# Patient Record
Sex: MALE | Race: WHITE | Employment: OTHER | URBAN - METROPOLITAN AREA
[De-identification: names, ages, dates, MRNs, and addresses within clinical notes are randomized per-mention and may not be internally consistent; named-entity substitution may affect disease eponyms.]

---

## 2017-01-06 ENCOUNTER — TELEPHONE (OUTPATIENT)
Dept: FAMILY MEDICINE | Facility: CLINIC | Age: 72
End: 2017-01-06

## 2017-01-06 DIAGNOSIS — J40 BRONCHITIS: Primary | ICD-10-CM

## 2017-01-06 NOTE — TELEPHONE ENCOUNTER
Cefuroxime      Last Written Prescription Date: Histicorical  Last Fill Quantity: ?,  # refills: ?   Last Office Visit with [facility] [place] or [facility] prescribing provider: 12/19/2016                                         Next 5 appointments (look out 90 days)     Jan 19, 2017  9:00 AM   Return Visit with Lillian Machado MD   [facility] ([facility])    [address] [address]   [phone]            Feb 02, 2017 10:40 AM   Office Visit with Marcelino Foster MD   [facility]    [address] [address]   [phone]

## 2017-01-12 ENCOUNTER — ANTICOAGULATION THERAPY VISIT (OUTPATIENT)
Dept: ANTICOAGULATION | Facility: CLINIC | Age: 72
End: 2017-01-12
Payer: MEDICARE

## 2017-01-12 ENCOUNTER — HOSPITAL ENCOUNTER (OUTPATIENT)
Dept: CARDIOLOGY | Facility: CLINIC | Age: 72
Discharge: HOME | End: 2017-01-12
Attending: INTERNAL MEDICINE | Admitting: INTERNAL MEDICINE
Payer: MEDICARE

## 2017-01-12 DIAGNOSIS — I25.10 CORONARY ATHEROSCLEROSIS: ICD-10-CM

## 2017-01-12 DIAGNOSIS — I48.91 ATRIAL FIBRILLATION (H): ICD-10-CM

## 2017-01-12 DIAGNOSIS — Z79.01 LONG TERM CURRENT USE OF ANTICOAGULANT THERAPY: Primary | ICD-10-CM

## 2017-01-12 DIAGNOSIS — I10 ESSENTIAL HYPERTENSION: ICD-10-CM

## 2017-01-12 LAB — INR POINT OF CARE: 3 (ref 0.86–1.14)

## 2017-01-12 PROCEDURE — 85610 PROTHROMBIN TIME: CPT | Mod: QW

## 2017-01-12 PROCEDURE — 40000264 ECHO COMPLETE WITH OPTISON

## 2017-01-12 PROCEDURE — 36416 COLLJ CAPILLARY BLOOD SPEC: CPT

## 2017-01-12 PROCEDURE — 99207 ZZC NO CHARGE NURSE ONLY: CPT

## 2017-01-12 PROCEDURE — 25500064 ZZH RX 255 OP 636: Performed by: INTERNAL MEDICINE

## 2017-01-12 PROCEDURE — 93306 TTE W/DOPPLER COMPLETE: CPT | Mod: 26 | Performed by: INTERNAL MEDICINE

## 2017-01-12 RX ADMIN — HUMAN ALBUMIN MICROSPHERES AND PERFLUTREN 2 ML: 10; .22 INJECTION, SOLUTION INTRAVENOUS at 10:40

## 2017-01-12 NOTE — PROGRESS NOTES
ANTICOAGULATION FOLLOW-UP CLINIC VISIT    Patient Name:  Stef Bhatt  Date:  1/12/2017  Contact Type:  Face to Face    SUBJECTIVE:     Patient Findings     Positives Diet Changes (had one extra drink this week than he usually does), Activity level change (has been down, except the past three days has been shoveling), No Problem Findings    Comments ECHO today and will see Dr. Machado in one week. Has appt with PCP 2/2.           OBJECTIVE    INR PROTIME   Date Value Ref Range Status   01/12/2017 3.0* 0.86 - 1.14 Final       ASSESSMENT / PLAN  INR assessment THER    Recheck INR In: 8 WEEKS 9 weeks   INR Location Clinic      Anticoagulation Summary as of 1/12/2017     INR goal 2.0-3.0   Selected INR 3.0 (1/12/2017)   Maintenance plan 2 mg (2 mg x 1) on Mon, Fri; 4 mg (2 mg x 2) all other days   Full instructions 2 mg on Mon, Fri; 4 mg all other days   Weekly total 24 mg   No change documented Blanca Nixon RN   Plan last modified Noelle Harrington RN (4/6/2015)   Next INR check 3/16/2017   Priority INR   Target end date Indefinite    Indications   Atrial fibrillation (H) [I48.91]  Long term current use of anticoagulant therapy [Z79.01]         Anticoagulation Episode Summary     INR check location     Preferred lab     Send INR reminders to [facility]    Comments * warfarin 2 mg tabs. Dr Shelley Lennox is pulmonologist.       Anticoagulation Care Providers     Provider Role Specialty Phone number    Marcelino Foster MD [facility] [phone]            See the Encounter Report to view Anticoagulation Flowsheet and Dosing Calendar (Go to Encounters tab in chart review, and find the Anticoagulation Therapy Visit)    Blanca Nixon, RN

## 2017-01-12 NOTE — TELEPHONE ENCOUNTER
Dr. Foster,    Patient taking differently.  Patient wants 3 refills  Please advise. Sandhya MATHEW RN    cefUROXime (CEFTIN) 500 MG tablet 20 tablet 3 12/2/2016 12/12/2016 No      Sig: Take 1 tablet (500 mg) by mouth 2 times daily for 10 days     Class: E-Prescribe     Notes to Pharmacy: Pt will call to order     Route: Oral     Order: [id number]

## 2017-01-12 NOTE — TELEPHONE ENCOUNTER
Routing refill request to provider for review/approval because:  Medication is reported/historical    Flower Arteaga RN

## 2017-01-12 NOTE — MR AVS SNAPSHOT
Stef Bhatt   1/12/2017 8:45 AM   Anticoagulation Therapy Visit    Description:  71 year old male   Provider:  [facility]   Department:  [facility]           INR as of 1/12/2017     Selected INR 3.0 (1/12/2017)      Anticoagulation Summary as of 1/12/2017     INR goal 2.0-3.0   Selected INR 3.0 (1/12/2017)   Full instructions 2 mg on Mon, Fri; 4 mg all other days   Next INR check 3/16/2017    Indications   Atrial fibrillation (H) [I48.91]  Long term current use of anticoagulant therapy [Z79.01]         Description     No change, recheck INR in 9 weeks.      Your next Anticoagulation Clinic appointment(s)     Jan 12, 2017  8:45 AM   Anticoagulation Visit with [facility]   [facility]    [address] [address] [phone]              Contact Numbers     Please call [phone] to cancel and/or reschedule your appointment.  Please call [phone] with any problems or questions regarding your therapy          January 2017 Details    Sun Mon Tue Wed Thu Fri Sat     1               2               3               4               5               6               7                 8               9               10               11               12      4 mg   See details      13      2 mg         14      4 mg           15      4 mg         16      2 mg         17      4 mg         18      4 mg         19      4 mg         20      2 mg         21      4 mg           22      4 mg         23      2 mg         24      4 mg         25      4 mg         26      4 mg         27      2 mg         28      4 mg           29      4 mg         30      2 mg         31      4 mg              Date Details   01/12 This INR check               How to take your warfarin dose     To take:  2 mg Take 1 of the 2 mg tablets.    To take:  4 mg Take 2 of the 2 mg tablets.           February 2017 Details    Sun Mon Tue Wed Thu Fri Sat        1      4 mg         2       4 mg         3      2 mg         4      4 mg           5      4 mg         6      2 mg         7      4 mg         8      4 mg         9      4 mg         10      2 mg         11      4 mg           12      4 mg         13      2 mg         14      4 mg         15      4 mg         16      4 mg         17      2 mg         18      4 mg           19      4 mg         20      2 mg         21      4 mg         22      4 mg         23      4 mg         24      2 mg         25      4 mg           26      4 mg         27      2 mg         28      4 mg              Date Details   No additional details            How to take your warfarin dose     To take:  2 mg Take 1 of the 2 mg tablets.    To take:  4 mg Take 2 of the 2 mg tablets.           March 2017 Details    Sun Mon Tue Wed Thu Fri Sat        1      4 mg         2      4 mg         3      2 mg         4      4 mg           5      4 mg         6      2 mg         7      4 mg         8      4 mg         9      4 mg         10      2 mg         11      4 mg           12      4 mg         13      2 mg         14      4 mg         15      4 mg         16            17               18                 19               20               21               22               23               24               25                 26               27               28               29               30               31                 Date Details   No additional details    Date of next INR:  3/16/2017         How to take your warfarin dose     To take:  2 mg Take 1 of the 2 mg tablets.    To take:  4 mg Take 2 of the 2 mg tablets.

## 2017-01-12 NOTE — TELEPHONE ENCOUNTER
Dr. Foster,    The  Patient calls back and he says he got the antibiotic with refills.  He so sorry about the confusion. Sandhya MATHEW RN

## 2017-01-19 ENCOUNTER — OFFICE VISIT (OUTPATIENT)
Dept: CARDIOLOGY | Facility: CLINIC | Age: 72
End: 2017-01-19
Attending: INTERNAL MEDICINE
Payer: MEDICARE

## 2017-01-19 VITALS
SYSTOLIC BLOOD PRESSURE: 103 MMHG | DIASTOLIC BLOOD PRESSURE: 69 MMHG | HEART RATE: 63 BPM | OXYGEN SATURATION: 93 % | WEIGHT: 255.6 LBS | BODY MASS INDEX: 36.67 KG/M2

## 2017-01-19 DIAGNOSIS — Z95.1 POSTSURGICAL AORTOCORONARY BYPASS STATUS: ICD-10-CM

## 2017-01-19 PROCEDURE — 99213 OFFICE O/P EST LOW 20 MIN: CPT | Performed by: INTERNAL MEDICINE

## 2017-01-19 RX ORDER — CEFUROXIME AXETIL 500 MG/1
TABLET ORAL
COMMUNITY
Start: 2017-01-06 | End: 2017-03-30 | Stop reason: DRUGHIGH

## 2017-01-19 NOTE — PROGRESS NOTES
CURRENT MEDICATIONS:  Current Outpatient Prescriptions   Medication Sig Dispense Refill     cefUROXime (CEFTIN) 500 MG tablet        pregabalin (LYRICA) 50 MG capsule Take 2 in the am, one at noon and one in the pm. 120 capsule 5     nitroglycerin (NITROSTAT) 0.4 MG SL tablet Place 1 tablet (0.4 mg) under the tongue every 5 minutes as needed for chest pain 25 tablet 11     ketoconazole (NIZORAL) 2 % shampoo Apply daily then rinse. Pt will call to order 120 mL 11     ipratropium - albuterol 0.5 mg/2.5 mg/3 mL (DUONEB) 0.5-2.5 (3) MG/3ML nebulization Take 1 vial by nebulization every 6 hours as needed for shortness of breath / dyspnea or wheezing       predniSONE (DELTASONE) 10 MG tablet Take 10 mg by mouth daily       umeclidinium bromide (INCRUSE ELLIPTA) 62.5 MCG/INH inhalation capsule Inhale 1 capsule (62.5 mcg) into the lungs daily       hydrocortisone (ANUSOL-HC) 2.5 % rectal cream Place rectally 2 times daily 30 g 3     diphenhydrAMINE-acetaminophen (TYLENOL PM EXTRA STRENGTH)  MG tablet Take 1 tablet by mouth nightly as needed for sleep       acetaminophen (TYLENOL) 500 MG tablet Take 500 mg by mouth every 6 hours as needed for mild pain       albuterol (VENTOLIN HFA) 108 (90 BASE) MCG/ACT inhaler Inhale 2 puffs into the lungs every 4 hours as needed Pt will call to order 1 Inhaler 11     PSYLLIUM HUSK        guaiFENesin (MUCINEX) 600 MG 12 hr tablet Take 400-600 mg by mouth 2 times daily        fexofenadine (ALLEGRA) 180 MG tablet Take 180 mg by mouth every evening  30 tablet 1     aspirin 81 MG tablet Take 81 mg by mouth every evening. 1 tablet 3     losartan (COZAAR) 50 MG tablet Take 1 tablet (50 mg) by mouth daily 90 tablet 3     metoprolol (TOPROL-XL) 50 MG 24 hr tablet 100 mg in am, 50 mg in pm 270 tablet 3     simvastatin (ZOCOR) 20 MG tablet Take 1 tablet (20 mg) by mouth daily 90 tablet 3     glipiZIDE (GLIPIZIDE XL) 10 MG 24 hr tablet Take 2 in the am 180 tablet 3     metFORMIN (GLUCOPHAGE)  500 MG tablet Take 1 tablet (500 mg) by mouth daily (with dinner) 90 tablet 3     warfarin (COUMADIN) 2 MG tablet As directed by Anticoagulation Clinic (current dose 2mg MF, 4mg rest of the week) 150 tablet 3     blood glucose (ACCU-CHEK COMPACT DRUM) test strip Use to test blood sugar three times daily or as directed. 270 strip 1     ascorbic acid (VITAMIN C) 1000 MG TABS Take 1,000 mg by mouth daily In winter only       glucose blood VI test strips strip Use as directed to check sugars twice a day 3 Month 3     ACCU-CHEK SOFTCLIX LANCETS MISC use as directed to test blood sugars up to once daily; pt will call to order 100 Each 11       ALLERGIES     Allergies   Allergen Reactions     Sulfa Drugs Rash     Rash on chest     Versed Other (See Comments)     agitation     Captopril Fatigue     dizziness     Cimetidine Other (See Comments)     Tagamet - reaction unknown     Lisinopril Other (See Comments) and Fatigue     dizziness       PAST MEDICAL HISTORY:  Past Medical History   Diagnosis Date     Type II or unspecified type diabetes mellitus without mention of complication, not stated as uncontrolled      Tobacco use disorder      quit in January 2005     Pure hypercholesterolemia      Obesity, unspecified      Carpal tunnel syndrome      Trigger finger (acquired)      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     COPD (chronic obstructive pulmonary disease) (H)        PAST SURGICAL HISTORY:  Past Surgical History   Procedure Laterality Date     Surgical history of -        chay     Surgical history of -        choledochocystotomy     Surgical history of -        lumbar     Surgical history of -   8/94     colonoscopy     Coronary artery bypass  dec 2006     Flexible sigmoidoscopy  7/15/05     Colonoscopy  1/27/2011     COMBINED COLONOSCOPY, REMOVE TUMOR/POLYP/LESION BY SNARE performed by MARIUSZ ELIZALDE at [facility]     Phacoemulsification with standard intraocular lens implant  1/19/2012      Procedure:PHACOEMULSIFICATION WITH STANDARD INTRAOCULAR LENS IMPLANT; Right Kelman Phacoemulsification with introcular lens implant ; Surgeon:ROBY DORADO; Location:[facility]     Phacoemulsification with standard intraocular lens implant  2/2/2012     Procedure:PHACOEMULSIFICATION WITH STANDARD INTRAOCULAR LENS IMPLANT; Left Kelman Phacoemulsification with introcular lens implant ; Surgeon:ROBY DORADO; Location:[facility]       Social History:  Social History   Substance Use Topics     Smoking status: Former Smoker -- 1.00 packs/day     Types: Cigarettes, Cigars     Quit date: 01/01/2006     Smokeless tobacco: Never Used      Comment: cigar once in a blue moon-quit again about 6 weeks ago     Alcohol Use: Yes      Comment: occasional       FAMILY HISTORY:  Family History   Problem Relation Age of Onset     DIABETES Brother      Breast Cancer Sister      CANCER Sister      lymph nodes     HEART DISEASE Father        Review of Systems:  Skin:  Positive for bruising     Eyes:  Negative      ENT:  Positive for hoarseness    Respiratory:  Positive for dyspnea on exertion;shortness of breath     Cardiovascular:    Positive for;chest pain;lightheadedness    Gastroenterology: Negative      Genitourinary:  Negative      Musculoskeletal:  Positive for back pain;joint pain;arthritis;joint swelling;joint stiffness    Neurologic:  Positive for headaches;numbness or tingling of feet neuropathy  Psychiatric:  Negative      Heme/Lymph/Imm:  Negative      Endocrine:  Positive for diabetes

## 2017-01-19 NOTE — PROGRESS NOTES
"HISTORY OF PRESENT ILLNESS:  Mr. Stef Bhatt is 71 years old and is followed for history of coronary artery disease and for atrial fibrillation.      Ten years ago, Mr. Bhatt was experiencing symptoms of coronary artery disease.  He had multivessel coronary disease which resulted in a recommendation for bypass graft surgery.  Dr. Alex Joyner placed a LIMA graft to the LAD, a vein graft to the diagonal branch and a vein graft to the PDA, but the circumflex coronary artery had only mild disease and did not require grafting.  Mr. Bhatt denies any chest, neck, arm or back discomfort  other than the chest discomfort which he has always had which is a several second \"shooting\" discomfort across his lower chest.  It occurs about twice a year.      His last stress nuclear study was in 2013 and there was evidence of only a small to moderate and predominantly fixed inferior/inferolateral defect with a \"very small\" nontransmural infarct and minimal keny-infarction ischemia.  His blood pressure remains as well controlled as usual at 103/69 mmHg.  His lipids are under excellent control with his last LDL being 54 mg/dL.  He continues on low dose aspirin, statin therapy and an ARB agent and metoprolol.      With regard to his atrial fibrillation, it is chronic.  It is well rate controlled and he is on AV gisell blockade with metoprolol.  He is also on warfarin anticoagulation given his elevated CHADS2-VASc score.  He remains on aspirin and warfarin given the need for both given the coronary disease.  He has not had any bleeding complications.      He also has chronic obstructive pulmonary disease.  He notes that when the weather is more humid, he has greater difficulty breathing.  He has previously followed with Dr. Shelley Lennox and will be switching to Dr. Quintana now that Dr. Lennox has left the [facility].  He has a prescription waiting for him when he needs it, consisting of prednisone and cefuroxime, which " he uses about once or twice yearly.      PHYSICAL EXAMINATION:   GENERAL:  This is a man in no apparent distress.   VITAL SIGNS:  The blood pressure is 103/69 mmHg, heart rate 63 beats per minute and irregular, and respiratory rate 16-20 per minute.   CHEST:  Actually free of crackles or wheezes and lung sounds were hyperechoic.  They were unlabored.   CARDIAC:  On cardiac auscultation, there is a soft and irregular S1 and S2 without extra sounds or murmur.  The rhythm was, as noted, irregularly irregular.      ASSESSMENT:  Mr. Aldrich has a history of coronary artery disease and prior revascularization.  He underwent echocardiography this time which demonstrated a small inferobasal wall motion abnormality with completely normal left ventricular systolic performance and an absence of clinically significant valvular disease.  He is on appropriate medical therapy for his coronary disease as described above.  I have recommended a return at 1 year unless he has earlier symptoms.      With regard to the atrial fibrillation, his rate is controlled.  He is on appropriate warfarin anticoagulation.  He has no complications.  He will return as noted.      cc:   [facility]   [address] [place] [address]         SARWAT SANTIAGO MD, [facility]             D: 2017 09:33   T: 2017 12:52   MT: ANTONIO      Name:     JAMARCUS ALDRICH   MRN:      [id number]        Account:      [id number]   :      1945           Service Date: 2017      Document: [id number]

## 2017-01-19 NOTE — Clinical Note
"1/19/2017    Marcelino Foster MD  [facility]   [address] [facility] [address]    RE: Stef Bhatt       Dear Colleague,    I had the pleasure of seeing Stef Bhatt in the [facility].    Mr. Stef Bhatt is 71 years old and is followed for history of coronary artery disease and for atrial fibrillation.      Ten years ago, Mr. Bhatt was experiencing symptoms of coronary artery disease.  He had multivessel coronary disease which resulted in a recommendation for bypass graft surgery.  Dr. Alex Joyner placed a LIMA graft to the LAD, a vein graft to the diagonal branch and a vein graft to the PDA, but the circumflex coronary artery had only mild disease and did not require grafting.  Mr. Bhatt denies any chest, neck, arm or back discomfort  other than the chest discomfort which he has always had which is a several second \"shooting\" discomfort across his lower chest.  It occurs about twice a year.      His last stress nuclear study was in 2013 and there was evidence of only a small to moderate and predominantly fixed inferior/inferolateral defect with a \"very small\" nontransmural infarct and minimal keny-infarction ischemia.  His blood pressure remains as well controlled as usual at 103/69 mmHg.  His lipids are under excellent control with his last LDL being 54 mg/dL.  He continues on low dose aspirin, statin therapy and an ARB agent and metoprolol.      With regard to his atrial fibrillation, it is chronic.  It is well rate controlled and he is on AV gisell blockade with metoprolol.  He is also on warfarin anticoagulation given his elevated CHADS2-VASc score.  He remains on aspirin and warfarin given the need for both given the coronary disease.  He has not had any bleeding complications.      He also has chronic obstructive pulmonary disease.  He notes that when the weather is more humid, he has greater difficulty breathing.  He has previously followed " with Dr. Shelley Lennox and will be switching to Dr. Quintana now that Dr. Lennox has left the [facility].  He has a prescription waiting for him when he needs it, consisting of prednisone and cefuroxime, which he uses about once or twice yearly.      PHYSICAL EXAMINATION:   GENERAL:  This is a man in no apparent distress.   VITAL SIGNS:  The blood pressure is 103/69 mmHg, heart rate 63 beats per minute and irregular, and respiratory rate 16-20 per minute.   CHEST:  Actually free of crackles or wheezes and lung sounds were hyperechoic.  They were unlabored.   CARDIAC:  On cardiac auscultation, there is a soft and irregular S1 and S2 without extra sounds or murmur.  The rhythm was, as noted, irregularly irregular.      Outpatient Encounter Prescriptions as of 1/19/2017   Medication Sig Dispense Refill     cefUROXime (CEFTIN) 500 MG tablet        pregabalin (LYRICA) 50 MG capsule Take 2 in the am, one at noon and one in the pm. 120 capsule 5     nitroglycerin (NITROSTAT) 0.4 MG SL tablet Place 1 tablet (0.4 mg) under the tongue every 5 minutes as needed for chest pain 25 tablet 11     ketoconazole (NIZORAL) 2 % shampoo Apply daily then rinse. Pt will call to order 120 mL 11     ipratropium - albuterol 0.5 mg/2.5 mg/3 mL (DUONEB) 0.5-2.5 (3) MG/3ML nebulization Take 1 vial by nebulization every 6 hours as needed for shortness of breath / dyspnea or wheezing       predniSONE (DELTASONE) 10 MG tablet Take 10 mg by mouth daily       umeclidinium bromide (INCRUSE ELLIPTA) 62.5 MCG/INH inhalation capsule Inhale 1 capsule (62.5 mcg) into the lungs daily       hydrocortisone (ANUSOL-HC) 2.5 % rectal cream Place rectally 2 times daily 30 g 3     diphenhydrAMINE-acetaminophen (TYLENOL PM EXTRA STRENGTH)  MG tablet Take 1 tablet by mouth nightly as needed for sleep       acetaminophen (TYLENOL) 500 MG tablet Take 500 mg by mouth every 6 hours as needed for mild pain       albuterol (VENTOLIN HFA) 108 (90 BASE)  MCG/ACT inhaler Inhale 2 puffs into the lungs every 4 hours as needed Pt will call to order 1 Inhaler 11     PSYLLIUM HUSK        guaiFENesin (MUCINEX) 600 MG 12 hr tablet Take 400-600 mg by mouth 2 times daily        fexofenadine (ALLEGRA) 180 MG tablet Take 180 mg by mouth every evening  30 tablet 1     aspirin 81 MG tablet Take 81 mg by mouth every evening. 1 tablet 3     [DISCONTINUED] Cefuroxime Axetil 125 MG/5ML SUSR Take 10 ml twice daily for 10 days. 200 mL 0     losartan (COZAAR) 50 MG tablet Take 1 tablet (50 mg) by mouth daily 90 tablet 3     metoprolol (TOPROL-XL) 50 MG 24 hr tablet 100 mg in am, 50 mg in pm 270 tablet 3     simvastatin (ZOCOR) 20 MG tablet Take 1 tablet (20 mg) by mouth daily 90 tablet 3     glipiZIDE (GLIPIZIDE XL) 10 MG 24 hr tablet Take 2 in the am 180 tablet 3     metFORMIN (GLUCOPHAGE) 500 MG tablet Take 1 tablet (500 mg) by mouth daily (with dinner) 90 tablet 3     warfarin (COUMADIN) 2 MG tablet As directed by Anticoagulation Clinic (current dose 2mg MF, 4mg rest of the week) 150 tablet 3     blood glucose (ACCU-CHEK COMPACT DRUM) test strip Use to test blood sugar three times daily or as directed. 270 strip 1     ascorbic acid (VITAMIN C) 1000 MG TABS Take 1,000 mg by mouth daily In winter only       glucose blood VI test strips strip Use as directed to check sugars twice a day 3 Month 3     ACCU-CHEK SOFTCLIX LANCETS MISC use as directed to test blood sugars up to once daily; pt will call to order 100 Each 11     No facility-administered encounter medications on file as of 1/19/2017.     ASSESSMENT:  Mr. Bhatt has a history of coronary artery disease and prior revascularization.  He underwent echocardiography this time which demonstrated a small inferobasal wall motion abnormality with completely normal left ventricular systolic performance and an absence of clinically significant valvular disease.  He is on appropriate medical therapy for his coronary disease as described  above.  I have recommended a return at 1 year unless he has earlier symptoms.      With regard to the atrial fibrillation, his rate is controlled.  He is on appropriate warfarin anticoagulation.  He has no complications.  He will return as noted.     Again, thank you for allowing me to participate in the care of your patient.      Sincerely,    Lillian Machado MD     [facility]    cc:   [facility]   [address] [place], [place] [id number]

## 2017-01-19 NOTE — PROGRESS NOTES
HPI and Plan:   See dictation    No orders of the defined types were placed in this encounter.       Orders Placed This Encounter   Medications     cefUROXime (CEFTIN) 500 MG tablet     Sig:        Medications Discontinued During This Encounter   Medication Reason     Cefuroxime Axetil 125 MG/5ML SUSR          Encounter Diagnosis   Name Primary?     Postsurgical aortocoronary bypass status        CURRENT MEDICATIONS:  Current Outpatient Prescriptions   Medication Sig Dispense Refill     cefUROXime (CEFTIN) 500 MG tablet        pregabalin (LYRICA) 50 MG capsule Take 2 in the am, one at noon and one in the pm. 120 capsule 5     nitroglycerin (NITROSTAT) 0.4 MG SL tablet Place 1 tablet (0.4 mg) under the tongue every 5 minutes as needed for chest pain 25 tablet 11     ketoconazole (NIZORAL) 2 % shampoo Apply daily then rinse. Pt will call to order 120 mL 11     ipratropium - albuterol 0.5 mg/2.5 mg/3 mL (DUONEB) 0.5-2.5 (3) MG/3ML nebulization Take 1 vial by nebulization every 6 hours as needed for shortness of breath / dyspnea or wheezing       predniSONE (DELTASONE) 10 MG tablet Take 10 mg by mouth daily       umeclidinium bromide (INCRUSE ELLIPTA) 62.5 MCG/INH inhalation capsule Inhale 1 capsule (62.5 mcg) into the lungs daily       hydrocortisone (ANUSOL-HC) 2.5 % rectal cream Place rectally 2 times daily 30 g 3     diphenhydrAMINE-acetaminophen (TYLENOL PM EXTRA STRENGTH)  MG tablet Take 1 tablet by mouth nightly as needed for sleep       acetaminophen (TYLENOL) 500 MG tablet Take 500 mg by mouth every 6 hours as needed for mild pain       albuterol (VENTOLIN HFA) 108 (90 BASE) MCG/ACT inhaler Inhale 2 puffs into the lungs every 4 hours as needed Pt will call to order 1 Inhaler 11     PSYLLIUM HUSK        guaiFENesin (MUCINEX) 600 MG 12 hr tablet Take 400-600 mg by mouth 2 times daily        fexofenadine (ALLEGRA) 180 MG tablet Take 180 mg by mouth every evening  30 tablet 1     aspirin 81 MG tablet Take 81  mg by mouth every evening. 1 tablet 3     losartan (COZAAR) 50 MG tablet Take 1 tablet (50 mg) by mouth daily 90 tablet 3     metoprolol (TOPROL-XL) 50 MG 24 hr tablet 100 mg in am, 50 mg in pm 270 tablet 3     simvastatin (ZOCOR) 20 MG tablet Take 1 tablet (20 mg) by mouth daily 90 tablet 3     glipiZIDE (GLIPIZIDE XL) 10 MG 24 hr tablet Take 2 in the am 180 tablet 3     metFORMIN (GLUCOPHAGE) 500 MG tablet Take 1 tablet (500 mg) by mouth daily (with dinner) 90 tablet 3     warfarin (COUMADIN) 2 MG tablet As directed by Anticoagulation Clinic (current dose 2mg MF, 4mg rest of the week) 150 tablet 3     blood glucose (ACCU-CHEK COMPACT DRUM) test strip Use to test blood sugar three times daily or as directed. 270 strip 1     ascorbic acid (VITAMIN C) 1000 MG TABS Take 1,000 mg by mouth daily In winter only       glucose blood VI test strips strip Use as directed to check sugars twice a day 3 Month 3     ACCU-CHEK SOFTCLIX LANCETS MISC use as directed to test blood sugars up to once daily; pt will call to order 100 Each 11       ALLERGIES     Allergies   Allergen Reactions     Sulfa Drugs Rash     Rash on chest     Versed Other (See Comments)     agitation     Captopril Fatigue     dizziness     Cimetidine Other (See Comments)     Tagamet - reaction unknown     Lisinopril Other (See Comments) and Fatigue     dizziness       PAST MEDICAL HISTORY:  Past Medical History   Diagnosis Date     Type II or unspecified type diabetes mellitus without mention of complication, not stated as uncontrolled      Tobacco use disorder      quit in January 2005     Pure hypercholesterolemia      Obesity, unspecified      Carpal tunnel syndrome      Trigger finger (acquired)      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     COPD (chronic obstructive pulmonary disease) (H)        PAST SURGICAL HISTORY:  Past Surgical History   Procedure Laterality Date     Surgical history of -        chay     Surgical history of  -        choledochocystotomy     Surgical history of -        lumbar     Surgical history of -   8/94     colonoscopy     Coronary artery bypass  dec 2006     Flexible sigmoidoscopy  7/15/05     Colonoscopy  1/27/2011     COMBINED COLONOSCOPY, REMOVE TUMOR/POLYP/LESION BY SNARE performed by MARIUSZ ELIZALDE at [facility]     Phacoemulsification with standard intraocular lens implant  1/19/2012     Procedure:PHACOEMULSIFICATION WITH STANDARD INTRAOCULAR LENS IMPLANT; Right Kelman Phacoemulsification with introcular lens implant ; Surgeon:ROBY DORADO; Location:[facility]     Phacoemulsification with standard intraocular lens implant  2/2/2012     Procedure:PHACOEMULSIFICATION WITH STANDARD INTRAOCULAR LENS IMPLANT; Left Kelman Phacoemulsification with introcular lens implant ; Surgeon:ROBY DORADO; Location:[facility]       FAMILY HISTORY:  Family History   Problem Relation Age of Onset     DIABETES Brother      Breast Cancer Sister      CANCER Sister      lymph nodes     HEART DISEASE Father        SOCIAL HISTORY:  Social History     Social History     Marital Status:      Spouse Name: N/A     Number of Children: N/A     Years of Education: N/A     Social History Main Topics     Smoking status: Former Smoker -- 1.00 packs/day     Types: Cigarettes, Cigars     Quit date: 01/01/2006     Smokeless tobacco: Never Used      Comment: cigar once in a blue moon-quit again about 6 weeks ago     Alcohol Use: Yes      Comment: occasional     Drug Use: No     Sexual Activity: Not Asked     Other Topics Concern     Parent/Sibling W/ Cabg, Mi Or Angioplasty Before 65f 55m? No     Social History Narrative       Review of Systems:  Skin:  Positive for bruising     Eyes:  Negative      ENT:  Positive for hoarseness    Respiratory:  Positive for dyspnea on exertion;shortness of breath     Cardiovascular:    Positive for;chest pain;lightheadedness    Gastroenterology: Negative      Genitourinary:  Negative       Musculoskeletal:  Positive for back pain;joint pain;arthritis;joint swelling;joint stiffness    Neurologic:  Positive for headaches;numbness or tingling of feet neuropathy  Psychiatric:  Negative      Heme/Lymph/Imm:  Negative      Endocrine:  Positive for diabetes      Physical Exam:  Vitals: /69 mmHg  Pulse 63  Wt 115.939 kg (255 lb 9.6 oz)  SpO2 93%    Constitutional:  cooperative, alert and oriented, well developed, well nourished, in no acute distress        Skin:  warm and dry to the touch        Head:  normocephalic        Eyes:  sclera white        ENT:           Neck:           Chest:  normal symmetry     no wheezes, breathsounds clear throughout; hyperechoic    Cardiac: regular rhythm;normal S1 and S2;no S3 or S4     no presence of murmur            Abdomen:           Vascular:                                          Extremities and Back:  no deformities, clubbing, cyanosis, erythema observed;no edema              Neurological:  affect appropriate, oriented to time, person and place   uses a cane/ mildly dependent gait with left foot drop          CC  Lillian Machado MD   [facility]  [address]  [place] [id number]

## 2017-01-26 ENCOUNTER — OFFICE VISIT (OUTPATIENT)
Dept: PODIATRY | Facility: CLINIC | Age: 72
End: 2017-01-26
Payer: MEDICARE

## 2017-01-26 ENCOUNTER — HOSPITAL ENCOUNTER (OUTPATIENT)
Dept: ULTRASOUND IMAGING | Facility: CLINIC | Age: 72
Discharge: HOME | End: 2017-01-26
Attending: PODIATRIST | Admitting: PODIATRIST
Payer: MEDICARE

## 2017-01-26 VITALS — HEIGHT: 70 IN | WEIGHT: 255 LBS | BODY MASS INDEX: 36.51 KG/M2

## 2017-01-26 DIAGNOSIS — E11.43 TYPE II DIABETES MELLITUS WITH PERIPHERAL AUTONOMIC NEUROPATHY (H): ICD-10-CM

## 2017-01-26 DIAGNOSIS — E11.40 TYPE 2 DIABETES MELLITUS WITH DIABETIC NEUROPATHY (H): ICD-10-CM

## 2017-01-26 DIAGNOSIS — L84 CALLUS OF FOOT: ICD-10-CM

## 2017-01-26 DIAGNOSIS — M79.672 LEFT FOOT PAIN: Primary | ICD-10-CM

## 2017-01-26 DIAGNOSIS — R23.0 TOE CYANOSIS: ICD-10-CM

## 2017-01-26 DIAGNOSIS — R09.89 DIMINISHED PULSES IN LOWER EXTREMITY: ICD-10-CM

## 2017-01-26 LAB — HBA1C MFR BLD: 8.2 % (ref 4.3–6)

## 2017-01-26 PROCEDURE — 11055 PARING/CUTG B9 HYPRKER LES 1: CPT | Performed by: PODIATRIST

## 2017-01-26 PROCEDURE — 83036 HEMOGLOBIN GLYCOSYLATED A1C: CPT | Performed by: FAMILY MEDICINE

## 2017-01-26 PROCEDURE — 36415 COLL VENOUS BLD VENIPUNCTURE: CPT | Performed by: FAMILY MEDICINE

## 2017-01-26 PROCEDURE — 99203 OFFICE O/P NEW LOW 30 MIN: CPT | Mod: 25 | Performed by: PODIATRIST

## 2017-01-26 PROCEDURE — 93922 UPR/L XTREMITY ART 2 LEVELS: CPT

## 2017-01-26 NOTE — PROGRESS NOTES
PATIENT HISTORY:  Stef Bhatt is a 71 year old male who presents to clinic for a diabetic foot evaluation.  The patient relates pain with ambulation in shoe gear.  The patient relates that the nails are difficult to trim at home.  The patient relates blood sugars are within normal limits.  The patient denies any redness, swelling or open sores on both feet.   The patient does relate having a wart on the bottom of the left foot.  The patient used over the counter wart removal with a resulting blister noted.      REVIEW OF SYSTEMS:  Constitutional, HEENT, cardiovascular, pulmonary, GI, , musculoskeletal, neuro, skin, endocrine and psych systems are negative, except as otherwise noted.     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     COPD (chronic obstructive pulmonary disease) (H)         PAST SURGICAL HISTORY:   Past Surgical History   Procedure Laterality Date     Surgical history of -        chay     Surgical history of -        choledochocystotomy     Surgical history of -        lumbar     Surgical history of -   8/94     colonoscopy     Coronary artery bypass  dec 2006     Flexible sigmoidoscopy  7/15/05     Colonoscopy  1/27/2011     COMBINED COLONOSCOPY, REMOVE TUMOR/POLYP/LESION BY SNARE performed by MARIUSZ ELZIALDE at [facility]     Phacoemulsification with standard intraocular lens implant  1/19/2012     Procedure:PHACOEMULSIFICATION WITH STANDARD INTRAOCULAR LENS IMPLANT; Right Kelman Phacoemulsification with introcular lens implant ; Surgeon:ROBY DORADO; Location:[facility]     Phacoemulsification with standard intraocular lens implant   2/2/2012     Procedure:PHACOEMULSIFICATION WITH STANDARD INTRAOCULAR LENS IMPLANT; Left Kelman Phacoemulsification with introcular lens implant ; Surgeon:ROBY DORADO; Location:[facility]        MEDICATIONS:   Current outpatient prescriptions:      cefUROXime (CEFTIN) 500 MG tablet, , Disp: , Rfl:      pregabalin (LYRICA) 50 MG capsule, Take 2 in the am, one at noon and one in the pm., Disp: 120 capsule, Rfl: 5     losartan (COZAAR) 50 MG tablet, Take 1 tablet (50 mg) by mouth daily, Disp: 90 tablet, Rfl: 3     metoprolol (TOPROL-XL) 50 MG 24 hr tablet, 100 mg in am, 50 mg in pm, Disp: 270 tablet, Rfl: 3     simvastatin (ZOCOR) 20 MG tablet, Take 1 tablet (20 mg) by mouth daily, Disp: 90 tablet, Rfl: 3     glipiZIDE (GLIPIZIDE XL) 10 MG 24 hr tablet, Take 2 in the am, Disp: 180 tablet, Rfl: 3     metFORMIN (GLUCOPHAGE) 500 MG tablet, Take 1 tablet (500 mg) by mouth daily (with dinner), Disp: 90 tablet, Rfl: 3     nitroglycerin (NITROSTAT) 0.4 MG SL tablet, Place 1 tablet (0.4 mg) under the tongue every 5 minutes as needed for chest pain, Disp: 25 tablet, Rfl: 11     ketoconazole (NIZORAL) 2 % shampoo, Apply daily then rinse. Pt will call to order, Disp: 120 mL, Rfl: 11     warfarin (COUMADIN) 2 MG tablet, As directed by Anticoagulation Clinic (current dose 2mg MF, 4mg rest of the week), Disp: 150 tablet, Rfl: 3     ipratropium - albuterol 0.5 mg/2.5 mg/3 mL (DUONEB) 0.5-2.5 (3) MG/3ML nebulization, Take 1 vial by nebulization every 6 hours as needed for shortness of breath / dyspnea or wheezing, Disp: , Rfl:      predniSONE (DELTASONE) 10 MG tablet, Take 10 mg by mouth daily, Disp: , Rfl:      umeclidinium bromide (INCRUSE ELLIPTA) 62.5 MCG/INH inhalation capsule, Inhale 1 capsule (62.5 mcg) into the lungs daily, Disp: , Rfl:      hydrocortisone (ANUSOL-HC) 2.5 % rectal cream, Place rectally 2 times daily, Disp: 30 g, Rfl: 3     diphenhydrAMINE-acetaminophen (TYLENOL PM EXTRA STRENGTH)  MG tablet, Take 1  tablet by mouth nightly as needed for sleep, Disp: , Rfl:      acetaminophen (TYLENOL) 500 MG tablet, Take 500 mg by mouth every 6 hours as needed for mild pain, Disp: , Rfl:      albuterol (VENTOLIN HFA) 108 (90 BASE) MCG/ACT inhaler, Inhale 2 puffs into the lungs every 4 hours as needed Pt will call to order, Disp: 1 Inhaler, Rfl: 11     blood glucose (ACCU-CHEK COMPACT DRUM) test strip, Use to test blood sugar three times daily or as directed., Disp: 270 strip, Rfl: 1     ascorbic acid (VITAMIN C) 1000 MG TABS, Take 1,000 mg by mouth daily In winter only, Disp: , Rfl:      PSYLLIUM HUSK, , Disp: , Rfl:      guaiFENesin (MUCINEX) 600 MG 12 hr tablet, Take 400-600 mg by mouth 2 times daily , Disp: , Rfl:      glucose blood VI test strips strip, Use as directed to check sugars twice a day, Disp: 3 Month, Rfl: 3     fexofenadine (ALLEGRA) 180 MG tablet, Take 180 mg by mouth every evening , Disp: 30 tablet, Rfl: 1     aspirin 81 MG tablet, Take 81 mg by mouth every evening., Disp: 1 tablet, Rfl: 3     ACCU-CHEK SOFTCLIX LANCETS MISC, use as directed to test blood sugars up to once daily; pt will call to order, Disp: 100 Each, Rfl: 11     ALLERGIES:    Allergies   Allergen Reactions     Sulfa Drugs Rash     Rash on chest     Versed Other (See Comments)     agitation     Captopril Fatigue     dizziness     Cimetidine Other (See Comments)     Tagamet - reaction unknown     Lisinopril Other (See Comments) and Fatigue     dizziness        SOCIAL HISTORY:   Social History     Social History     Marital Status:      Spouse Name: N/A     Number of Children: N/A     Years of Education: N/A     Occupational History     Not on file.     Social History Main Topics     Smoking status: Former Smoker -- 1.00 packs/day     Types: Cigarettes, Cigars     Quit date: 01/01/2006     Smokeless tobacco: Never Used      Comment: cigar once in a blue moon-quit again about 6 weeks ago     Alcohol Use: Yes      Comment: occasional      "Drug Use: No     Sexual Activity: Not on file     Other Topics Concern     Parent/Sibling W/ Cabg, Mi Or Angioplasty Before 65f 55m? No     Social History Narrative        FAMILY HISTORY:   Family History   Problem Relation Age of Onset     DIABETES Brother      Breast Cancer Sister      CANCER Sister      lymph nodes     HEART DISEASE Father         EXAM:Vitals: Ht 1.778 m (5' 10\")  Wt 115.667 kg (255 lb)  BMI 36.59 kg/m2  BMI= Body mass index is 36.59 kg/(m^2).    Weight management plan: Patient was referred to their PCP to discuss a diet and exercise plan.    General appearance: Patient is alert and fully cooperative with history & exam.  No sign of distress is noted during the visit.     Psychiatric: Affect is pleasant & appropriate.  Patient appears motivated to improve health.     Respiratory: Breathing is regular & unlabored while sitting.     HEENT: Hearing is intact to spoken word.  Speech is clear.  No gross evidence of visual impairment that would impact ambulation.     Dermatologic: Skin is intact to both lower extremities without significant lesions, rash or abrasion.  No paronychia or evidence of soft tissue infection is noted.  The nails are hypertrophic and discolored.  The digits appear cyanotic left greater than right     Vascular: DP & PT pulses non palpable bilaterally.  No significant edema or varicosities noted.  CFT and skin temperature is normal to both lower extremities.  There are no varicosities, no edema and noted trophic changes noted.      Neurologic: Lower extremity sensation is intact to light touch.  No evidence of weakness or contracture in the lower extremities.  No evidence of neuropathy.  Sensorium appears diminished to Semms David Monofilament test bilaterally.       Musculoskeletal: Patient is ambulatory without assistive device or brace.  No gross ankle deformity noted.  No foot or ankle joint effusion is noted.  One notes a hammertoe contracture of the second digit " bilaterally.       Assessment:  1.  Diabetes Mellitus and Peripheral Neuropathy.    2.  Callus left foot    3.  Diminished pulses bilaterally.       Plan:  I have explained to the patient the condition.  I have discussed with the patient the importance of diabetic foot care.  The callus was sharply debrided.  The patient was ordered ultrasound BILL exam today for further evaluation of the lower extremity vascular status.       SERGEY Irvin D.P.M., LYNDA.EDER.

## 2017-01-27 ENCOUNTER — TELEPHONE (OUTPATIENT)
Dept: OTHER | Facility: CLINIC | Age: 72
End: 2017-01-27

## 2017-01-27 ENCOUNTER — TELEPHONE (OUTPATIENT)
Dept: PODIATRY | Facility: CLINIC | Age: 72
End: 2017-01-27

## 2017-01-27 NOTE — TELEPHONE ENCOUNTER
I placed a vascular  order for him to be seen.  There appears to be possible microcirculation problem in the left foot based off the BILL exam.

## 2017-01-27 NOTE — TELEPHONE ENCOUNTER
"Pt referred to [facility] by Roque Irvin DPM for diminished pulses in lower extremity and toe cyanosis.     1-26-17 BILL, Dr. Rodriguez notes   \"IMPRESSION: Spurious resting ankle-brachial indices bilaterally due to  calcified noncompressible vessels. Probable small vessel digital  disease in the left foot with abnormal digital waveform.\"     Pt needs to be scheduled for consult with vascular surgery.  Will route to scheduling to coordinate an appointment as soon as possible.    Marla Jean RN BSN    "

## 2017-01-27 NOTE — TELEPHONE ENCOUNTER
Pt called noting referral, pt notes since 2003 has decreased feeling in left leg, no feeling from knee down, intermittent swelling of of legs, with purple toes and toes turning deep purple over for awhile now. Pt notes he elevates legs when sitting.   Pt notes Hx of spinal sx 3 years ago and left foot drop.     Pt requests to be scheduled in [place], appt schedule with Dr. Triana on 2-7-17.       Marla Jean, RN, BSN.

## 2017-01-27 NOTE — TELEPHONE ENCOUNTER
Reason for Call:  Request for results:    Name of test or procedure: US    Date of test of procedure: 1/27    Location of the test or procedure: [place]    OK to leave the result message on voice mail or with a family member? YES    Phone number Patient can be reached at:  Home number on file [phone] (home)    Additional comments: pt calling stating he is looking for results     Call taken on 1/27/2017 at 11:09 AM by Odette Leal

## 2017-01-27 NOTE — TELEPHONE ENCOUNTER
Results for orders placed or performed during the hospital encounter of 01/26/17   US BILL Doppler No Exercise    Narrative    ULTRASOUND BILL DOPPLER NO EXERCISE  1/26/2017 2:49 PM     HISTORY: Type 2 diabetes mellitus with polyneuropathy and diminished  pedal pulses.    FINDINGS: A limited resting study was performed. The right common  femoral, popliteal, posterior tibial, and dorsalis pedis waveforms are  biphasic. The left common femoral waveform is triphasic. Left  popliteal and posterior tibial waveforms are biphasic and the left  dorsalis pedis waveform is monophasic to weakly biphasic.    The resting ankle-brachial indices are spurious bilaterally due to  calcified noncompressible vessels with elevated pressures degraded to  254 mmHg bilaterally.    The right digital waveform is normal. The left digital waveform is  abnormal with flat line waveform.      Impression    IMPRESSION: Spurious resting ankle-brachial indices bilaterally due to  calcified noncompressible vessels. Probable small vessel digital  disease in the left foot with abnormal digital waveform.    MARTELL JONES MD     Results as noted.    Dr. Irvin please advise.    JESSICA Pinto,  ATC

## 2017-02-02 ENCOUNTER — OFFICE VISIT (OUTPATIENT)
Dept: FAMILY MEDICINE | Facility: CLINIC | Age: 72
End: 2017-02-02
Payer: MEDICARE

## 2017-02-02 VITALS
TEMPERATURE: 97.7 F | OXYGEN SATURATION: 93 % | SYSTOLIC BLOOD PRESSURE: 122 MMHG | BODY MASS INDEX: 35.93 KG/M2 | WEIGHT: 251 LBS | DIASTOLIC BLOOD PRESSURE: 68 MMHG | HEART RATE: 71 BPM | HEIGHT: 70 IN

## 2017-02-02 DIAGNOSIS — J43.1 PANLOBULAR EMPHYSEMA (H): ICD-10-CM

## 2017-02-02 DIAGNOSIS — J42 CHRONIC BRONCHITIS, UNSPECIFIED CHRONIC BRONCHITIS TYPE (H): Primary | ICD-10-CM

## 2017-02-02 DIAGNOSIS — E11.40 TYPE 2 DIABETES MELLITUS WITH DIABETIC NEUROPATHY, WITHOUT LONG-TERM CURRENT USE OF INSULIN (H): ICD-10-CM

## 2017-02-02 PROCEDURE — 84439 ASSAY OF FREE THYROXINE: CPT | Performed by: FAMILY MEDICINE

## 2017-02-02 PROCEDURE — 82565 ASSAY OF CREATININE: CPT | Performed by: FAMILY MEDICINE

## 2017-02-02 PROCEDURE — 99214 OFFICE O/P EST MOD 30 MIN: CPT | Performed by: FAMILY MEDICINE

## 2017-02-02 PROCEDURE — 84443 ASSAY THYROID STIM HORMONE: CPT | Performed by: FAMILY MEDICINE

## 2017-02-02 PROCEDURE — 84132 ASSAY OF SERUM POTASSIUM: CPT | Performed by: FAMILY MEDICINE

## 2017-02-02 RX ORDER — CEFUROXIME AXETIL 500 MG/1
500 TABLET ORAL 2 TIMES DAILY
Qty: 20 TABLET | Refills: 3 | COMMUNITY
Start: 2016-12-12 | End: 2017-09-14

## 2017-02-02 NOTE — NURSING NOTE
"Chief Complaint   Patient presents with     Diabetes     Recheck on diabetes.       Initial /68 mmHg  Pulse 71  Temp(Src) 97.7  F (36.5  C) (Tympanic)  Ht 5' 10\" (1.778 m)  Wt 251 lb (113.853 kg)  BMI 36.01 kg/m2  SpO2 93% Estimated body mass index is 36.01 kg/(m^2) as calculated from the following:    Height as of this encounter: 5' 10\" (1.778 m).    Weight as of this encounter: 251 lb (113.853 kg).  BP completed using cuff size: large  "

## 2017-02-02 NOTE — PATIENT INSTRUCTIONS
Thank you for choosing [facility] [place].  You may be receiving a survey in the mail from Ariana Mendiola regarding your visit today.  Please take a few minutes to complete and return the survey to let us know how we are doing.      If you have questions or concerns, please contact us via [employer] or you can contact your care team at [phone].    Our Clinic hours are:  Monday 6:40 am  to 7:00 pm  Tuesday -Friday 6:40 am to 5:00 pm    The [place] outpatient lab hours are:  Monday - Friday 6:10 am to 4:45 pm  Saturdays 7:00 am to 11:00 am  Appointments are required, call [phone]    If you have clinical questions after hours or would like to schedule an appointment,  call the clinic at [phone].    (E11.40) Type 2 diabetes mellitus with diabetic neuropathy, without long-term current use of insulin (H)  Comment:   Plan: cefUROXime (CEFTIN) 500 MG tablet, Hemoglobin         A1c, TSH, T4 FREE, Creatinine, CBC with         platelets differential, Potassium,   The Hgb A1c was high at 8.2%, and the goal is under 8.0% and best under 7.0%. We will reorder the A1c test for 3-6 months. Do this 2 months after any dose of Prednisone.   We should always notify you of the results. [employer] results are available as soon as the doctor sees them. Use the dietary and exercise and weight instructions.   Do the daily foot care. Also see the Vascular clinic at [facility]. See the eye doctor annually.

## 2017-02-02 NOTE — MR AVS SNAPSHOT
After Visit Summary   2/2/2017    Stef Bhatt    MRN: [id number]           Patient Information     Date Of Birth          1945        Visit Information        Provider Department      2/2/2017 10:40 AM Marcelino Foster MD [facility]        Today's Diagnoses     Chronic bronchitis, unspecified chronic bronchitis type (H)    -  1     Panlobular emphysema (H)         Type 2 diabetes mellitus with diabetic neuropathy, without long-term current use of insulin (H)           Care Instructions          Thank you for choosing [facility]  You may be receiving a [facility] in the [facility] from [facility] your [facility] today.  Please take a few minutes to complete and return the survey to let us know how we are doing.      If you have questions or concerns, please contact us via EarlyTracks or you can contact your care team at [phone].    Our Clinic hours are:  Monday 6:40 am  to 7:00 pm  Tuesday -Friday 6:40 am to 5:00 pm    The [place] outpatient lab hours are:  Monday - Friday 6:10 am to 4:45 pm  Saturdays 7:00 am to 11:00 am  Appointments are required, call [phone]    If you have clinical questions after hours or would like to schedule an appointment,  call the clinic at [phone].    (E11.40) Type 2 diabetes mellitus with diabetic neuropathy, without long-term current use of insulin (H)  Comment:   Plan: cefUROXime (CEFTIN) 500 MG tablet, Hemoglobin         A1c, TSH, T4 FREE, Creatinine, CBC with         platelets differential, Potassium,   The Hgb A1c was high at 8.2%, and the goal is under 8.0% and best under 7.0%. We will reorder the A1c test for 3-6 months. Do this 2 months after any dose of Prednisone.   We should always notify you of the results. EarlyTracks results are available as soon as the doctor sees them. Use the dietary and exercise and weight instructions.   Do the daily foot care. Also see the Vascular clinic at [facility]. See the eye doctor annually.  "        Follow-ups after your visit        Your next 10 appointments already scheduled     Feb 07, 2017  9:30 AM   [facility]    [address] [address]   [phone]            Mar 16, 2017 11:00 AM   Anticoagulation Visit with [place]   [facility]    [address] [address]   [phone]              Future tests that were ordered for you today     Open Future Orders        Priority Expected Expires Ordered    Hemoglobin A1c Routine 5/4/2017 8/3/2017 2/2/2017            Who to contact     If you have questions or need follow up information about today's clinic visit or your schedule please contact [facility] directly at [phone].  Normal or non-critical lab and imaging results will be communicated to you by MyChart, letter or phone within 4 business days after the clinic has received the results. If you do not hear from us within 7 days, please contact the clinic through Greenexthart or phone. If you have a critical or abnormal lab result, we will notify you by phone as soon as possible.  Submit refill requests through Taulia or call your pharmacy and they will forward the refill request to us. Please allow 3 business days for your refill to be completed.          Additional Information About Your Visit        MyChart Information     Taulia lets you send messages to your doctor, view your test results, renew your prescriptions, schedule appointments and more. To sign up, go to [email or web address] . Click on \"Log in\" on the left side of the screen, which will take you to the Welcome page. Then click on \"Sign up Now\" on the right side of the page.     You will be asked to enter the access code listed below, as well as some personal information. Please follow the directions to create your username and password.     Your " "access code is: [email or web address]  Expires: 3/2/2017 10:56 AM     Your access code will  in 90 days. If you need help or a new code, please call your [place] clinic or [phone].        Care EveryWhere ID     This is your Care EveryWhere ID. This could be used by other organizations to access your [place] medical records  [id number]        Your Vitals Were     Pulse Temperature Height BMI (Body Mass Index) Pulse Oximetry       71 97.7  F (36.5  C) (Tympanic) 5' 10\" (1.778 m) 36.01 kg/m2 93%        Blood Pressure from Last 3 Encounters:   17 122/68   17 103/69   16 129/69    Weight from Last 3 Encounters:   17 251 lb (113.853 kg)   17 255 lb (115.667 kg)   17 255 lb 9.6 oz (115.939 kg)              We Performed the Following     CBC with platelets differential     Creatinine     Potassium     T4 FREE     TSH          Today's Medication Changes          These changes are accurate as of: 17 11:30 AM.  If you have any questions, ask your nurse or doctor.               Stop taking these medicines if you haven't already. Please contact your care team if you have questions.     hydrocortisone 2.5 % cream   Commonly known as:  ANUSOL-HC   Stopped by:  Marcelino Foster MD                    Primary Care Provider Office Phone # Fax #    Marcelino Foster [phone]       [facility] [address] [address]        Thank you!     Thank you for choosing [facility]  for your care. Our goal is always to provide you with excellent care. Hearing back from our patients is one way we can continue to improve our services. Please take a few minutes to complete the written survey that you may receive in the mail after your visit with us. Thank you!             Your Updated Medication List - Protect others around you: Learn how to safely use, store and throw away your medicines at www.disposemymeds.org.          This list is " accurate as of: 2/2/17 11:30 AM.  Always use your most recent med list.                   Brand Name Dispense Instructions for use    albuterol 108 (90 BASE) MCG/ACT Inhaler    VENTOLIN HFA    1 Inhaler    Inhale 2 puffs into the lungs every 4 hours as needed Pt will call to order       ALLEGRA 180 MG tablet   Generic drug:  fexofenadine     30 tablet    Take 180 mg by mouth every evening       ascorbic acid 1000 MG Tabs    vitamin C     Take 1,000 mg by mouth daily In winter only       aspirin 81 MG tablet     1 tablet    Take 81 mg by mouth every evening.       blood glucose monitoring lancets     100 Each    use as directed to test blood sugars up to once daily; pt will call to order       * blood glucose monitoring test strip    no brand specified    3 Month    Use as directed to check sugars twice a day       * blood glucose monitoring test strip    ACCU-CHEK COMPACT DRUM    270 strip    Use to test blood sugar three times daily or as directed.       * cefUROXime 500 MG tablet    CEFTIN    20 tablet    Take 1 tablet (500 mg) by mouth 2 times daily       * cefUROXime 500 MG tablet    CEFTIN         glipiZIDE 10 MG 24 hr tablet    glipiZIDE XL    180 tablet    Take 2 in the am       INCRUSE ELLIPTA 62.5 MCG/INH oral inhaler   Generic drug:  umeclidinium      Inhale 1 capsule (62.5 mcg) into the lungs daily       ipratropium - albuterol 0.5 mg/2.5 mg/3 mL 0.5-2.5 (3) MG/3ML neb solution    DUONEB     Take 1 vial by nebulization every 6 hours as needed for shortness of breath / dyspnea or wheezing       ketoconazole 2 % shampoo    NIZORAL    120 mL    Apply daily then rinse. Pt will call to order       losartan 50 MG tablet    COZAAR    90 tablet    Take 1 tablet (50 mg) by mouth daily       metFORMIN 500 MG tablet    GLUCOPHAGE    90 tablet    Take 1 tablet (500 mg) by mouth daily (with dinner)       metoprolol 50 MG 24 hr tablet    TOPROL-XL    270 tablet    100 mg in am, 50 mg in pm       MUCINEX 600 MG 12 hr  tablet   Generic drug:  guaiFENesin      Take 400-600 mg by mouth 2 times daily       nitroglycerin 0.4 MG sublingual tablet    NITROSTAT    25 tablet    Place 1 tablet (0.4 mg) under the tongue every 5 minutes as needed for chest pain       predniSONE 10 MG tablet    DELTASONE     Take 10 mg by mouth daily       pregabalin 50 MG capsule    LYRICA    120 capsule    Take 2 in the am, one at noon and one in the pm.       PSYLLIUM HUSK          simvastatin 20 MG tablet    ZOCOR    90 tablet    Take 1 tablet (20 mg) by mouth daily       TYLENOL 500 MG tablet   Generic drug:  acetaminophen      Take 500 mg by mouth every 6 hours as needed for mild pain       TYLENOL PM EXTRA STRENGTH  MG tablet   Generic drug:  diphenhydrAMINE-acetaminophen      Take 1 tablet by mouth nightly as needed for sleep       warfarin 2 MG tablet    COUMADIN    150 tablet    As directed by Anticoagulation Clinic (current dose 2mg MF, 4mg rest of the week)       * Notice:  This list has 4 medication(s) that are the same as other medications prescribed for you. Read the directions carefully, and ask your doctor or other care provider to review them with you.

## 2017-02-02 NOTE — PROGRESS NOTES
SUBJECTIVE:                                                    Stef Bhatt is a 71 year old male who presents to clinic today for the following health issues:      Diabetes Follow-up      Patient is checking blood sugars: Yes, he states his numbers have been pretty well.  98 for a 4 hour fast.  Has had to take Prednisone at times and this can affect his sugars.    Diabetic concerns: other - Concerned about his A1C levels due to the Prednisone medication.     Symptoms of hypoglycemia (low blood sugar): Will have a strange feeling come over, can be dizzy or weak, sweating.  Varies with what symptoms he will have.  Can happen off and on.     Paresthesias (numbness or burning in feet) or sores: Yes See below for his feet and upcoming appointments.     Date of last diabetic eye exam: 2-, He will get an appointment for April this year.    The Hgb A1c was 8.2% recently, but he was on Prednisone in December.        FOOT:  He was seen by Dr. Irvin in January for left foot pain.  He has had testing done, doppler ultrasound 1-26-17.  He does have an upcoming appointment with vascular surgery on 2-7-17 at [facility].     HEMANGIOMA:  Recheck of area on the left upper arm.  He has had this area treated twice with cauterization.  The area seems to be back again.      Amount of exercise or physical activity: With his COPD he is limited to what he can do.    Problems taking medications regularly: No    Medication side effects: none    Diet: carbohydrate counting      Current outpatient prescriptions:      cefUROXime (CEFTIN) 500 MG tablet, Take 1 tablet (500 mg) by mouth 2 times daily, Disp: 20 tablet, Rfl: 3     cefUROXime (CEFTIN) 500 MG tablet, , Disp: , Rfl:      pregabalin (LYRICA) 50 MG capsule, Take 2 in the am, one at noon and one in the pm., Disp: 120 capsule, Rfl: 5     losartan (COZAAR) 50 MG tablet, Take 1 tablet (50 mg) by mouth daily, Disp: 90 tablet, Rfl: 3     metoprolol (TOPROL-XL) 50 MG 24 hr  tablet, 100 mg in am, 50 mg in pm, Disp: 270 tablet, Rfl: 3     simvastatin (ZOCOR) 20 MG tablet, Take 1 tablet (20 mg) by mouth daily, Disp: 90 tablet, Rfl: 3     glipiZIDE (GLIPIZIDE XL) 10 MG 24 hr tablet, Take 2 in the am, Disp: 180 tablet, Rfl: 3     metFORMIN (GLUCOPHAGE) 500 MG tablet, Take 1 tablet (500 mg) by mouth daily (with dinner), Disp: 90 tablet, Rfl: 3     nitroglycerin (NITROSTAT) 0.4 MG SL tablet, Place 1 tablet (0.4 mg) under the tongue every 5 minutes as needed for chest pain, Disp: 25 tablet, Rfl: 11     ketoconazole (NIZORAL) 2 % shampoo, Apply daily then rinse. Pt will call to order, Disp: 120 mL, Rfl: 11     warfarin (COUMADIN) 2 MG tablet, As directed by Anticoagulation Clinic (current dose 2mg MF, 4mg rest of the week), Disp: 150 tablet, Rfl: 3     predniSONE (DELTASONE) 10 MG tablet, Take 10 mg by mouth daily, Disp: , Rfl:      umeclidinium bromide (INCRUSE ELLIPTA) 62.5 MCG/INH inhalation capsule, Inhale 1 capsule (62.5 mcg) into the lungs daily, Disp: , Rfl:      hydrocortisone (ANUSOL-HC) 2.5 % rectal cream, Place rectally 2 times daily, Disp: 30 g, Rfl: 3     diphenhydrAMINE-acetaminophen (TYLENOL PM EXTRA STRENGTH)  MG tablet, Take 1 tablet by mouth nightly as needed for sleep, Disp: , Rfl:      acetaminophen (TYLENOL) 500 MG tablet, Take 500 mg by mouth every 6 hours as needed for mild pain, Disp: , Rfl:      albuterol (VENTOLIN HFA) 108 (90 BASE) MCG/ACT inhaler, Inhale 2 puffs into the lungs every 4 hours as needed Pt will call to order, Disp: 1 Inhaler, Rfl: 11     blood glucose (ACCU-CHEK COMPACT DRUM) test strip, Use to test blood sugar three times daily or as directed., Disp: 270 strip, Rfl: 1     PSYLLIUM HUSK, , Disp: , Rfl:      guaiFENesin (MUCINEX) 600 MG 12 hr tablet, Take 400-600 mg by mouth 2 times daily , Disp: , Rfl:      glucose blood VI test strips strip, Use as directed to check sugars twice a day, Disp: 3 Month, Rfl: 3     fexofenadine (ALLEGRA) 180 MG  "tablet, Take 180 mg by mouth every evening , Disp: 30 tablet, Rfl: 1     aspirin 81 MG tablet, Take 81 mg by mouth every evening., Disp: 1 tablet, Rfl: 3     ACCU-CHEK SOFTCLIX LANCETS [facility], use as directed to test blood sugars up to once daily; pt will call to order, Disp: 100 Each, Rfl: 11     ipratropium - albuterol 0.5 mg/2.5 mg/3 mL (DUONEB) 0.5-2.5 (3) MG/3ML nebulization, Take 1 vial by nebulization every 6 hours as needed for shortness of breath / dyspnea or wheezing, Disp: , Rfl:      ascorbic acid (VITAMIN C) 1000 MG TABS, Take 1,000 mg by mouth daily In winter only, Disp: , Rfl:     Patient Active Problem List   Diagnosis     Coronary atherosclerosis     Essential hypertension     Hemorrhage of rectum and anus     Allergic Rhinitis     AK (actinic keratosis)     HYPERLIPIDEMIA LDL GOAL <100     Ventral hernia     Colon polyp     Dysphonia     Senile nuclear sclerosis     Acute myocardial infarction of other specified sites, episode of care unspecified     Health Care Home     Atrial fibrillation (H)     Advance Care Planning     Edge's neuroma     COPD (chronic obstructive pulmonary disease) (H)     Long term current use of anticoagulant therapy     Hyperlipidemia with target LDL less than 100     Morbid obesity (H)     Type 2 diabetes mellitus with diabetic neuropathy (H)     Peripheral polyneuropathy (H)     Chronic bronchitis, unspecified chronic bronchitis type (H)     DJD (degenerative joint disease), cervical     Benign neoplasm of skin of trunk, except scrotum       Blood pressure 122/68, pulse 71, temperature 97.7  F (36.5  C), temperature source Tympanic, height 5' 10\" (1.778 m), weight 251 lb (113.853 kg), SpO2 93 %.  Exam:  GENERAL APPEARANCE: healthy, alert and no distress  EYES: EOMI,  PERRL  NECK: no adenopathy, no asymmetry, masses, or scars and thyroid normal to palpation  RESP: lungs clear to auscultation - no rales, rhonchi or wheezes  CV: regular rates and rhythm, normal S1 S2, no S3 " or S4 and no murmur, click or rub -  SKIN: no suspicious lesions or rashes  PSYCH: mentation appears normal and affect normal/bright     The feet have poor or no pulses. There is erythema of the feet that do not shaneka. He has one wart or callus on the left foot plantar.         (E11.40) Type 2 diabetes mellitus with diabetic neuropathy, without long-term current use of insulin (H)  Comment:   Plan: cefUROXime (CEFTIN) 500 MG tablet, Hemoglobin         A1c, TSH, T4 FREE, Creatinine, CBC with         platelets differential, Potassium,   The Hgb A1c was high at 8.2%, and the goal is under 8.0% and best under 7.0%. We will reorder the A1c test for 3-6 months. Do this 2 months after any dose of Prednisone.   We should always notify you of the results. MyChart results are available as soon as the doctor sees them. Use the dietary and exercise and weight instructions.   Do the daily foot care. Also see the Vascular clinic at [facility]. See the eye doctor annually.     (J42) Chronic bronchitis, unspecified chronic bronchitis type (H)    Comment:   Plan: cefUROXime (CEFTIN) 500 MG tablet, Hemoglobin         A1c, TSH, T4 FREE        Use the albuterol as needed. He had the flu vaccine. Use the antibiotic as discussed. Avoid contagious exposures. Get the flu shot annually. Use good hygiene.       Marcelino Foster

## 2017-02-03 DIAGNOSIS — G62.9 PERIPHERAL POLYNEUROPATHY: Primary | ICD-10-CM

## 2017-02-03 DIAGNOSIS — Z79.01 LONG TERM (CURRENT) USE OF ANTICOAGULANTS: ICD-10-CM

## 2017-02-03 LAB
BASOPHILS # BLD AUTO: NORMAL 10E9/L (ref 0–0.2)
BASOPHILS NFR BLD AUTO: NORMAL %
CREAT SERPL-MCNC: NORMAL MG/DL (ref 0.66–1.25)
DIFFERENTIAL METHOD BLD: NORMAL
EOSINOPHIL # BLD AUTO: NORMAL 10E9/L (ref 0–0.7)
EOSINOPHIL NFR BLD AUTO: NORMAL %
ERYTHROCYTE [DISTWIDTH] IN BLOOD BY AUTOMATED COUNT: NORMAL % (ref 10–15)
GFR SERPL CREATININE-BSD FRML MDRD: NORMAL ML/MIN/1.7M2
HCT VFR BLD AUTO: NORMAL % (ref 40–53)
HGB BLD-MCNC: NORMAL G/DL (ref 13.3–17.7)
LYMPHOCYTES # BLD AUTO: NORMAL 10E9/L (ref 0.8–5.3)
LYMPHOCYTES NFR BLD AUTO: NORMAL %
MCH RBC QN AUTO: NORMAL PG (ref 26.5–33)
MCHC RBC AUTO-ENTMCNC: NORMAL G/DL (ref 31.5–36.5)
MCV RBC AUTO: NORMAL FL (ref 78–100)
MONOCYTES # BLD AUTO: NORMAL 10E9/L (ref 0–1.3)
MONOCYTES NFR BLD AUTO: NORMAL %
NEUTROPHILS # BLD AUTO: NORMAL 10E9/L (ref 1.6–8.3)
NEUTROPHILS NFR BLD AUTO: NORMAL %
PLATELET # BLD AUTO: NORMAL 10E9/L (ref 150–450)
POTASSIUM SERPL-SCNC: NORMAL MMOL/L (ref 3.4–5.3)
RBC # BLD AUTO: NORMAL 10E12/L (ref 4.4–5.9)
T4 FREE SERPL-MCNC: NORMAL NG/DL (ref 0.76–1.46)
TSH SERPL DL<=0.05 MIU/L-ACNC: NORMAL MU/L (ref 0.4–4)
WBC # BLD AUTO: NORMAL 10E9/L (ref 4–11)

## 2017-02-03 NOTE — TELEPHONE ENCOUNTER
Warfarin   Last Written Prescription Date: 03/11/16  Last Fill Qty: 150, # refills: 3  Last Office Visit with [facility], [facility] or [facility] prescribing provider: 02/02/17       Date and Result of Last PT/INR:   INR      3.0   1/12/2017  INR      2.8   11/10/2016  INR     1.97   6/16/2014  INR     1.08   2/2/2012     Lyrica    Last Written Prescription Date: 10/19/16  Last Fill Quantity: 120,  # refills: 5   Last Office Visit with [facility], [facility] or [facility] prescribing provider: 02/2/17

## 2017-02-07 ENCOUNTER — OFFICE VISIT (OUTPATIENT)
Dept: SURGERY | Facility: CLINIC | Age: 72
End: 2017-02-07
Payer: MEDICARE

## 2017-02-07 VITALS
BODY MASS INDEX: 35.93 KG/M2 | TEMPERATURE: 97.8 F | DIASTOLIC BLOOD PRESSURE: 66 MMHG | WEIGHT: 251 LBS | SYSTOLIC BLOOD PRESSURE: 122 MMHG | HEART RATE: 69 BPM | HEIGHT: 70 IN

## 2017-02-07 DIAGNOSIS — I73.9 PAD (PERIPHERAL ARTERY DISEASE) (H): Primary | ICD-10-CM

## 2017-02-07 DIAGNOSIS — Z72.0 TOBACCO ABUSE: ICD-10-CM

## 2017-02-07 PROCEDURE — 99214 OFFICE O/P EST MOD 30 MIN: CPT | Performed by: SURGERY

## 2017-02-07 NOTE — MR AVS SNAPSHOT
"              After Visit Summary   2/7/2017    Stef Bhatt    MRN: [id number]           Patient Information     Date Of Birth          1945        Visit Information        Provider Department      2/7/2017 9:30 AM Ollie Triana MD [facility]        Today's Diagnoses     Tobacco abuse    -  1       Care Instructions    Per Physician's instructions        Follow-ups after your visit        Your next 10 appointments already scheduled     Mar 16, 2017 11:00 AM   Anticoagulation Visit with [place] AN[place]   [facility] ([facility]    [address] [address]   [phone]              Future tests that were ordered for you today     Open Future Orders        Priority Expected Expires Ordered    US Aorta Medicare AAA Screening Routine  2/7/2018 2/7/2017            Who to contact     If you have questions or need follow up information about today's clinic visit or your schedule please contact [facility] directly at [phone].  Normal or non-critical lab and imaging results will be communicated to you by MyChart, letter or phone within 4 business days after the clinic has received the results. If you do not hear from us within 7 days, please contact the clinic through [email or web address] or phone. If you have a critical or abnormal lab result, we will notify you by phone as soon as possible.  Submit refill requests through Reelation or call your pharmacy and they will forward the refill request to us. Please allow 3 business days for your refill to be completed.          Additional Information About Your Visit        MyChart Information     Reelation lets you send messages to your doctor, view your test results, renew your prescriptions, schedule appointments and more. To sign up, go to [email or web address] . Click on \"Log in\" on the left side of the screen, which will take you to the Welcome page. Then click on \"Sign up Now\" on the right " "side of the page.     You will be asked to enter the access code listed below, as well as some personal information. Please follow the directions to create your username and password.     Your access code is: [email or web address]  Expires: 3/2/2017 10:56 AM     Your access code will  in 90 days. If you need help or a new code, please call your [place] clinic or [phone].        Care EveryWhere ID     This is your Care EveryWhere ID. This could be used by other organizations to access your [place] medical records  [id number]        Your Vitals Were     Pulse Temperature Height BMI (Body Mass Index)          67 97.8  F (36.6  C) (Oral) 1.778 m (5' 10\") 36.01 kg/m2         Blood Pressure from Last 3 Encounters:   17 137/80   17 122/68   17 103/69    Weight from Last 3 Encounters:   17 113.853 kg (251 lb)   17 113.853 kg (251 lb)   17 115.667 kg (255 lb)               Primary Care Provider Office Phone # Fax #    Marcelino Foster [phone]       [facility] [address] [address]        Thank you!     Thank you for choosing [facility]  for your care. Our goal is always to provide you with excellent care. Hearing back from our patients is one way we can continue to improve our services. Please take a few minutes to complete the written survey that you may receive in the mail after your visit with us. Thank you!             Your Updated Medication List - Protect others around you: Learn how to safely use, store and throw away your medicines at www.disposemymeds.org.          This list is accurate as of: 17  9:58 AM.  Always use your most recent med list.                   Brand Name Dispense Instructions for use    albuterol 108 (90 BASE) MCG/ACT Inhaler    VENTOLIN HFA    1 Inhaler    Inhale 2 puffs into the lungs every 4 hours as needed Pt will call to order       ALLEGRA 180 MG tablet   Generic drug:  " fexofenadine     30 tablet    Take 180 mg by mouth every evening       ascorbic acid 1000 MG Tabs    vitamin C     Take 1,000 mg by mouth daily In winter only       aspirin 81 MG tablet     1 tablet    Take 81 mg by mouth every evening.       blood glucose monitoring lancets     100 Each    use as directed to test blood sugars up to once daily; pt will call to order       * blood glucose monitoring test strip    no brand specified    3 Month    Use as directed to check sugars twice a day       * blood glucose monitoring test strip    ACCU-CHEK COMPACT DRUM    270 strip    Use to test blood sugar three times daily or as directed.       * cefUROXime 500 MG tablet    CEFTIN    20 tablet    Take 1 tablet (500 mg) by mouth 2 times daily       * cefUROXime 500 MG tablet    CEFTIN         glipiZIDE 10 MG 24 hr tablet    glipiZIDE XL    180 tablet    Take 2 in the am       INCRUSE ELLIPTA 62.5 MCG/INH oral inhaler   Generic drug:  umeclidinium      Inhale 1 capsule (62.5 mcg) into the lungs daily       ipratropium - albuterol 0.5 mg/2.5 mg/3 mL 0.5-2.5 (3) MG/3ML neb solution    DUONEB     Take 1 vial by nebulization every 6 hours as needed for shortness of breath / dyspnea or wheezing       ketoconazole 2 % shampoo    NIZORAL    120 mL    Apply daily then rinse. Pt will call to order       losartan 50 MG tablet    COZAAR    90 tablet    Take 1 tablet (50 mg) by mouth daily       metFORMIN 500 MG tablet    GLUCOPHAGE    90 tablet    Take 1 tablet (500 mg) by mouth daily (with dinner)       metoprolol 50 MG 24 hr tablet    TOPROL-XL    270 tablet    100 mg in am, 50 mg in pm       MUCINEX 600 MG 12 hr tablet   Generic drug:  guaiFENesin      Take 400-600 mg by mouth 2 times daily       nitroglycerin 0.4 MG sublingual tablet    NITROSTAT    25 tablet    Place 1 tablet (0.4 mg) under the tongue every 5 minutes as needed for chest pain       predniSONE 10 MG tablet    DELTASONE     Take 10 mg by mouth daily       pregabalin 50  MG capsule    LYRICA    120 capsule    Take 2 in the am, one at noon and one in the pm.       PSYLLIUM HUSK          simvastatin 20 MG tablet    ZOCOR    90 tablet    Take 1 tablet (20 mg) by mouth daily       TYLENOL 500 MG tablet   Generic drug:  acetaminophen      Take 500 mg by mouth every 6 hours as needed for mild pain       TYLENOL PM EXTRA STRENGTH  MG tablet   Generic drug:  diphenhydrAMINE-acetaminophen      Take 1 tablet by mouth nightly as needed for sleep       warfarin 2 MG tablet    COUMADIN    150 tablet    As directed by Anticoagulation Clinic (current dose 2mg MF, 4mg rest of the week)       * Notice:  This list has 4 medication(s) that are the same as other medications prescribed for you. Read the directions carefully, and ask your doctor or other care provider to review them with you.

## 2017-02-07 NOTE — NURSING NOTE
"Initial /80 mmHg  Pulse 67  Temp(Src) 97.8  F (36.6  C) (Oral)  Ht 1.778 m (5' 10\")  Wt 113.853 kg (251 lb)  BMI 36.01 kg/m2 Estimated body mass index is 36.01 kg/(m^2) as calculated from the following:    Height as of this encounter: 1.778 m (5' 10\").    Weight as of this encounter: 113.853 kg (251 lb). .    Dayana Langley MA    "

## 2017-02-08 NOTE — PROGRESS NOTES
2017      CLINIC NOTE      Re:  Stef Bhatt,  1945      PRESENTING COMPLAINT:  Peripheral arterial disease.      HISTORY OF PRESENTING ILLNESS:  Mr. Bhatt is a 71-year-old gentleman whom we have been asked to see by Dr. Irvin for evaluation of peripheral arterial disease.  The patient tells me that he does not really have any symptoms of claudication or any nonhealing wounds; however, it is discoloration of his toes that has brought this to attention.  He tells me many years ago when he was undergoing coronary artery bypass graft he was told that he has peripheral arterial disease, and it was also asymptomatic at that time.  He also has a chronic left drop foot.      PAST MEDICAL HISTORY:   1.  Morbid obesity.   2.  Coronary artery disease.   3.  Hypertension.   4.  Hyperlipidemia.   5.  History of myocardial infarction.   6.  Atrial fibrillation.   7.  Chronic obstructive pulmonary disease.   8.  Diabetes.   9.  Diabetic polyneuropathy.   10.  Degenerative joint disease.      PAST SURGICAL HISTORY:   1.  Back surgery.   2.  Coronary artery bypass grafting, done in .      SOCIAL HISTORY:  He used to work for the [employer].  He quit smoking 11 years ago.      FAMILY HISTORY:  His brother has coronary artery disease.  Mother had vein stripping.      REVIEW OF SYSTEMS:  Positive for weakness on the left side of his body with gait disturbance.  He also reports recurrent chronic obstructive pulmonary disease exacerbations for which he has taken prednisone and antibiotics.  He tells me that his blood sugar control is reasonable.      PHYSICAL EXAMINATION:   GENERAL:  He appears comfortable, is in no acute distress.   VITAL SIGNS:  Reviewed.   Re:  Stef Bhatt, page 2      HEENT:  Head is atraumatic, normocephalic, mucosa are pink.   EYES:  Extraocular motions are intact.  Sclerae are anicteric.   MENTAL:  Alert and oriented.  Judgment and insight are good.    LYMPHATIC:  No supraclavicular or cervical adenopathy noted.   CARDIOVASCULAR:  Regular rate and rhythm.  S1 plus S2 plus 0.   RESPIRATORY:  Good air entry bilaterally.  Good respiratory effort, no accessory muscles are being used.   ABDOMEN:  Soft, obese, nontender.  No hepatosplenomegaly is appreciated.   EXTREMITIES:  No clubbing or cyanosis.  There is dependent rubor in his left toes.   VASCULAR:  No carotid bruits.  3+ bilateral radial pulses.  Left dorsalis pedis is monophasic.  Left posterior tibial is biphasic.  Right posterior tibial and dorsalis pedis are both monophasic.      IMAGING DATA:  He underwent noninvasive imaging.  He has noncompressible vessels with monophasic waveforms on the Doppler and a flat line on the left big toe.      DIAGNOSIS:  Peripheral artery disease, symptomatic.      PLAN:  Mr. Bhatt does have peripheral arterial disease as can be expected in somebody with longstanding diabetes, history of tobacco abuse, and coronary artery disease.  However, it is still asymptomatic.  He does not have any nonhealing wounds or ulcers.  I would not recommend any further intervention at this time.  I have, however, warned him if he develops nonhealing wounds or pain in his feet and legs, then he should come back for evaluation.  With his history of coronary artery disease and tobacco abuse, I would recommend doing a screening ultrasound for abdominal aortic aneurysm.  All questions were answered, and all concerns were addressed.            MD BENITA Guillory/maira      Dictation #[id number]  D: 2017  T: 2017         SHANIKA PATEL MD             D: 2017 10:19   T: 2017 07:42   MT: maira      Name:     JAMARCUS BHATT   MRN:      [id number]        Account:      [id number]   :      1945           Service Date: 2017      Document: [id number]

## 2017-02-09 RX ORDER — WARFARIN SODIUM 2 MG/1
TABLET ORAL
Qty: 150 TABLET | Refills: 3 | Status: SHIPPED | OUTPATIENT
Start: 2017-02-09 | End: 2018-04-26

## 2017-02-09 RX ORDER — PREGABALIN 50 MG/1
CAPSULE ORAL
Qty: 120 CAPSULE | Refills: 5 | Status: SHIPPED | OUTPATIENT
Start: 2017-02-09 | End: 2017-04-12

## 2017-02-09 NOTE — TELEPHONE ENCOUNTER
Routing refill request to provider for review/approval because:  Drug not on the [facility] refill protocol     Flower Arteaga RN

## 2017-02-15 ENCOUNTER — HOSPITAL ENCOUNTER (OUTPATIENT)
Dept: ULTRASOUND IMAGING | Facility: CLINIC | Age: 72
Discharge: HOME | End: 2017-02-15
Attending: SURGERY | Admitting: SURGERY
Payer: MEDICARE

## 2017-02-15 DIAGNOSIS — Z72.0 TOBACCO ABUSE: ICD-10-CM

## 2017-02-15 PROCEDURE — 76706 US ABDL AORTA SCREEN AAA: CPT

## 2017-02-24 ENCOUNTER — TELEPHONE (OUTPATIENT)
Dept: OTHER | Facility: CLINIC | Age: 72
End: 2017-02-24

## 2017-02-24 NOTE — TELEPHONE ENCOUNTER
Pt had ULTRASOUND AORTA MEDICARE ABDOMINAL AORTIC ANEURYSM SCREENING on 2/15/2017.  Pt called requesting results and notes okay to leave voice message at [phone].     I spoke to Dr. Triana who notes imaging reviewed, pt does not have an aneurysm, no further follow up needed, okay to call pt and explain this to pt.     I called and left VM for pt explaining this and to call me if any further questions.     Marla Jean, RN, BSN.

## 2017-03-30 ENCOUNTER — ANTICOAGULATION THERAPY VISIT (OUTPATIENT)
Dept: ANTICOAGULATION | Facility: CLINIC | Age: 72
End: 2017-03-30
Payer: MEDICARE

## 2017-03-30 DIAGNOSIS — Z79.01 LONG TERM CURRENT USE OF ANTICOAGULANT THERAPY: ICD-10-CM

## 2017-03-30 LAB — INR POINT OF CARE: 2.3 (ref 0.86–1.14)

## 2017-03-30 PROCEDURE — 85610 PROTHROMBIN TIME: CPT | Mod: QW

## 2017-03-30 PROCEDURE — 36416 COLLJ CAPILLARY BLOOD SPEC: CPT

## 2017-03-30 PROCEDURE — 99207 ZZC NO CHARGE NURSE ONLY: CPT

## 2017-03-30 NOTE — PROGRESS NOTES
ANTICOAGULATION FOLLOW-UP CLINIC VISIT    Patient Name:  Stef Bhatt  Date:  3/30/2017  Contact Type:  Face to Face    SUBJECTIVE:     Patient Findings     Positives Change in diet/appetite (been eating fast food. Less Vit K and no wine.), Antibiotic use or infection (finished Ceftin yesterday)    Comments Last INR 1/12/17.             OBJECTIVE    INR Protime   Date Value Ref Range Status   03/30/2017 2.3 (A) 0.86 - 1.14 Final       ASSESSMENT / PLAN  INR assessment THER    Recheck INR In: 8 WEEKS 9 weeks   INR Location Clinic      Anticoagulation Summary as of 3/30/2017     INR goal 2.0-3.0   Today's INR 2.3   Maintenance plan 2 mg (2 mg x 1) on Mon, Fri; 4 mg (2 mg x 2) all other days   Full instructions 2 mg on Mon, Fri; 4 mg all other days   Weekly total 24 mg   No change documented Blanca Nixon RN   Plan last modified Noelle Harrington RN (4/6/2015)   Next INR check 6/1/2017   Priority INR   Target end date Indefinite    Indications   Atrial fibrillation (H) [I48.91]  Long term current use of anticoagulant therapy [Z79.01]         Anticoagulation Episode Summary     INR check location     Preferred lab     Send INR reminders to [facility]    Comments * warfarin 2 mg tabs. Dr Shelley Lennox is pulmonologist.       Anticoagulation Care Providers     Provider Role Specialty Phone number    Marcelino Foster MD [facility] [phone]            See the Encounter Report to view Anticoagulation Flowsheet and Dosing Calendar (Go to Encounters tab in chart review, and find the Anticoagulation Therapy Visit)    Blanca Nixon, RN

## 2017-03-30 NOTE — MR AVS SNAPSHOT
Stef Bhatt   3/30/2017 11:00 AM   Anticoagulation Therapy Visit    Description:  71 year old male   Provider:  [facility]   Department:  [facility]           INR as of 3/30/2017     Today's INR 2.3      Anticoagulation Summary as of 3/30/2017     INR goal 2.0-3.0   Today's INR 2.3   Full instructions 2 mg on Mon, Fri; 4 mg all other days   Next INR check 6/1/2017    Indications   Atrial fibrillation (H) [I48.91]  Long term current use of anticoagulant therapy [Z79.01]         Description     No change, recheck INR in 9 weeks.      Your next Anticoagulation Clinic appointment(s)     Mar 30, 2017 11:00 AM CDT   Anticoagulation Visit with [facility]   [facility]    [address] [address] [phone]              Contact Numbers     Please call [phone] to cancel and/or reschedule your appointment.  Please call [phone] with any problems or questions regarding your therapy          March 2017 Details    Sun Mon Tue Wed Thu Fri Sat        1               2               3               4                 5               6               7               8               9               10               11                 12               13               14               15               16               17               18                 19               20               21               22               23               24               25                 26               27               28               29               30      4 mg   See details      31      2 mg           Date Details   03/30 This INR check               How to take your warfarin dose     To take:  2 mg Take 1 of the 2 mg tablets.    To take:  4 mg Take 2 of the 2 mg tablets.           April 2017 Details    Sun Mon Tue Wed Thu Fri Sat           1      4 mg           2      4 mg         3      2 mg         4      4 mg         5      4 mg         6      4 mg          7      2 mg         8      4 mg           9      4 mg         10      2 mg         11      4 mg         12      4 mg         13      4 mg         14      2 mg         15      4 mg           16      4 mg         17      2 mg         18      4 mg         19      4 mg         20      4 mg         21      2 mg         22      4 mg           23      4 mg         24      2 mg         25      4 mg         26      4 mg         27      4 mg         28      2 mg         29      4 mg           30      4 mg                Date Details   No additional details            How to take your warfarin dose     To take:  2 mg Take 1 of the 2 mg tablets.    To take:  4 mg Take 2 of the 2 mg tablets.           May 2017 Details    Sun Mon Tue Wed Thu Fri Sat      1      2 mg         2      4 mg         3      4 mg         4      4 mg         5      2 mg         6      4 mg           7      4 mg         8      2 mg         9      4 mg         10      4 mg         11      4 mg         12      2 mg         13      4 mg           14      4 mg         15      2 mg         16      4 mg         17      4 mg         18      4 mg         19      2 mg         20      4 mg           21      4 mg         22      2 mg         23      4 mg         24      4 mg         25      4 mg         26      2 mg         27      4 mg           28      4 mg         29      2 mg         30      4 mg         31      4 mg             Date Details   No additional details            How to take your warfarin dose     To take:  2 mg Take 1 of the 2 mg tablets.    To take:  4 mg Take 2 of the 2 mg tablets.           June 2017 Details    Sun Mon Tue Wed Thu Fri Sat         1            2               3                 4               5               6               7               8               9               10                 11               12               13               14               15               16               17                 18               19                20               21               22               23               24                 25               26               27               28               29               30                 Date Details   No additional details    Date of next INR:  6/1/2017         How to take your warfarin dose     To take:  4 mg Take 2 of the 2 mg tablets.

## 2017-04-12 ENCOUNTER — OFFICE VISIT (OUTPATIENT)
Dept: FAMILY MEDICINE | Facility: CLINIC | Age: 72
End: 2017-04-12
Payer: MEDICARE

## 2017-04-12 VITALS
OXYGEN SATURATION: 95 % | SYSTOLIC BLOOD PRESSURE: 128 MMHG | WEIGHT: 249 LBS | HEIGHT: 70 IN | BODY MASS INDEX: 35.65 KG/M2 | HEART RATE: 73 BPM | TEMPERATURE: 96.9 F | DIASTOLIC BLOOD PRESSURE: 81 MMHG

## 2017-04-12 DIAGNOSIS — B07.0 PLANTAR WARTS: Primary | ICD-10-CM

## 2017-04-12 DIAGNOSIS — G62.9 PERIPHERAL POLYNEUROPATHY: ICD-10-CM

## 2017-04-12 DIAGNOSIS — D23.5 BENIGN NEOPLASM OF SKIN OF TRUNK, EXCEPT SCROTUM: ICD-10-CM

## 2017-04-12 PROCEDURE — 17110 DESTRUCTION B9 LES UP TO 14: CPT | Performed by: FAMILY MEDICINE

## 2017-04-12 PROCEDURE — 99213 OFFICE O/P EST LOW 20 MIN: CPT | Mod: 25 | Performed by: FAMILY MEDICINE

## 2017-04-12 RX ORDER — PREGABALIN 50 MG/1
CAPSULE ORAL
Qty: 120 CAPSULE | Refills: 5 | COMMUNITY
Start: 2017-02-09 | End: 2017-05-22

## 2017-04-12 NOTE — PATIENT INSTRUCTIONS
Thank you for choosing [facility].  You may be receiving a survey in the mail from Ariana Mendiola regarding your visit today.  Please take a few minutes to complete and return the survey to let us know how we are doing.      If you have questions or concerns, please contact us via [employer] or you can contact your care team at [phone].    Our Clinic hours are:  Monday 6:40 am  to 7:00 pm  Tuesday -Friday 6:40 am to 5:00 pm    The [place] outpatient lab hours are:  Monday - Friday 6:10 am to 4:45 pm  Saturdays 7:00 am to 11:00 am  Appointments are required, call [phone]    If you have clinical questions after hours or would like to schedule an appointment,  call the clinic at [phone].    (B07.0) Plantar warts  (primary encounter diagnosis)  Comment:   Plan: DESTRUCT BENIGN LESION, UP TO 14        For the left foot, the lesion on the bottom is a viral wart. We will use the liquid nitrogen for cryotherapy. Starting tomorrow night use the Over the counter liquid wart medication daily for about three weeks.   Look at the bottom of the foot daily and monitor for resolution. Avoid contagious exposures. If not better we can treat this again.     (D23.5) Benign neoplasm of skin of trunk, except scrotum  Comment:   Plan: DERMATOLOGY REFERRAL        We electrocauterized this lesion twice and it has recurred. We discussed the options and will refer to Dermatology. Call [phone] for that appt.

## 2017-04-12 NOTE — NURSING NOTE
"Chief Complaint   Patient presents with     Derm Problem     Here to have a wart/corn on the bottom of the left foot checked.     Medication Reconciliation     He is taking the Lyrica two in the am and two in the pm.  This is due to having to take the Metformin in the midday.       Initial /81  Pulse 73  Temp 96.9  F (36.1  C) (Tympanic)  Ht 5' 10\" (1.778 m)  Wt 249 lb (112.9 kg)  SpO2 95%  BMI 35.73 kg/m2 Estimated body mass index is 35.73 kg/(m^2) as calculated from the following:    Height as of this encounter: 5' 10\" (1.778 m).    Weight as of this encounter: 249 lb (112.9 kg).  Medication Reconciliation: complete  "

## 2017-04-12 NOTE — PROGRESS NOTES
SUBJECTIVE:                                                    Stef Bhatt is a 71 year old male who presents to clinic today for the following health issues:      WART(S)  Possible corn      Onset: Number of months.    Description (location/number): One area on the bottom of the left foot.    Accompanying signs and symptoms: Painful: YES- very painful unless he has a shoe on.    History: prior warts: Previous history of plantars warts.    Therapies tried and outcome: Tried an OTC wart treatment.  Dr. Irvin visit and was told it was a callous.  Patient feels there is something under the area.  He has been wearing a corn pad around the area.     MEDICATION:  He is taking the Lyrica two in the am and two in the pm.  This is due to having to take the Metformin in the midday.      Current Outpatient Prescriptions:      pregabalin (LYRICA) 50 MG capsule, Take 2 in the am, 2 in the pm., Disp: 120 capsule, Rfl: 5     warfarin (COUMADIN) 2 MG tablet, As directed by Anticoagulation Clinic (current dose 2mg MF, 4mg rest of the week), Disp: 150 tablet, Rfl: 3     cefUROXime (CEFTIN) 500 MG tablet, Take 1 tablet (500 mg) by mouth 2 times daily, Disp: 20 tablet, Rfl: 3     losartan (COZAAR) 50 MG tablet, Take 1 tablet (50 mg) by mouth daily, Disp: 90 tablet, Rfl: 3     metoprolol (TOPROL-XL) 50 MG 24 hr tablet, 100 mg in am, 50 mg in pm, Disp: 270 tablet, Rfl: 3     simvastatin (ZOCOR) 20 MG tablet, Take 1 tablet (20 mg) by mouth daily, Disp: 90 tablet, Rfl: 3     glipiZIDE (GLIPIZIDE XL) 10 MG 24 hr tablet, Take 2 in the am, Disp: 180 tablet, Rfl: 3     metFORMIN (GLUCOPHAGE) 500 MG tablet, Take 1 tablet (500 mg) by mouth daily (with dinner), Disp: 90 tablet, Rfl: 3     nitroglycerin (NITROSTAT) 0.4 MG SL tablet, Place 1 tablet (0.4 mg) under the tongue every 5 minutes as needed for chest pain, Disp: 25 tablet, Rfl: 11     ketoconazole (NIZORAL) 2 % shampoo, Apply daily then rinse. Pt will call to order, Disp: 120 mL,  Rfl: 11     ipratropium - albuterol 0.5 mg/2.5 mg/3 mL (DUONEB) 0.5-2.5 (3) MG/3ML nebulization, Take 1 vial by nebulization every 6 hours as needed for shortness of breath / dyspnea or wheezing, Disp: , Rfl:      predniSONE (DELTASONE) 10 MG tablet, Take 10 mg by mouth daily, Disp: , Rfl:      umeclidinium bromide (INCRUSE ELLIPTA) 62.5 MCG/INH inhalation capsule, Inhale 1 capsule (62.5 mcg) into the lungs daily, Disp: , Rfl:      diphenhydrAMINE-acetaminophen (TYLENOL PM EXTRA STRENGTH)  MG tablet, Take 1 tablet by mouth nightly as needed for sleep, Disp: , Rfl:      acetaminophen (TYLENOL) 500 MG tablet, Take 500 mg by mouth every 6 hours as needed for mild pain, Disp: , Rfl:      albuterol (VENTOLIN HFA) 108 (90 BASE) MCG/ACT inhaler, Inhale 2 puffs into the lungs every 4 hours as needed Pt will call to order, Disp: 1 Inhaler, Rfl: 11     blood glucose (ACCU-CHEK COMPACT DRUM) test strip, Use to test blood sugar three times daily or as directed., Disp: 270 strip, Rfl: 1     PSYLLIUM HUSK, , Disp: , Rfl:      guaiFENesin (MUCINEX) 600 MG 12 hr tablet, Take 400-600 mg by mouth 2 times daily , Disp: , Rfl:      glucose blood VI test strips strip, Use as directed to check sugars twice a day, Disp: 3 Month, Rfl: 3     fexofenadine (ALLEGRA) 180 MG tablet, Take 180 mg by mouth every evening , Disp: 30 tablet, Rfl: 1     aspirin 81 MG tablet, Take 81 mg by mouth every evening., Disp: 1 tablet, Rfl: 3     ACCU-CHEK SOFTCLIX LANCETS MISC, use as directed to test blood sugars up to once daily; pt will call to order, Disp: 100 Each, Rfl: 11     [DISCONTINUED] pregabalin (LYRICA) 50 MG capsule, Take 2 in the am, one at noon and one in the pm., Disp: 120 capsule, Rfl: 5     ascorbic acid (VITAMIN C) 1000 MG TABS, Take 1,000 mg by mouth daily In winter only, Disp: , Rfl:     Patient Active Problem List   Diagnosis     Coronary atherosclerosis     Essential hypertension     Hemorrhage of rectum and anus     Allergic  "Rhinitis     AK (actinic keratosis)     HYPERLIPIDEMIA LDL GOAL <100     Ventral hernia     Colon polyp     Dysphonia     Senile nuclear sclerosis     Acute myocardial infarction of other specified sites, episode of care unspecified     Health Care Home     Atrial fibrillation (H)     Advance Care Planning     Edge's neuroma     COPD (chronic obstructive pulmonary disease) (H)     Long term current use of anticoagulant therapy     Hyperlipidemia with target LDL less than 100     Morbid obesity (H)     Type 2 diabetes mellitus with diabetic neuropathy (H)     Peripheral polyneuropathy (H)     Chronic bronchitis, unspecified chronic bronchitis type (H)     DJD (degenerative joint disease), cervical     Benign neoplasm of skin of trunk, except scrotum       Blood pressure 128/81, pulse 73, temperature 96.9  F (36.1  C), temperature source Tympanic, height 5' 10\" (1.778 m), weight 249 lb (112.9 kg), SpO2 95 %.    Exam:  GENERAL APPEARANCE: healthy, alert and no distress  SKIN: on the left foot, plantar area, there is a 22 by 15 mm lesion that appears as a wart.   He has sensation on the bottom of the left foot.   PSYCH: mentation appears normal and affect normal/bright    (B07.0) Plantar warts  (primary encounter diagnosis)  Comment:   Plan: DESTRUCT BENIGN LESION, UP TO 14        For the left foot, the lesion on the bottom is a viral wart. We will use the liquid nitrogen for cryotherapy. Starting tomorrow night use the Over the counter liquid wart medication daily for about three weeks.   Look at the bottom of the foot daily and monitor for resolution. Avoid contagious exposures. If not better we can treat this again.     (D23.5) Benign neoplasm of skin of trunk, except scrotum  Comment:   Plan: DERMATOLOGY REFERRAL        We electrocauterized this lesion twice and it has recurred. We discussed the options and will refer to Dermatology. Call [phone] for that appt.     Marcelino Foster"

## 2017-04-20 ENCOUNTER — TELEPHONE (OUTPATIENT)
Dept: FAMILY MEDICINE | Facility: CLINIC | Age: 72
End: 2017-04-20

## 2017-04-20 NOTE — TELEPHONE ENCOUNTER
Patient is due for A1C.  Last office visit for diabetes was 2/2/17, last a1c was 1/26/17.  Per office visit instructions:    (E11.40) Type 2 diabetes mellitus with diabetic neuropathy, without long-term current use of insulin (H)  Comment:   Plan: cefUROXime (CEFTIN) 500 MG tablet, Hemoglobin   A1c, TSH, T4 FREE, Creatinine, CBC with   platelets differential, Potassium,   The Hgb A1c was high at 8.2%, and the goal is under 8.0% and best under 7.0%. We will reorder the A1c test for 3-6 months. Do this 2 months after any dose of Prednisone.   We should always notify you of the results. MyChart results are available as soon as the doctor sees them. Use the dietary and exercise and weight instructions.   Do the daily foot care. Also see the Vascular clinic at [facility]. See the eye doctor annually.     Left message for patient to return call.

## 2017-04-21 ENCOUNTER — TRANSFERRED RECORDS (OUTPATIENT)
Dept: HEALTH INFORMATION MANAGEMENT | Facility: CLINIC | Age: 72
End: 2017-04-21

## 2017-04-27 NOTE — TELEPHONE ENCOUNTER
Patient has apt 5/16 for  Labs and 5/20 to see Dr. Foster. Will postpone this message til then to see if patient was seen. Jean-Paul NINO CMA

## 2017-05-02 ENCOUNTER — TELEPHONE (OUTPATIENT)
Dept: FAMILY MEDICINE | Facility: CLINIC | Age: 72
End: 2017-05-02

## 2017-05-02 NOTE — LETTER
[facility]  [address] [address]  [phone]      May 2, 2017      Stef Bhatt  [address] [place] [address]      To Whom It May Concern:      Stef is a patient of mine. He is unable to sit for long periods of time and therefore will not be able to serve on jury duty due to a medical condition, peripheral artery disease.         Sincerely,     Marcelino Foster MD

## 2017-05-02 NOTE — TELEPHONE ENCOUNTER
Pt calling again for a letter. I told him that we are waiting for Dr Foster to sign.     He would like to pick that up in the clinic tomorrow late morning, early afternoon.    Rehana Reed  [facility] [place]

## 2017-05-02 NOTE — TELEPHONE ENCOUNTER
Patient is calling, as he just received a notice of Jury Duty, and states in 2009 he was diagnosed with PAD, now has COPD, and A -Fib, he states Dr. Foster has written a letter for him to be excused before. Please call and advise patient when this is ready for .  He would like this as soon as possible.    Zari Salazar  [facility]  [place]

## 2017-05-03 NOTE — TELEPHONE ENCOUNTER
Letter is signed. Please help Stef get this, or fax it to the required place.   Thanks.   Marcelino Foster

## 2017-05-16 DIAGNOSIS — J43.1 PANLOBULAR EMPHYSEMA (H): ICD-10-CM

## 2017-05-16 DIAGNOSIS — E11.40 TYPE 2 DIABETES MELLITUS WITH DIABETIC NEUROPATHY, WITHOUT LONG-TERM CURRENT USE OF INSULIN (H): ICD-10-CM

## 2017-05-16 DIAGNOSIS — J42 CHRONIC BRONCHITIS, UNSPECIFIED CHRONIC BRONCHITIS TYPE (H): ICD-10-CM

## 2017-05-16 LAB
BASOPHILS # BLD AUTO: 0 10E9/L (ref 0–0.2)
BASOPHILS NFR BLD AUTO: 0.6 %
CREAT SERPL-MCNC: 1.1 MG/DL (ref 0.66–1.25)
DIFFERENTIAL METHOD BLD: NORMAL
EOSINOPHIL # BLD AUTO: 0.1 10E9/L (ref 0–0.7)
EOSINOPHIL NFR BLD AUTO: 2 %
ERYTHROCYTE [DISTWIDTH] IN BLOOD BY AUTOMATED COUNT: 14.5 % (ref 10–15)
GFR SERPL CREATININE-BSD FRML MDRD: 66 ML/MIN/1.7M2
HBA1C MFR BLD: 7.2 % (ref 4.3–6)
HCT VFR BLD AUTO: 47.5 % (ref 40–53)
HGB BLD-MCNC: 15.7 G/DL (ref 13.3–17.7)
LYMPHOCYTES # BLD AUTO: 1.4 10E9/L (ref 0.8–5.3)
LYMPHOCYTES NFR BLD AUTO: 19.1 %
MCH RBC QN AUTO: 32.1 PG (ref 26.5–33)
MCHC RBC AUTO-ENTMCNC: 33.1 G/DL (ref 31.5–36.5)
MCV RBC AUTO: 97 FL (ref 78–100)
MONOCYTES # BLD AUTO: 0.9 10E9/L (ref 0–1.3)
MONOCYTES NFR BLD AUTO: 13 %
NEUTROPHILS # BLD AUTO: 4.6 10E9/L (ref 1.6–8.3)
NEUTROPHILS NFR BLD AUTO: 65.3 %
PLATELET # BLD AUTO: 161 10E9/L (ref 150–450)
POTASSIUM SERPL-SCNC: 4.2 MMOL/L (ref 3.4–5.3)
RBC # BLD AUTO: 4.89 10E12/L (ref 4.4–5.9)
T4 FREE SERPL-MCNC: 1.28 NG/DL (ref 0.76–1.46)
TSH SERPL DL<=0.05 MIU/L-ACNC: 3.02 MU/L (ref 0.4–4)
WBC # BLD AUTO: 7.1 10E9/L (ref 4–11)

## 2017-05-16 PROCEDURE — 84443 ASSAY THYROID STIM HORMONE: CPT | Performed by: FAMILY MEDICINE

## 2017-05-16 PROCEDURE — 84132 ASSAY OF SERUM POTASSIUM: CPT | Performed by: FAMILY MEDICINE

## 2017-05-16 PROCEDURE — 82565 ASSAY OF CREATININE: CPT | Performed by: FAMILY MEDICINE

## 2017-05-16 PROCEDURE — 83036 HEMOGLOBIN GLYCOSYLATED A1C: CPT | Performed by: FAMILY MEDICINE

## 2017-05-16 PROCEDURE — 84439 ASSAY OF FREE THYROXINE: CPT | Performed by: FAMILY MEDICINE

## 2017-05-16 PROCEDURE — 85025 COMPLETE CBC W/AUTO DIFF WBC: CPT | Performed by: FAMILY MEDICINE

## 2017-05-16 PROCEDURE — 36415 COLL VENOUS BLD VENIPUNCTURE: CPT | Performed by: FAMILY MEDICINE

## 2017-05-18 ENCOUNTER — TRANSFERRED RECORDS (OUTPATIENT)
Dept: HEALTH INFORMATION MANAGEMENT | Facility: CLINIC | Age: 72
End: 2017-05-18

## 2017-05-22 ENCOUNTER — OFFICE VISIT (OUTPATIENT)
Dept: FAMILY MEDICINE | Facility: CLINIC | Age: 72
End: 2017-05-22
Payer: MEDICARE

## 2017-05-22 VITALS
OXYGEN SATURATION: 96 % | TEMPERATURE: 98 F | BODY MASS INDEX: 36.22 KG/M2 | WEIGHT: 253 LBS | DIASTOLIC BLOOD PRESSURE: 80 MMHG | HEIGHT: 70 IN | HEART RATE: 74 BPM | SYSTOLIC BLOOD PRESSURE: 125 MMHG | RESPIRATION RATE: 20 BRPM

## 2017-05-22 DIAGNOSIS — Z12.11 SPECIAL SCREENING FOR MALIGNANT NEOPLASMS, COLON: Primary | ICD-10-CM

## 2017-05-22 DIAGNOSIS — E11.9 TYPE 2 DIABETES MELLITUS WITHOUT COMPLICATION, WITHOUT LONG-TERM CURRENT USE OF INSULIN (H): ICD-10-CM

## 2017-05-22 DIAGNOSIS — E78.5 HYPERLIPIDEMIA WITH TARGET LDL LESS THAN 100: ICD-10-CM

## 2017-05-22 DIAGNOSIS — I25.701: ICD-10-CM

## 2017-05-22 DIAGNOSIS — G62.9 PERIPHERAL POLYNEUROPATHY: ICD-10-CM

## 2017-05-22 DIAGNOSIS — I10 ESSENTIAL HYPERTENSION WITH GOAL BLOOD PRESSURE LESS THAN 130/80: ICD-10-CM

## 2017-05-22 PROCEDURE — 99214 OFFICE O/P EST MOD 30 MIN: CPT | Performed by: FAMILY MEDICINE

## 2017-05-22 RX ORDER — NITROGLYCERIN 0.4 MG/1
0.4 TABLET SUBLINGUAL EVERY 5 MIN PRN
Qty: 25 TABLET | Refills: 11 | Status: SHIPPED | OUTPATIENT
Start: 2017-05-22 | End: 2017-09-14

## 2017-05-22 RX ORDER — GLIPIZIDE 10 MG/1
TABLET, FILM COATED, EXTENDED RELEASE ORAL
Qty: 180 TABLET | Refills: 3 | Status: SHIPPED | OUTPATIENT
Start: 2017-05-22 | End: 2018-05-03

## 2017-05-22 RX ORDER — SIMVASTATIN 20 MG
20 TABLET ORAL DAILY
Qty: 90 TABLET | Refills: 3 | Status: SHIPPED | OUTPATIENT
Start: 2017-05-22 | End: 2018-05-03

## 2017-05-22 RX ORDER — LOSARTAN POTASSIUM 50 MG/1
50 TABLET ORAL DAILY
Qty: 90 TABLET | Refills: 3 | Status: SHIPPED | OUTPATIENT
Start: 2017-05-22 | End: 2018-05-03

## 2017-05-22 RX ORDER — METOPROLOL SUCCINATE 50 MG/1
TABLET, EXTENDED RELEASE ORAL
Qty: 270 TABLET | Refills: 3 | Status: SHIPPED | OUTPATIENT
Start: 2017-05-22 | End: 2018-05-03

## 2017-05-22 RX ORDER — PREGABALIN 50 MG/1
CAPSULE ORAL
Qty: 120 CAPSULE | Refills: 5 | Status: SHIPPED | OUTPATIENT
Start: 2017-05-22 | End: 2017-09-14

## 2017-05-22 NOTE — MR AVS SNAPSHOT
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 Refill and recheck in six months if doing well.     (E78.5) Hyperlipidemia with target LDL less than 100  Comment:   Plan: simvastatin (ZOCOR) 20 MG tablet        Use the lower chol diet and daily exercise and refill and recheck in six months. Do the fasting labs before the appt.     (E11.9) Type 2 diabetes mellitus without complication, without long-term current use of insulin (H)  Comment:   Plan: glipiZIDE (GLIPIZIDE XL) 10 MG 24 hr tablet,         metFORMIN (GLUCOPHAGE) 500 MG tablet, **A1C         FUTURE anytime, **Lipid panel reflex to direct         LDL FUTURE anytime, **Albumin Random Urine         Quant FUTURE anytime        Use the lower carb diet and daily exercise. Do the daily foot care and see the eye doctor annually.     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 Use the risk factor reduction as discussed.     (G62.9) Peripheral polyneuropathy (H)  Comment:   Plan: pregabalin (LYRICA) 50 MG capsule        The med is refilled for six months. If the symptoms are stable then follow up a that time.         Follow-ups after your visit        Your next 10 appointments already scheduled     Jun 01, 2017 11:00 AM CDT   [facility] [facility]    [address] [address]   [phone]              Future tests that were ordered for you today     Open Future Orders        Priority Expected Expires Ordered    **A1C FUTURE anytime Routine 5/22/2017 5/22/2018 5/22/2017    **Lipid panel reflex to direct LDL FUTURE anytime Routine 5/22/2017 5/22/2018 5/22/2017    **Albumin Random Urine Quant FUTURE anytime Routine 5/22/2017 5/22/2018 5/22/2017            Who to contact     If you have questions or need follow up information about today's clinic visit or your schedule please contact [facility] directly at [phone].  Normal or non-critical lab and imaging results will be communicated to you by FanSn[employer]art, letter or phone within 4 business days after the clinic has received the results. If you do not hear from us within 7 days, please contact the clinic through FanSnaphart or phone. If you have a critical or abnormal lab result, we will notify you by phone as soon as possible.  Submit refill requests through [employer] or call your pharmacy and they will forward the refill request to us. Please allow 3 business days for your refill to be completed.          Additional Information About Your Visit        [employer] Information     [employer] lets you send messages to your doctor, view your test results, renew your prescriptions, schedule appointments and more.  "To sign up, go to [email or web address] . Click on \"Log in\" on the left side of the screen, which will take you to the Welcome page. Then click on \"Sign up Now\" on the right side of the page.     You will be asked to enter the access code listed below, as well as some personal information. Please follow the directions to create your username and password.     Your access code is: [email or web address]  Expires: 2017 11:48 AM     Your access code will  in 90 days. If you need help or a new code, please call your [place] clinic or [phone].        Care EveryWhere ID     This is your Care EveryWhere ID. This could be used by other organizations to access your [place] medical records  [id number]        Your Vitals Were     Pulse Temperature Respirations Height Pulse Oximetry BMI (Body Mass Index)    74 98  F (36.7  C) (Tympanic) 20 5' 10\" (1.778 m) 96% 36.3 kg/m2       Blood Pressure from Last 3 Encounters:   17 125/80   17 128/81   17 137/80    Weight from Last 3 Encounters:   17 253 lb (114.8 kg)   17 249 lb (112.9 kg)   17 251 lb (113.9 kg)                 Where to get your medicines      These medications were sent to [address] [address]     Phone:  [phone]     glipiZIDE 10 MG 24 hr tablet    losartan 50 MG tablet    metFORMIN 500 MG tablet    metoprolol 50 MG 24 hr tablet    nitroglycerin 0.4 MG sublingual tablet    simvastatin 20 MG tablet         Some of these will need a paper prescription and others can be bought over the counter.  Ask your nurse if you have questions.     Bring a paper prescription for each of these medications     pregabalin 50 MG capsule          Primary Care Provider Office Phone # Fax #    Marcelino Foster [phone]       [facility] [address] [address]        Thank you!     Thank you for choosing [facility]  for your care. Our goal is always to provide you with excellent care. Hearing back from our patients is one way we can continue to improve our services. Please take a few minutes to complete the written survey that you may receive in the mail after your visit with us. Thank you!             Your Updated Medication List - Protect others around you: Learn how to safely use, store and throw away your medicines at www.disposemymeds.org.          This list is accurate as of: 5/22/17 12:13 PM.  Always use your most recent med list.                   Brand Name Dispense Instructions for use    albuterol 108 (90 BASE) MCG/ACT Inhaler    VENTOLIN HFA    1 Inhaler    Inhale 2 puffs into the lungs every 4 hours as needed Pt will call to order       ALLEGRA 180 MG tablet   Generic drug:  fexofenadine     30 tablet    Take 180 mg by mouth every evening       ascorbic acid 1000 MG Tabs    vitamin C     Take 1,000 mg by mouth daily In winter only       aspirin 81 MG tablet     1 tablet    Take 81 mg by mouth every evening.       blood glucose monitoring lancets     100 Each    use as directed to test blood sugars up to once daily; pt will call to order       * blood glucose monitoring test strip    no brand specified    3 Month    Use as directed to check sugars twice a day       * blood glucose monitoring test strip    ACCU-CHEK COMPACT DRUM    270 strip    Use to test blood sugar three times daily or as directed.       cefuroxime 500 MG tablet    CEFTIN    20 tablet    Take 1 tablet (500 mg) by mouth 2 times daily       glipiZIDE 10 MG 24 hr tablet    glipiZIDE XL    180 tablet    Take 2 in the am       ipratropium - albuterol 0.5 mg/2.5 mg/3 mL 0.5-2.5 (3) MG/3ML neb solution    DUONEB     Take 1 vial by nebulization every 6 hours as needed for shortness of breath / dyspnea or wheezing       ketoconazole 2 % shampoo    NIZORAL    120 mL    Apply daily then rinse. Pt will call to order       losartan 50 MG  tablet    COZAAR    90 tablet    Take 1 tablet (50 mg) by mouth daily       metFORMIN 500 MG tablet    GLUCOPHAGE    90 tablet    Take 1 tablet (500 mg) by mouth daily (with dinner)       metoprolol 50 MG 24 hr tablet    TOPROL-XL    270 tablet    100 mg in am, 50 mg in pm       MUCINEX 600 MG 12 hr tablet   Generic drug:  guaiFENesin      Take 400-600 mg by mouth 2 times daily       nitroglycerin 0.4 MG sublingual tablet    NITROSTAT    25 tablet    Place 1 tablet (0.4 mg) under the tongue every 5 minutes as needed for chest pain       predniSONE 10 MG tablet    DELTASONE     Take 10 mg by mouth daily       pregabalin 50 MG capsule    LYRICA    120 capsule    Take 2 in the am, 2 in the pm.       PSYLLIUM HUSK          simvastatin 20 MG tablet    ZOCOR    90 tablet    Take 1 tablet (20 mg) by mouth daily       TYLENOL 500 MG tablet   Generic drug:  acetaminophen      Take 500 mg by mouth every 6 hours as needed for mild pain       TYLENOL PM EXTRA STRENGTH  MG tablet   Generic drug:  diphenhydrAMINE-acetaminophen      Take 1 tablet by mouth nightly as needed for sleep       umeclidinium-vilanterol 62.5-25 MCG/INH oral inhaler    ANORO ELLIPTA     Inhale 1 puff into the lungs daily       warfarin 2 MG tablet    COUMADIN    150 tablet    As directed by Anticoagulation Clinic (current dose 2mg MF, 4mg rest of the week)       * Notice:  This list has 2 medication(s) that are the same as other medications prescribed for you. Read the directions carefully, and ask your doctor or other care provider to review them with you.

## 2017-05-22 NOTE — PATIENT INSTRUCTIONS
(I10) Essential hypertension with goal blood pressure less than 130/80  Comment:   Plan: losartan (COZAAR) 50 MG tablet, metoprolol         (TOPROL-XL) 50 MG 24 hr tablet        Monitor and record the BP and the goal for the average is under 130/80. Refill and recheck in six months if doing well.     (E78.5) Hyperlipidemia with target LDL less than 100  Comment:   Plan: simvastatin (ZOCOR) 20 MG tablet        Use the lower chol diet and daily exercise and refill and recheck in six months. Do the fasting labs before the appt.     (E11.9) Type 2 diabetes mellitus without complication, without long-term current use of insulin (H)  Comment:   Plan: glipiZIDE (GLIPIZIDE XL) 10 MG 24 hr tablet,         metFORMIN (GLUCOPHAGE) 500 MG tablet, **A1C         FUTURE anytime, **Lipid panel reflex to direct         LDL FUTURE anytime, **Albumin Random Urine         Quant FUTURE anytime        Use the lower carb diet and daily exercise. Do the daily foot care and see the eye doctor annually.     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 Use the risk factor reduction as discussed.     (G62.9) Peripheral polyneuropathy (H)  Comment:   Plan: pregabalin (LYRICA) 50 MG capsule        The med is refilled for six months. If the symptoms are stable then follow up a that time.

## 2017-05-22 NOTE — PROGRESS NOTES
SUBJECTIVE:                                                    Stef Bhatt is a 71 year old male who presents to clinic today for the following health issues:    Pt had labs drawn 5/16/2017    Diabetes Follow-up    Patient is checking blood sugars: once daily.  Results are as follows:         am - 130-140    After supper- 160-170    Diabetic concerns: None and frequent infections     Symptoms of hypoglycemia (low blood sugar): none     Paresthesias (numbness or burning in feet) or sores: Yes neuropathy      Date of last diabetic eye exam: 04/2017     Hyperlipidemia Follow-Up      Rate your low fat/cholesterol diet?: fair    Taking statin?  Yes, possible muscle aches from statin    Other lipid medications/supplements?:  none     Hypertension Follow-up      Outpatient blood pressures are being checked at home.  Results are 110-130/70-86.    Low Salt Diet: no added salt         Amount of exercise or physical activity: None    Problems taking medications regularly: No    Medication side effects: none    Diet: regular (no restrictions)        Current Outpatient Prescriptions:      umeclidinium-vilanterol (ANORO ELLIPTA) 62.5-25 MCG/INH oral inhaler, Inhale 1 puff into the lungs daily, Disp: , Rfl:      pregabalin (LYRICA) 50 MG capsule, Take 2 in the am, 2 in the pm., Disp: 120 capsule, Rfl: 5     warfarin (COUMADIN) 2 MG tablet, As directed by Anticoagulation Clinic (current dose 2mg MF, 4mg rest of the week), Disp: 150 tablet, Rfl: 3     cefUROXime (CEFTIN) 500 MG tablet, Take 1 tablet (500 mg) by mouth 2 times daily, Disp: 20 tablet, Rfl: 3     losartan (COZAAR) 50 MG tablet, Take 1 tablet (50 mg) by mouth daily, Disp: 90 tablet, Rfl: 3     metoprolol (TOPROL-XL) 50 MG 24 hr tablet, 100 mg in am, 50 mg in pm, Disp: 270 tablet, Rfl: 3     simvastatin (ZOCOR) 20 MG tablet, Take 1 tablet (20 mg) by mouth daily, Disp: 90 tablet, Rfl: 3     glipiZIDE (GLIPIZIDE XL) 10 MG 24 hr tablet, Take 2 in the am, Disp: 180  tablet, Rfl: 3     metFORMIN (GLUCOPHAGE) 500 MG tablet, Take 1 tablet (500 mg) by mouth daily (with dinner), Disp: 90 tablet, Rfl: 3     nitroglycerin (NITROSTAT) 0.4 MG SL tablet, Place 1 tablet (0.4 mg) under the tongue every 5 minutes as needed for chest pain, Disp: 25 tablet, Rfl: 11     ketoconazole (NIZORAL) 2 % shampoo, Apply daily then rinse. Pt will call to order, Disp: 120 mL, Rfl: 11     ipratropium - albuterol 0.5 mg/2.5 mg/3 mL (DUONEB) 0.5-2.5 (3) MG/3ML nebulization, Take 1 vial by nebulization every 6 hours as needed for shortness of breath / dyspnea or wheezing, Disp: , Rfl:      predniSONE (DELTASONE) 10 MG tablet, Take 10 mg by mouth daily, Disp: , Rfl:      diphenhydrAMINE-acetaminophen (TYLENOL PM EXTRA STRENGTH)  MG tablet, Take 1 tablet by mouth nightly as needed for sleep, Disp: , Rfl:      acetaminophen (TYLENOL) 500 MG tablet, Take 500 mg by mouth every 6 hours as needed for mild pain, Disp: , Rfl:      albuterol (VENTOLIN HFA) 108 (90 BASE) MCG/ACT inhaler, Inhale 2 puffs into the lungs every 4 hours as needed Pt will call to order, Disp: 1 Inhaler, Rfl: 11     blood glucose (ACCU-CHEK COMPACT DRUM) test strip, Use to test blood sugar three times daily or as directed., Disp: 270 strip, Rfl: 1     ascorbic acid (VITAMIN C) 1000 MG TABS, Take 1,000 mg by mouth daily In winter only, Disp: , Rfl:      PSYLLIUM HUSK, , Disp: , Rfl:      guaiFENesin (MUCINEX) 600 MG 12 hr tablet, Take 400-600 mg by mouth 2 times daily , Disp: , Rfl:      glucose blood VI test strips strip, Use as directed to check sugars twice a day, Disp: 3 Month, Rfl: 3     fexofenadine (ALLEGRA) 180 MG tablet, Take 180 mg by mouth every evening , Disp: 30 tablet, Rfl: 1     aspirin 81 MG tablet, Take 81 mg by mouth every evening., Disp: 1 tablet, Rfl: 3     ACCU-CHEK SOFTCLIX LANCETS MISC, use as directed to test blood sugars up to once daily; pt will call to order, Disp: 100 Each, Rfl: 11    Patient Active Problem  "List   Diagnosis     Coronary atherosclerosis     Essential hypertension     Hemorrhage of rectum and anus     Allergic Rhinitis     AK (actinic keratosis)     HYPERLIPIDEMIA LDL GOAL <100     Ventral hernia     Colon polyp     Dysphonia     Senile nuclear sclerosis     Acute myocardial infarction of other specified sites, episode of care unspecified     Health Care Home     Atrial fibrillation (H)     Advance Care Planning     Edge's neuroma     COPD (chronic obstructive pulmonary disease) (H)     Long term current use of anticoagulant therapy     Hyperlipidemia with target LDL less than 100     Morbid obesity (H)     Type 2 diabetes mellitus with diabetic neuropathy (H)     Peripheral polyneuropathy (H)     Chronic bronchitis, unspecified chronic bronchitis type (H)     DJD (degenerative joint disease), cervical     Benign neoplasm of skin of trunk, except scrotum       Blood pressure 125/80, pulse 74, temperature 98  F (36.7  C), temperature source Tympanic, resp. rate 20, height 5' 10\" (1.778 m), weight 253 lb (114.8 kg), SpO2 96 %.    Exam:  GENERAL APPEARANCE: healthy, alert and no distress  EYES: EOMI,  PERRL  RESP: lungs clear to auscultation - no rales, rhonchi or wheezes  CV: regular rates and rhythm, normal S1 S2, no S3 or S4 and no murmur, click or rub -  SKIN: no suspicious lesions or rashes  NEURO: sensory deficit with numbness of the plantar aspect of the left foot.   PSYCH: mentation appears normal and affect normal/bright      (I10) Essential hypertension with goal blood pressure less than 130/80  Comment:   Plan: losartan (COZAAR) 50 MG tablet, metoprolol         (TOPROL-XL) 50 MG 24 hr tablet        Monitor and record the BP and the goal for the average is under 130/80. Refill and recheck in six months if doing well.     (E78.5) Hyperlipidemia with target LDL less than 100  Comment:   Plan: simvastatin (ZOCOR) 20 MG tablet        Use the lower chol diet and daily exercise and refill and recheck " in six months. Do the fasting labs before the appt.     (E11.9) Type 2 diabetes mellitus without complication, without long-term current use of insulin (H)  Comment:   Plan: glipiZIDE (GLIPIZIDE XL) 10 MG 24 hr tablet,         metFORMIN (GLUCOPHAGE) 500 MG tablet, **A1C         FUTURE anytime, **Lipid panel reflex to direct         LDL FUTURE anytime, **Albumin Random Urine         Quant FUTURE anytime        Use the lower carb diet and daily exercise. Do the daily foot care and see the eye doctor annually.     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 Use the risk factor reduction as discussed.     (G62.9) Peripheral polyneuropathy (H)  Comment:   Plan: pregabalin (LYRICA) 50 MG capsule        The med is refilled for six months. If the symptoms are stable then follow up a that time.     Marcelino Foster

## 2017-05-22 NOTE — NURSING NOTE
"Chief Complaint   Patient presents with     Hypertension     Diabetes       Initial /80  Pulse 74  Temp 98  F (36.7  C) (Tympanic)  Resp 20  Ht 5' 10\" (1.778 m)  Wt 253 lb (114.8 kg)  SpO2 96%  BMI 36.3 kg/m2 Estimated body mass index is 36.3 kg/(m^2) as calculated from the following:    Height as of this encounter: 5' 10\" (1.778 m).    Weight as of this encounter: 253 lb (114.8 kg).  Medication Reconciliation: complete    "

## 2017-06-01 ENCOUNTER — ANTICOAGULATION THERAPY VISIT (OUTPATIENT)
Dept: ANTICOAGULATION | Facility: CLINIC | Age: 72
End: 2017-06-01
Payer: MEDICARE

## 2017-06-01 DIAGNOSIS — I48.91 ATRIAL FIBRILLATION (H): ICD-10-CM

## 2017-06-01 DIAGNOSIS — Z79.01 LONG TERM CURRENT USE OF ANTICOAGULANT THERAPY: ICD-10-CM

## 2017-06-01 LAB — INR POINT OF CARE: 3.3 (ref 0.86–1.14)

## 2017-06-01 PROCEDURE — 36416 COLLJ CAPILLARY BLOOD SPEC: CPT

## 2017-06-01 PROCEDURE — 99207 ZZC NO CHARGE NURSE ONLY: CPT

## 2017-06-01 PROCEDURE — 85610 PROTHROMBIN TIME: CPT | Mod: QW

## 2017-06-01 NOTE — PROGRESS NOTES
"  ANTICOAGULATION FOLLOW-UP CLINIC VISIT    Patient Name:  Stef Bhatt  Date:  6/1/2017  Contact Type:  Face to Face    SUBJECTIVE:     Patient Findings     Positives Change in medications (Patient will be starting prednisone tonight for trouble breathing (12 day course)), Blood in stool (Patient has a hemorrhoid that was bleeding due to constipation - he reports it stops on its own within a short time)    Comments Patient states he usually has salads twice per week and has about 2 drinks per week to \"balance\" it out. He will avoid alcohol while taking prednisone. Patient will have an extra serving or 2 of salads this next week to ensure his INR does not increase.            OBJECTIVE    INR Protime   Date Value Ref Range Status   06/01/2017 3.3 (A) 0.86 - 1.14 Final       ASSESSMENT / PLAN  INR assessment SUPRA    Recheck INR In: 1 WEEK    INR Location Clinic      Anticoagulation Summary as of 6/1/2017     INR goal 2.0-3.0   Today's INR 3.3!   Maintenance plan 2 mg (2 mg x 1) on Mon, Fri; 4 mg (2 mg x 2) all other days   Full instructions 6/3: 2 mg; Otherwise 2 mg on Mon, Fri; 4 mg all other days   Weekly total 24 mg   Plan last modified Noelle Harrington RN (4/6/2015)   Next INR check 6/8/2017   Priority INR   Target end date Indefinite    Indications   Atrial fibrillation (H) [I48.91]  Long term current use of anticoagulant therapy [Z79.01]         Anticoagulation Episode Summary     INR check location     Preferred lab     Send INR reminders to [facility]    Comments * warfarin 2 mg tabs. Dr Shelley Lennox is pulmonologist.       Anticoagulation Care Providers     Provider Role Specialty Phone number    Marcelino Foster MD [facility] [phone]            See the Encounter Report to view Anticoagulation Flowsheet and Dosing Calendar (Go to Encounters tab in chart review, and find the Anticoagulation Therapy Visit)        Noelle Harrington RN               "

## 2017-06-01 NOTE — MR AVS SNAPSHOT
Stef Bhatt   6/1/2017 11:00 AM   Anticoagulation Therapy Visit    Description:  71 year old male   Provider:  [facility]   Department:  [facility]           INR as of 6/1/2017     Today's INR 3.3!      Anticoagulation Summary as of 6/1/2017     INR goal 2.0-3.0   Today's INR 3.3!   Full instructions 6/3: 2 mg; Otherwise 2 mg on Mon, Fri; 4 mg all other days   Next INR check 6/8/2017    Indications   Atrial fibrillation (H) [I48.91]  Long term current use of anticoagulant therapy [Z79.01]         Your next Anticoagulation Clinic appointment(s)     Jun 08, 2017 11:15 AM CDT   [facility]agulation Visit with [facility]   [facility]    [address] [address] [phone]              Contact Numbers     Please call [phone] to cancel and/or reschedule your appointment.  Please call [phone] with any problems or questions regarding your therapy          June 2017 Details    Sun Mon Tue Wed Thu Fri Sat         1      4 mg   See details      2      2 mg         3      2 mg           4      4 mg         5      2 mg         6      4 mg         7      4 mg         8            9               10                 11               12               13               14               15               16               17                 18               19               20               21               22               23               24                 25               26               27               28               29               30                 Date Details   06/01 This INR check       Date of next INR:  6/8/2017         How to take your warfarin dose     To take:  2 mg Take 1 of the 2 mg tablets.    To take:  4 mg Take 2 of the 2 mg tablets.

## 2017-06-05 ENCOUNTER — TELEPHONE (OUTPATIENT)
Dept: FAMILY MEDICINE | Facility: CLINIC | Age: 72
End: 2017-06-05

## 2017-06-05 NOTE — TELEPHONE ENCOUNTER
I called [facility] and spoke with the pharmacist.  Nitrostat is no longer being made however these two products are essentially the same.  Pharmacist says he will change to the other with nothing further needed when pt refills.  Pt informed.  Pt's prescription is written for both generic or brand name.  Donna Reddy RN

## 2017-06-05 NOTE — TELEPHONE ENCOUNTER
Reason for Call:  Other med request    Detailed comments: Patient states that he got notification from his insurance company that as of July 1,2017 Nitrostat SL tabs will no longer be formulary.  They are changing to Nitroglycerin SL tabs.  He uses [facility].    Phone Number Patient can be reached at: Home number on file [phone] (home)    Best Time: any    Can we leave a detailed message on this number? YES    Call taken on 6/5/2017 at 11:27 AM by Katiana Fierro

## 2017-06-08 ENCOUNTER — ANTICOAGULATION THERAPY VISIT (OUTPATIENT)
Dept: ANTICOAGULATION | Facility: CLINIC | Age: 72
End: 2017-06-08
Payer: MEDICARE

## 2017-06-08 DIAGNOSIS — I48.91 ATRIAL FIBRILLATION (H): ICD-10-CM

## 2017-06-08 DIAGNOSIS — Z79.01 LONG TERM CURRENT USE OF ANTICOAGULANT THERAPY: ICD-10-CM

## 2017-06-08 LAB — INR POINT OF CARE: 2.8 (ref 0.86–1.14)

## 2017-06-08 PROCEDURE — 36416 COLLJ CAPILLARY BLOOD SPEC: CPT

## 2017-06-08 PROCEDURE — 99207 ZZC NO CHARGE NURSE ONLY: CPT

## 2017-06-08 PROCEDURE — 85610 PROTHROMBIN TIME: CPT | Mod: QW

## 2017-06-08 NOTE — MR AVS SNAPSHOT
Stef Bhatt   6/8/2017 11:15 AM   Anticoagulation Therapy Visit    Description:  71 year old male   Provider:  [facility]   Department:  [facility]           INR as of 6/8/2017     Today's INR 2.8      Anticoagulation Summary as of 6/8/2017     INR goal 2.0-3.0   Today's INR 2.8   Full instructions 6/10: 2 mg; Otherwise 2 mg on Mon, Fri; 4 mg all other days   Next INR check 8/3/2017    Indications   Atrial fibrillation (H) [I48.91]  Long term current use of anticoagulant therapy [Z79.01]         Description     Please come back sooner if you continue to have any problems with bleeding.      Your next Anticoagulation Clinic appointment(s)     Aug 03, 2017 11:15 AM CDT   [facility]ticoagulation Visit with [facility]   [facility]    [address] [address] [phone]              Contact Numbers     Please call [phone] to cancel and/or reschedule your appointment.  Please call [phone] with any problems or questions regarding your therapy          June 2017 Details    Sun Mon Tue Wed Thu Fri Sat         1               2               3                 4               5               6               7               8      4 mg   See details      9      2 mg         10      2 mg           11      4 mg         12      2 mg         13      4 mg         14      4 mg         15      4 mg         16      2 mg         17      4 mg           18      4 mg         19      2 mg         20      4 mg         21      4 mg         22      4 mg         23      2 mg         24      4 mg           25      4 mg         26      2 mg         27      4 mg         28      4 mg         29      4 mg         30      2 mg           Date Details   06/08 This INR check               How to take your warfarin dose     To take:  2 mg Take 1 of the 2 mg tablets.    To take:  4 mg Take 2 of the 2 mg tablets.           July 2017 Details    Sun Mon Tue Wed Thu Fri Sat            1      4 mg           2      4 mg         3      2 mg         4      4 mg         5      4 mg         6      4 mg         7      2 mg         8      4 mg           9      4 mg         10      2 mg         11      4 mg         12      4 mg         13      4 mg         14      2 mg         15      4 mg           16      4 mg         17      2 mg         18      4 mg         19      4 mg         20      4 mg         21      2 mg         22      4 mg           23      4 mg         24      2 mg         25      4 mg         26      4 mg         27      4 mg         28      2 mg         29      4 mg           30      4 mg         31      2 mg               Date Details   No additional details            How to take your warfarin dose     To take:  2 mg Take 1 of the 2 mg tablets.    To take:  4 mg Take 2 of the 2 mg tablets.           August 2017 Details    Sun Mon Tue Wed Thu Fri Sat       1      4 mg         2      4 mg         3            4               5                 6               7               8               9               10               11               12                 13               14               15               16               17               18               19                 20               21               22               23               24               25               26                 27               28               29               30               31                  Date Details   No additional details    Date of next INR:  8/3/2017         How to take your warfarin dose     To take:  4 mg Take 2 of the 2 mg tablets.

## 2017-06-08 NOTE — PROGRESS NOTES
ANTICOAGULATION FOLLOW-UP CLINIC VISIT    Patient Name:  Stef Bhatt  Date:  6/8/2017  Contact Type:  Face to Face    SUBJECTIVE:     Patient Findings     Positives Change in medications (Patient has 3 more days of prednisone), Blood in stool (Patient continues to have some blood in his stool from his hemorrhoids- he will return to [facility] if it continues (at this point it is getting better))    Comments Patient will take a decreased dose again this Saturday due to hemorrhoids. Patient prefers to come back in 9 weeks, writer requested 8 weeks.           OBJECTIVE    INR Protime   Date Value Ref Range Status   06/08/2017 2.8 (A) 0.86 - 1.14 Final       ASSESSMENT / PLAN  INR assessment THER    Recheck INR In: 8 WEEKS    INR Location Clinic      Anticoagulation Summary as of 6/8/2017     INR goal 2.0-3.0   Today's INR 2.8   Maintenance plan 2 mg (2 mg x 1) on Mon, Fri; 4 mg (2 mg x 2) all other days   Full instructions 6/10: 2 mg; Otherwise 2 mg on Mon, Fri; 4 mg all other days   Weekly total 24 mg   Plan last modified Noelle Harrington RN (4/6/2015)   Next INR check 8/3/2017   Priority INR   Target end date Indefinite    Indications   Atrial fibrillation (H) [I48.91]  Long term current use of anticoagulant therapy [Z79.01]         Anticoagulation Episode Summary     INR check location     Preferred lab     Send INR reminders to [facility]    Comments * warfarin 2 mg tabs. Dr Shelley Lennox is pulmonologist.       Anticoagulation Care Providers     Provider Role Specialty Phone number    Marcelino Foster MD [facility] [phone]            See the Encounter Report to view Anticoagulation Flowsheet and Dosing Calendar (Go to Encounters tab in chart review, and find the Anticoagulation Therapy Visit)        Noelle Harrington RN

## 2017-07-19 ENCOUNTER — OFFICE VISIT (OUTPATIENT)
Dept: FAMILY MEDICINE | Facility: CLINIC | Age: 72
End: 2017-07-19
Payer: MEDICARE

## 2017-07-19 VITALS
SYSTOLIC BLOOD PRESSURE: 112 MMHG | BODY MASS INDEX: 35.36 KG/M2 | OXYGEN SATURATION: 97 % | DIASTOLIC BLOOD PRESSURE: 73 MMHG | HEART RATE: 71 BPM | WEIGHT: 247 LBS | HEIGHT: 70 IN | TEMPERATURE: 97 F

## 2017-07-19 DIAGNOSIS — B07.0 PLANTAR WARTS: Primary | ICD-10-CM

## 2017-07-19 PROCEDURE — 17110 DESTRUCTION B9 LES UP TO 14: CPT | Performed by: FAMILY MEDICINE

## 2017-07-19 NOTE — NURSING NOTE
"Chief Complaint   Patient presents with     Musculoskeletal Problem       Initial /73  Pulse 71  Temp 97  F (36.1  C) (Tympanic)  Ht 5' 10\" (1.778 m)  Wt 247 lb (112 kg)  SpO2 97%  BMI 35.44 kg/m2 Estimated body mass index is 35.44 kg/(m^2) as calculated from the following:    Height as of this encounter: 5' 10\" (1.778 m).    Weight as of this encounter: 247 lb (112 kg).  Medication Reconciliation: complete  "

## 2017-07-19 NOTE — MR AVS SNAPSHOT
After Visit Summary   7/19/2017    Stef Bhatt    MRN: [id number]           Patient Information     Date Of Birth          1945        Visit Information        Provider Department      7/19/2017 11:00 AM Marcelino Foster MD [facility]        Today's Diagnoses     Plantar warts    -  1      Care Instructions          Thank you for choosing [facility].  You may be receiving a survey in the mail from [facility] regarding your visit today.  Please take a few minutes to complete and return the survey to let us know how we are doing.      If you have questions or concerns, please contact us via Vine or you can contact your care team at [phone].    Our Clinic hours are:  Monday 6:40 am  to 7:00 pm  Tuesday -Friday 6:40 am to 5:00 pm    The [place] outpatient lab hours are:  Monday - Friday 6:10 am to 4:45 pm  Saturdays 7:00 am to 11:00 am  Appointments are required, call [phone]    If you have clinical questions after hours or would like to schedule an appointment,  call the clinic at [phone].    (B07.0) Plantar warts  (primary encounter diagnosis)  Comment:   Plan: we discussed the options and will use the #10 blade scalpel to shave this, about 80-90% gone.   No bleeding noted. He will use the OTC wart therapy as discussed. Avoid contagious exposures.   Follow up as needed.           Follow-ups after your visit        Your next 10 appointments already scheduled     Aug 03, 2017 11:15 AM CDT   [facility]ticoagulation Visit with [facility]   [facility]    [address] [address]   [phone]            Sep 12, 2017 10:30 AM CDT   LAB with [facility]   [facility]    [address] [address]   [phone]           Patient must bring picture ID.  Patient should be prepared to give a urine specimen  Please do not eat 10-12 hours before your  "appointment if you are coming in fasting for labs on lipids, cholesterol, or glucose (sugar).  Pregnant women should follow their Care Team instructions. Water with medications is okay. Do not drink coffee or other fluids.   If you have concerns about taking  your medications, please ask at office or if scheduling via [employer] send a message by clicking on Secure Messaging, Message Your Care Team.            Sep 14, 2017 10:00 AM CDT   SHORT with Marcelino Foster MD   [facility]    [address] [address] [phone]              Who to contact     If you have questions or need follow up information about today's clinic visit or your schedule please contact [facility] directly at [phone].  Normal or non-critical lab and imaging results will be communicated to you by [employer], letter or phone within 4 business days after the clinic has received the results. If you do not hear from us within 7 days, please contact the clinic through [employer] or phone. If you have a critical or abnormal lab result, we will notify you by phone as soon as possible.  Submit refill requests through [employer] or call your pharmacy and they will forward the refill request to us. Please allow 3 business days for your refill to be completed.          Additional Information About Your Visit        [employer] Information     [employer] lets you send messages to your doctor, view your test results, renew your prescriptions, schedule appointments and more. To sign up, go to [email or web address] . Click on \"Log in\" on the left side of the screen, which will take you to the Welcome page. Then click on \"Sign up Now\" on the right side of the page.     You will be asked to enter the access code listed below, as well as some personal information. Please follow the directions to create your username and password.     Your access code is: [email or web address]  Expires: 10/17/2017 " "11:47 AM     Your access code will  in 90 days. If you need help or a new code, please call your [facility] clinic or [phone].        Care EveryWhere ID     This is your Care EveryWhere ID. This could be used by other organizations to access your [facility] medical records  [id number]        Your Vitals Were     Pulse Temperature Height Pulse Oximetry BMI (Body Mass Index)       71 97  F (36.1  C) (Tympanic) 5' 10\" (1.778 m) 97% 35.44 kg/m2        Blood Pressure from Last 3 Encounters:   17 112/73   17 125/80   17 128/81    Weight from Last 3 Encounters:   17 247 lb (112 kg)   17 253 lb (114.8 kg)   17 249 lb (112.9 kg)              Today, you had the following     No orders found for display       Primary Care Provider Office Phone # Fax #    Marcelino Foster [phone]       [facility] [address] [address]        Equal Access to Services     BARBI ROMERO : Hadii janna ku rubyo Soclaudia, waaxda ludangadaha, qaybta kaalmada grisel, susanne padilla. So [facility] [phone].    ATENCIÓN: Si habla español, tiene a chau disposición servicios gratuitos de asistencia lingüística. Sandraame al [phone].    We comply with applicable federal civil rights laws and [place] laws. We do not discriminate on the basis of race, color, national origin, age, disability sex, sexual orientation or gender identity.            Thank you!     Thank you for choosing [facility]  for your care. Our goal is always to provide you with excellent care. Hearing back from our patients is one way we can continue to improve our services. Please take a few minutes to complete the written survey that you may receive in the mail after your visit with us. Thank you!             Your Updated Medication List - Protect others around you: Learn how to safely use, store and throw away your medicines at www.disposemymeds.org. "          This list is accurate as of: 7/19/17 11:47 AM.  Always use your most recent med list.                   Brand Name Dispense Instructions for use Diagnosis    albuterol 108 (90 BASE) MCG/ACT Inhaler    VENTOLIN HFA    1 Inhaler    Inhale 2 puffs into the lungs every 4 hours as needed Pt will call to order    COPD (chronic obstructive pulmonary disease) (H)       ALLEGRA 180 MG tablet   Generic drug:  fexofenadine     30 tablet    Take 180 mg by mouth every evening        ascorbic acid 1000 MG Tabs    vitamin C     Take 1,000 mg by mouth daily In winter only        aspirin 81 MG tablet     1 tablet    Take 81 mg by mouth every evening.        blood glucose monitoring lancets     100 Each    use as directed to test blood sugars up to once daily; pt will call to order    Type II or unspecified type diabetes mellitus without mention of complication, not stated as uncontrolled       * blood glucose monitoring test strip    no brand specified    3 Month    Use as directed to check sugars twice a day    Type II or unspecified type diabetes mellitus without mention of complication, not stated as uncontrolled       * blood glucose monitoring test strip    ACCU-CHEK COMPACT DRUM    270 strip    Use to test blood sugar three times daily or as directed.    Obesity, unspecified, Type 2 diabetes, HbA1C goal < 8% (H)       cefuroxime 500 MG tablet    CEFTIN    20 tablet    Take 1 tablet (500 mg) by mouth 2 times daily    Chronic bronchitis, unspecified chronic bronchitis type (H), Panlobular emphysema (H), Type 2 diabetes mellitus with diabetic neuropathy, without long-term current use of insulin (H)       glipiZIDE 10 MG 24 hr tablet    glipiZIDE XL    180 tablet    Take 2 in the am    Type 2 diabetes mellitus without complication, without long-term current use of insulin (H)       ipratropium - albuterol 0.5 mg/2.5 mg/3 mL 0.5-2.5 (3) MG/3ML neb solution    DUONEB     Take 1 vial by nebulization every 6 hours as needed  for shortness of breath / dyspnea or wheezing        ketoconazole 2 % shampoo    NIZORAL    120 mL    Apply daily then rinse. Pt will call to order    Seborrheic dermatitis       losartan 50 MG tablet    COZAAR    90 tablet    Take 1 tablet (50 mg) by mouth daily    Essential hypertension with goal blood pressure less than 130/80       metFORMIN 500 MG tablet    GLUCOPHAGE    90 tablet    Take 1 tablet (500 mg) by mouth daily (with dinner)    Type 2 diabetes mellitus without complication, without long-term current use of insulin (H)       metoprolol 50 MG 24 hr tablet    TOPROL-XL    270 tablet    100 mg in am, 50 mg in pm    Essential hypertension with goal blood pressure less than 130/80       MUCINEX 600 MG 12 hr tablet   Generic drug:  guaiFENesin      Take 400-600 mg by mouth 2 times daily        nitroGLYcerin 0.4 MG sublingual tablet    NITROSTAT    25 tablet    Place 1 tablet (0.4 mg) under the tongue every 5 minutes as needed for chest pain    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    Peripheral polyneuropathy (H)       PSYLLIUM HUSK           simvastatin 20 MG tablet    ZOCOR    90 tablet    Take 1 tablet (20 mg) by mouth daily    Hyperlipidemia with target LDL less than 100       TYLENOL 500 MG tablet   Generic drug:  acetaminophen      Take 500 mg by mouth every 6 hours as needed for mild pain        TYLENOL PM EXTRA STRENGTH  MG tablet   Generic drug:  diphenhydrAMINE-acetaminophen      Take 1 tablet by mouth nightly as needed for sleep        umeclidinium-vilanterol 62.5-25 MCG/INH oral inhaler    ANORO ELLIPTA     Inhale 1 puff into the lungs daily        warfarin 2 MG tablet    COUMADIN    150 tablet    As directed by  Anticoagulation Clinic (current dose 2mg MF, 4mg rest of the week)    Long term (current) use of anticoagulants       * Notice:  This list has 2 medication(s) that are the same as other medications prescribed for you. Read the directions carefully, and ask your doctor or other care provider to review them with you.

## 2017-07-19 NOTE — PROGRESS NOTES
SUBJECTIVE:                                                    Stef Bhatt is a 72 year old male who presents to clinic today for the following health issues:      WART(S)      Onset: Since before Christmas 2016.    Description (location/number): 1 plantar wart-Left foot.  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  The pain from the wart has been causing him to fall recently when walking at times, due to the pain.    Accompanying signs and symptoms: Painful: YES- feels like he is walking on a rock.  This has been getting worse.    History: prior warts: YES- when he was in high school.    Therapies tried and outcome: Was seen by Dr. Irvin and had treatment done.  Recent treatment in clinic on 4-12-17 with liquid nitrogen.  Here today to discuss about treatment due to the pain.        Current Outpatient Prescriptions:      umeclidinium-vilanterol (ANORO ELLIPTA) 62.5-25 MCG/INH oral inhaler, Inhale 1 puff into the lungs daily, Disp: , Rfl:      losartan (COZAAR) 50 MG tablet, Take 1 tablet (50 mg) by mouth daily, Disp: 90 tablet, Rfl: 3     metoprolol (TOPROL-XL) 50 MG 24 hr tablet, 100 mg in am, 50 mg in pm, Disp: 270 tablet, Rfl: 3     simvastatin (ZOCOR) 20 MG tablet, Take 1 tablet (20 mg) by mouth daily, Disp: 90 tablet, Rfl: 3     glipiZIDE (GLIPIZIDE XL) 10 MG 24 hr tablet, Take 2 in the am, Disp: 180 tablet, Rfl: 3     metFORMIN (GLUCOPHAGE) 500 MG tablet, Take 1 tablet (500 mg) by mouth daily (with dinner), Disp: 90 tablet, Rfl: 3     nitroglycerin (NITROSTAT) 0.4 MG sublingual tablet, Place 1 tablet (0.4 mg) under the tongue every 5 minutes as needed for chest pain, Disp: 25 tablet, Rfl: 11     pregabalin (LYRICA) 50 MG capsule, Take 2 in the am, 2 in the pm., Disp: 120 capsule, Rfl: 5     warfarin (COUMADIN) 2 MG tablet, As directed by Anticoagulation Clinic (current dose 2mg MF, 4mg rest of the week), Disp: 150 tablet, Rfl: 3     cefUROXime (CEFTIN) 500 MG tablet, Take 1  tablet (500 mg) by mouth 2 times daily, Disp: 20 tablet, Rfl: 3     ketoconazole (NIZORAL) 2 % shampoo, Apply daily then rinse. Pt will call to order, Disp: 120 mL, Rfl: 11     ipratropium - albuterol 0.5 mg/2.5 mg/3 mL (DUONEB) 0.5-2.5 (3) MG/3ML nebulization, Take 1 vial by nebulization every 6 hours as needed for shortness of breath / dyspnea or wheezing, Disp: , Rfl:      predniSONE (DELTASONE) 10 MG tablet, Take 10 mg by mouth daily, Disp: , Rfl:      diphenhydrAMINE-acetaminophen (TYLENOL PM EXTRA STRENGTH)  MG tablet, Take 1 tablet by mouth nightly as needed for sleep, Disp: , Rfl:      albuterol (VENTOLIN HFA) 108 (90 BASE) MCG/ACT inhaler, Inhale 2 puffs into the lungs every 4 hours as needed Pt will call to order, Disp: 1 Inhaler, Rfl: 11     blood glucose (ACCU-CHEK COMPACT DRUM) test strip, Use to test blood sugar three times daily or as directed., Disp: 270 strip, Rfl: 1     ascorbic acid (VITAMIN C) 1000 MG TABS, Take 1,000 mg by mouth daily In winter only, Disp: , Rfl:      PSYLLIUM HUSK, , Disp: , Rfl:      guaiFENesin (MUCINEX) 600 MG 12 hr tablet, Take 400-600 mg by mouth 2 times daily , Disp: , Rfl:      glucose blood VI test strips strip, Use as directed to check sugars twice a day, Disp: 3 Month, Rfl: 3     fexofenadine (ALLEGRA) 180 MG tablet, Take 180 mg by mouth every evening , Disp: 30 tablet, Rfl: 1     aspirin 81 MG tablet, Take 81 mg by mouth every evening., Disp: 1 tablet, Rfl: 3     ACCU-CHEK SOFTCLIX LANCETS MISC, use as directed to test blood sugars up to once daily; pt will call to order, Disp: 100 Each, Rfl: 11     acetaminophen (TYLENOL) 500 MG tablet, Take 500 mg by mouth every 6 hours as needed for mild pain, Disp: , Rfl:     Patient Active Problem List   Diagnosis     Coronary atherosclerosis     Essential hypertension     Hemorrhage of rectum and anus     Allergic Rhinitis     AK (actinic keratosis)     HYPERLIPIDEMIA LDL GOAL <100     Ventral hernia     Colon polyp      "Dysphonia     Senile nuclear sclerosis     Acute myocardial infarction of other specified sites, episode of care unspecified     Health Care Home     Atrial fibrillation (H)     Advance Care Planning     Edge's neuroma     COPD (chronic obstructive pulmonary disease) (H)     Long term current use of anticoagulant therapy     Hyperlipidemia with target LDL less than 100     Morbid obesity (H)     Type 2 diabetes mellitus with diabetic neuropathy (H)     Peripheral polyneuropathy (H)     Chronic bronchitis, unspecified chronic bronchitis type (H)     DJD (degenerative joint disease), cervical     Benign neoplasm of skin of trunk, except scrotum       Blood pressure 112/73, pulse 71, temperature 97  F (36.1  C), temperature source Tympanic, height 5' 10\" (1.778 m), weight 247 lb (112 kg), SpO2 97 %.    Exam:  GENERAL APPEARANCE: healthy, alert and no distress  SKIN: wart on the plantar surface of the left foot under the 3rd MTP joint.     (B07.0) Plantar warts  (primary encounter diagnosis)  Comment:   Plan: we discussed the options and will use the #10 blade scalpel to shave this, about 80-90% gone.   No bleeding noted. He will use the OTC wart therapy as discussed. Avoid contagious exposures.   Follow up as needed.       Marcelino Foster      "

## 2017-07-19 NOTE — PATIENT INSTRUCTIONS
Thank you for choosing [facility].  You may be receiving a survey in the mail from [facility] regarding your visit today.  Please take a few minutes to complete and return the survey to let us know how we are doing.      If you have questions or concerns, please contact us via [employer] or you can contact your care team at [phone].    Our Clinic hours are:  Monday 6:40 am  to 7:00 pm  Tuesday -Friday 6:40 am to 5:00 pm    The [place] outpatient lab hours are:  Monday - Friday 6:10 am to 4:45 pm  Saturdays 7:00 am to 11:00 am  Appointments are required, call [phone]    If you have clinical questions after hours or would like to schedule an appointment,  call the clinic at [phone].    (B07.0) Plantar warts  (primary encounter diagnosis)  Comment:   Plan: we discussed the options and will use the #10 blade scalpel to shave this, about 80-90% gone.   No bleeding noted. He will use the OTC wart therapy as discussed. Avoid contagious exposures.   Follow up as needed.

## 2017-08-03 ENCOUNTER — OFFICE VISIT (OUTPATIENT)
Dept: FAMILY MEDICINE | Facility: CLINIC | Age: 72
End: 2017-08-03
Payer: MEDICARE

## 2017-08-03 ENCOUNTER — ANTICOAGULATION THERAPY VISIT (OUTPATIENT)
Dept: ANTICOAGULATION | Facility: CLINIC | Age: 72
End: 2017-08-03
Payer: MEDICARE

## 2017-08-03 VITALS
SYSTOLIC BLOOD PRESSURE: 113 MMHG | BODY MASS INDEX: 35.5 KG/M2 | OXYGEN SATURATION: 95 % | DIASTOLIC BLOOD PRESSURE: 76 MMHG | TEMPERATURE: 97.2 F | HEIGHT: 70 IN | HEART RATE: 79 BPM | WEIGHT: 248 LBS

## 2017-08-03 DIAGNOSIS — Z79.01 LONG TERM CURRENT USE OF ANTICOAGULANT THERAPY: ICD-10-CM

## 2017-08-03 DIAGNOSIS — L89.301 DECUBITUS ULCER OF BUTTOCK, STAGE 1, UNSPECIFIED LATERALITY: Primary | ICD-10-CM

## 2017-08-03 LAB — INR POINT OF CARE: 2.3 (ref 0.86–1.14)

## 2017-08-03 PROCEDURE — 36416 COLLJ CAPILLARY BLOOD SPEC: CPT

## 2017-08-03 PROCEDURE — 99207 ZZC NO CHARGE NURSE ONLY: CPT

## 2017-08-03 PROCEDURE — 85610 PROTHROMBIN TIME: CPT | Mod: QW

## 2017-08-03 PROCEDURE — 99213 OFFICE O/P EST LOW 20 MIN: CPT | Performed by: FAMILY MEDICINE

## 2017-08-03 NOTE — MR AVS SNAPSHOT
Stef Bhatt   8/3/2017 11:15 AM   Anticoagulation Therapy Visit    Description:  72 year old male   Provider:  [place] [facility]   Department:  [facility]           INR as of 8/3/2017     Today's INR 2.3      Anticoagulation Summary as of 8/3/2017     INR goal 2.0-3.0   Today's INR 2.3   Full instructions 2 mg on Mon, Fri; 4 mg all other days   Next INR check 10/5/2017    Indications   Atrial fibrillation (H) [I48.91]  Long term current use of anticoagulant therapy [Z79.01]         Description     No change, recheck INR in 8 weeks.      Your next Anticoagulation Clinic appointment(s)     Aug 03, 2017 11:15 AM CDT   [facility] [place] [facility]    [address] [address]   [phone]            Oct 05, 2017 11:15 AM CDT   Anticoagulation Visit with [place] [facility]   [facility]    [address] [address]   [phone]              Contact Numbers     Please call [phone] to cancel and/or reschedule your appointment.  Please call [phone] with any problems or questions regarding your therapy          August 2017 Details    Sun Mon Tue Wed Thu Fri Sat       1               2               3      4 mg   See details      4      2 mg         5      4 mg           6      4 mg         7      2 mg         8      4 mg         9      4 mg         10      4 mg         11      2 mg         12      4 mg           13      4 mg         14      2 mg         15      4 mg         16      4 mg         17      4 mg         18      2 mg         19      4 mg           20      4 mg         21      2 mg         22      4 mg         23      4 mg         24      4 mg         25      2 mg         26      4 mg           27      4 mg         28      2 mg         29      4 mg         30      4 mg         31      4 mg            Date Details   08/03 This INR check               How to  take your warfarin dose     To take:  2 mg Take 1 of the 2 mg tablets.    To take:  4 mg Take 2 of the 2 mg tablets.           September 2017 Details    Sun Mon Tue Wed Thu Fri Sat          1      2 mg         2      4 mg           3      4 mg         4      2 mg         5      4 mg         6      4 mg         7      4 mg         8      2 mg         9      4 mg           10      4 mg         11      2 mg         12      4 mg         13      4 mg         14      4 mg         15      2 mg         16      4 mg           17      4 mg         18      2 mg         19      4 mg         20      4 mg         21      4 mg         22      2 mg         23      4 mg           24      4 mg         25      2 mg         26      4 mg         27      4 mg         28      4 mg         29      2 mg         30      4 mg          Date Details   No additional details            How to take your warfarin dose     To take:  2 mg Take 1 of the 2 mg tablets.    To take:  4 mg Take 2 of the 2 mg tablets.           October 2017 Details    Sun Mon Tue Wed Thu Fri Sat     1      4 mg         2      2 mg         3      4 mg         4      4 mg         5            6               7                 8               9               10               11               12               13               14                 15               16               17               18               19               20               21                 22               23               24               25               26               27               28                 29               30               31                    Date Details   No additional details    Date of next INR:  10/5/2017         How to take your warfarin dose     To take:  2 mg Take 1 of the 2 mg tablets.    To take:  4 mg Take 2 of the 2 mg tablets.

## 2017-08-03 NOTE — MR AVS SNAPSHOT
After Visit Summary   8/3/2017    Stef Bhatt    MRN: [id number]           Patient Information     Date Of Birth          1945        Visit Information        Provider Department      8/3/2017 12:40 PM Marcelino Foster MD [facility]        Today's Diagnoses     Decubitus ulcer of buttock, stage 1, unspecified laterality    -  1      Care Instructions          Thank you for choosing [facility].  You may be receiving a survey in the mail from [facility] regarding your visit today.  Please take a few minutes to complete and return the survey to let us know how we are doing.      If you have questions or concerns, please contact us via Qyuki or you can contact your care team at [phone].    Our Clinic hours are:  Monday 6:40 am  to 7:00 pm  Tuesday -Friday 6:40 am to 5:00 pm    The [place] outpatient lab hours are:  Monday - Friday 6:10 am to 4:45 pm  Saturdays 7:00 am to 11:00 am  Appointments are required, call [phone]    If you have clinical questions after hours or would like to schedule an appointment,  call the clinic at [phone].      (L89.301) Decubitus ulcer of buttock, stage 1, unspecified laterality  (primary encounter diagnosis)  Comment:   Plan: Consider elevating the head of the 4 to 6 inches. The bed is still flat and then you are able to change from side to side. Use the doughnut pillows.   Use the dressing changes if this area is moist. Use Telfa as the non stick dressing next to the skin. Destick the tape to help the skin. Stay well hydrated.   Get enough protein in the diet. Exercise will help circulation. You may need oxygen at night, and we may need a bedtime oxygen desaturation study.           Follow-ups after your visit        Your next 10 appointments already scheduled     Sep 12, 2017 10:30 AM CDT   LAB with [facility] [facility]   [facility] ([facility])    [address] [address] [phone]  "          Patient must bring picture ID. Patient should be prepared to give a urine specimen  Please do not eat 10-12 hours before your appointment if you are coming in fasting for labs on lipids, cholesterol, or glucose (sugar). Pregnant women should follow their Care Team instructions. Water with medications is okay. Do not drink coffee or other fluids. If you have concerns about taking  your medications, please ask at office or if scheduling via Somewhere, send a message by clicking on Secure Messaging, Message Your Care Team.            Sep 14, 2017 10:00 AM CDT   [facility] Marcelino Foster MD   [facility]    [address] [address]   [phone]            Oct 05, 2017 11:15 AM CDT   Anticoagulation Visit with [place] [facility]   [facility]    [address] [address]   [phone]              Who to contact     If you have questions or need follow up information about today's clinic visit or your schedule please contact [facility] directly at [phone].  Normal or non-critical lab and imaging results will be communicated to you by MyChart, letter or phone within 4 business days after the clinic has received the results. If you do not hear from us within 7 days, please contact the clinic through [employer] or phone. If you have a critical or abnormal lab result, we will notify you by phone as soon as possible.  Submit refill requests through Somewhere or call your pharmacy and they will forward the refill request to us. Please allow 3 business days for your refill to be completed.          Additional Information About Your Visit        [employer] Information     Somewhere lets you send messages to your doctor, view your test results, renew your prescriptions, schedule appointments and more. To sign up, go to [email or web address] . Click on \"Log in\" on the left side of the " "screen, which will take you to the Welcome page. Then click on \"Sign up Now\" on the right side of the page.     You will be asked to enter the access code listed below, as well as some personal information. Please follow the directions to create your username and password.     Your access code is: [email or web address]  Expires: 10/17/2017 11:47 AM     Your access code will  in 90 days. If you need help or a new code, please call your [place] clinic or [phone].        Care EveryWhere ID     This is your Care EveryWhere ID. This could be used by other organizations to access your [place] medical records  [id number]        Your Vitals Were     Pulse Temperature Height Pulse Oximetry BMI (Body Mass Index)       79 97.2  F (36.2  C) (Tympanic) 5' 10\" (1.778 m) 95% 35.58 kg/m2        Blood Pressure from Last 3 Encounters:   17 113/76   17 112/73   17 125/80    Weight from Last 3 Encounters:   17 248 lb (112.5 kg)   17 247 lb (112 kg)   17 253 lb (114.8 kg)              Today, you had the following     No orders found for display       Primary Care Provider Office Phone # Fax #    Marcelino Foster [phone]       [facility] [address] [address]        Equal Access to Services     BARBI ROMERO : Hadii janna beltrano Soclaudia, waaxda luqadaha, qaybta kaalmada grisel, susanne padilla. So [facility] [phone].    ATENCIÓN: Si habla felipe, tiene a chau disposición servicios gratuitos de asistencia lingüística. Lljames al [phone].    We comply with applicable federal civil rights laws and [place] laws. We do not discriminate on the basis of race, color, national origin, age, disability sex, sexual orientation or gender identity.            Thank you!     Thank you for choosing [facility]  for your care. Our goal is always to provide you with excellent care. Hearing back from our patients is " one way we can continue to improve our services. Please take a few minutes to complete the written survey that you may receive in the mail after your visit with us. Thank you!             Your Updated Medication List - Protect others around you: Learn how to safely use, store and throw away your medicines at www.disposemymeds.org.          This list is accurate as of: 8/3/17  1:21 PM.  Always use your most recent med list.                   Brand Name Dispense Instructions for use Diagnosis    albuterol 108 (90 BASE) MCG/ACT Inhaler    VENTOLIN HFA    1 Inhaler    Inhale 2 puffs into the lungs every 4 hours as needed Pt will call to order    COPD (chronic obstructive pulmonary disease) (H)       ALLEGRA 180 MG tablet   Generic drug:  fexofenadine     30 tablet    Take 180 mg by mouth every evening        ascorbic acid 1000 MG Tabs    vitamin C     Take 1,000 mg by mouth daily In winter only        aspirin 81 MG tablet     1 tablet    Take 81 mg by mouth every evening.        blood glucose monitoring lancets     100 Each    use as directed to test blood sugars up to once daily; pt will call to order    Type II or unspecified type diabetes mellitus without mention of complication, not stated as uncontrolled       * blood glucose monitoring test strip    no brand specified    3 Month    Use as directed to check sugars twice a day    Type II or unspecified type diabetes mellitus without mention of complication, not stated as uncontrolled       * blood glucose monitoring test strip    ACCU-CHEK COMPACT DRUM    270 strip    Use to test blood sugar three times daily or as directed.    Obesity, unspecified, Type 2 diabetes, HbA1C goal < 8% (H)       cefuroxime 500 MG tablet    CEFTIN    20 tablet    Take 1 tablet (500 mg) by mouth 2 times daily    Chronic bronchitis, unspecified chronic bronchitis type (H), Panlobular emphysema (H), Type 2 diabetes mellitus with diabetic neuropathy, without long-term current use of insulin  (H)       glipiZIDE 10 MG 24 hr tablet    glipiZIDE XL    180 tablet    Take 2 in the am    Type 2 diabetes mellitus without complication, without long-term current use of insulin (H)       ipratropium - albuterol 0.5 mg/2.5 mg/3 mL 0.5-2.5 (3) MG/3ML neb solution    DUONEB     Take 1 vial by nebulization every 6 hours as needed for shortness of breath / dyspnea or wheezing        ketoconazole 2 % shampoo    NIZORAL    120 mL    Apply daily then rinse. Pt will call to order    Seborrheic dermatitis       losartan 50 MG tablet    COZAAR    90 tablet    Take 1 tablet (50 mg) by mouth daily    Essential hypertension with goal blood pressure less than 130/80       metFORMIN 500 MG tablet    GLUCOPHAGE    90 tablet    Take 1 tablet (500 mg) by mouth daily (with dinner)    Type 2 diabetes mellitus without complication, without long-term current use of insulin (H)       metoprolol 50 MG 24 hr tablet    TOPROL-XL    270 tablet    100 mg in am, 50 mg in pm    Essential hypertension with goal blood pressure less than 130/80       MUCINEX 600 MG 12 hr tablet   Generic drug:  guaiFENesin      Take 400-600 mg by mouth 2 times daily        nitroGLYcerin 0.4 MG sublingual tablet    NITROSTAT    25 tablet    Place 1 tablet (0.4 mg) under the tongue every 5 minutes as needed for chest pain    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    Peripheral polyneuropathy (H)       PSYLLIUM HUSK           simvastatin 20 MG tablet    ZOCOR    90 tablet    Take 1 tablet (20 mg) by mouth daily    Hyperlipidemia with target LDL less than 100       TYLENOL 500 MG tablet   Generic drug:  acetaminophen      Take 500 mg by mouth every 6 hours as needed for mild pain        TYLENOL PM  EXTRA STRENGTH  MG tablet   Generic drug:  diphenhydrAMINE-acetaminophen      Take 1 tablet by mouth nightly as needed for sleep        umeclidinium-vilanterol 62.5-25 MCG/INH oral inhaler    ANORO ELLIPTA     Inhale 1 puff into the lungs daily        warfarin 2 MG tablet    COUMADIN    150 tablet    As directed by Anticoagulation Clinic (current dose 2mg MF, 4mg rest of the week)    Long term (current) use of anticoagulants       * Notice:  This list has 2 medication(s) that are the same as other medications prescribed for you. Read the directions carefully, and ask your doctor or other care provider to review them with you.

## 2017-08-03 NOTE — PROGRESS NOTES
SUBJECTIVE:                                                    Stef Bhatt is a 72 year old male who presents to clinic today for the following health issues:      OPEN SORES ON THE BUTT      Duration: 4-5 days     Description (location/character/radiation): Still has two open sores on the butt, one on each side.  Had three sores that they were able to heal.  With his mobility he is not moving around as much as he would like.     Intensity:  Moderate- can be very painful.    Accompanying signs and symptoms: Has noticed any drainage from the sores.  No fever or chills.    History (similar episodes/previous evaluation): None    Precipitating or alleviating factors: Certain positions with sitting.  No pain with walking.    Therapies tried and outcome: Bacitracin, Aloe Vera.       Current Outpatient Prescriptions:      umeclidinium-vilanterol (ANORO ELLIPTA) 62.5-25 MCG/INH oral inhaler, Inhale 1 puff into the lungs daily, Disp: , Rfl:      losartan (COZAAR) 50 MG tablet, Take 1 tablet (50 mg) by mouth daily, Disp: 90 tablet, Rfl: 3     metoprolol (TOPROL-XL) 50 MG 24 hr tablet, 100 mg in am, 50 mg in pm, Disp: 270 tablet, Rfl: 3     simvastatin (ZOCOR) 20 MG tablet, Take 1 tablet (20 mg) by mouth daily, Disp: 90 tablet, Rfl: 3     glipiZIDE (GLIPIZIDE XL) 10 MG 24 hr tablet, Take 2 in the am, Disp: 180 tablet, Rfl: 3     metFORMIN (GLUCOPHAGE) 500 MG tablet, Take 1 tablet (500 mg) by mouth daily (with dinner), Disp: 90 tablet, Rfl: 3     nitroglycerin (NITROSTAT) 0.4 MG sublingual tablet, Place 1 tablet (0.4 mg) under the tongue every 5 minutes as needed for chest pain, Disp: 25 tablet, Rfl: 11     pregabalin (LYRICA) 50 MG capsule, Take 2 in the am, 2 in the pm., Disp: 120 capsule, Rfl: 5     warfarin (COUMADIN) 2 MG tablet, As directed by Anticoagulation Clinic (current dose 2mg MF, 4mg rest of the week), Disp: 150 tablet, Rfl: 3     cefUROXime (CEFTIN) 500 MG tablet, Take 1 tablet (500 mg) by mouth 2 times  daily, Disp: 20 tablet, Rfl: 3     ipratropium - albuterol 0.5 mg/2.5 mg/3 mL (DUONEB) 0.5-2.5 (3) MG/3ML nebulization, Take 1 vial by nebulization every 6 hours as needed for shortness of breath / dyspnea or wheezing, Disp: , Rfl:      predniSONE (DELTASONE) 10 MG tablet, Take 10 mg by mouth daily, Disp: , Rfl:      diphenhydrAMINE-acetaminophen (TYLENOL PM EXTRA STRENGTH)  MG tablet, Take 1 tablet by mouth nightly as needed for sleep, Disp: , Rfl:      albuterol (VENTOLIN HFA) 108 (90 BASE) MCG/ACT inhaler, Inhale 2 puffs into the lungs every 4 hours as needed Pt will call to order, Disp: 1 Inhaler, Rfl: 11     PSYLLIUM HUSK, , Disp: , Rfl:      guaiFENesin (MUCINEX) 600 MG 12 hr tablet, Take 400-600 mg by mouth 2 times daily , Disp: , Rfl:      glucose blood VI test strips strip, Use as directed to check sugars twice a day, Disp: 3 Month, Rfl: 3     fexofenadine (ALLEGRA) 180 MG tablet, Take 180 mg by mouth every evening , Disp: 30 tablet, Rfl: 1     aspirin 81 MG tablet, Take 81 mg by mouth every evening., Disp: 1 tablet, Rfl: 3     ACCU-CHEK SOFTCLIX LANCETS MISC, use as directed to test blood sugars up to once daily; pt will call to order, Disp: 100 Each, Rfl: 11     ketoconazole (NIZORAL) 2 % shampoo, Apply daily then rinse. Pt will call to order, Disp: 120 mL, Rfl: 11     acetaminophen (TYLENOL) 500 MG tablet, Take 500 mg by mouth every 6 hours as needed for mild pain, Disp: , Rfl:      blood glucose (ACCU-CHEK COMPACT DRUM) test strip, Use to test blood sugar three times daily or as directed., Disp: 270 strip, Rfl: 1     ascorbic acid (VITAMIN C) 1000 MG TABS, Take 1,000 mg by mouth daily In winter only, Disp: , Rfl:     Patient Active Problem List   Diagnosis     Coronary atherosclerosis     Essential hypertension     Hemorrhage of rectum and anus     Allergic Rhinitis     AK (actinic keratosis)     HYPERLIPIDEMIA LDL GOAL <100     Ventral hernia     Colon polyp     Dysphonia     Senile nuclear  "sclerosis     Acute myocardial infarction of other specified sites, episode of care unspecified     Health Care Home     Atrial fibrillation (H)     Advance Care Planning     Minesh's neuroma     COPD (chronic obstructive pulmonary disease) (H)     Long term current use of anticoagulant therapy     Hyperlipidemia with target LDL less than 100     Morbid obesity (H)     Type 2 diabetes mellitus with diabetic neuropathy (H)     Peripheral polyneuropathy (H)     Chronic bronchitis, unspecified chronic bronchitis type (H)     DJD (degenerative joint disease), cervical     Benign neoplasm of skin of trunk, except scrotum       Blood pressure 113/76, pulse 79, temperature 97.2  F (36.2  C), temperature source Tympanic, height 5' 10\" (1.778 m), weight 248 lb (112.5 kg), SpO2 95 %.    Exam:  GENERAL APPEARANCE: healthy, alert and no distress  SKIN: on the buttocks on each side there is a shallow ulcer about 1 mm in depth, 2 cm in length.   There is no redness or drainage and no apparent infection. The skin has good capillary refill.   PSYCH: mentation appears normal and affect normal/bright      (L89.301) Decubitus ulcer of buttock, stage 1, unspecified laterality  (primary encounter diagnosis)  Comment:   Plan: Consider elevating the head of the 4 to 6 inches. The bed is still flat and then you are able to change from side to side. Use the doughnut pillows.   Use the dressing changes if this area is moist. Use Telfa as the non stick dressing next to the skin. Destick the tape to help the skin. Stay well hydrated.   Get enough protein in the diet. Exercise will help circulation. You may need oxygen at night, and we may need a bedtime oxygen desaturation study.       Marcelino Foster      "

## 2017-08-03 NOTE — PATIENT INSTRUCTIONS
Thank you for choosing [facility]  You may be receiving a survey in the mail from Ariana Mendiola regarding your visit today.  Please take a few minutes to complete and return the survey to let us know how we are doing.      If you have questions or concerns, please contact us via [employer] or you can contact your care team at [phone].    Our Clinic hours are:  Monday 6:40 am  to 7:00 pm  Tuesday -Friday 6:40 am to 5:00 pm    The [place] outpatient lab hours are:  Monday - Friday 6:10 am to 4:45 pm  Saturdays 7:00 am to 11:00 am  Appointments are required, call [phone]    If you have clinical questions after hours or would like to schedule an appointment,  call the clinic at [phone].      (L89.301) Decubitus ulcer of buttock, stage 1, unspecified laterality  (primary encounter diagnosis)  Comment:   Plan: Consider elevating the head of the 4 to 6 inches. The bed is still flat and then you are able to change from side to side. Use the doughnut pillows.   Use the dressing changes if this area is moist. Use Telfa as the non stick dressing next to the skin. Destick the tape to help the skin. Stay well hydrated.   Get enough protein in the diet. Exercise will help circulation. You may need oxygen at night, and we may need a bedtime oxygen desaturation study.

## 2017-08-03 NOTE — NURSING NOTE
"Chief Complaint   Patient presents with     Derm Problem     Open sores to check.       Initial /76  Pulse 79  Temp 97.2  F (36.2  C) (Tympanic)  Ht 5' 10\" (1.778 m)  Wt 248 lb (112.5 kg)  SpO2 95%  BMI 35.58 kg/m2 Estimated body mass index is 35.58 kg/(m^2) as calculated from the following:    Height as of this encounter: 5' 10\" (1.778 m).    Weight as of this encounter: 248 lb (112.5 kg).  Medication Reconciliation: complete  "

## 2017-08-03 NOTE — PROGRESS NOTES
ANTICOAGULATION FOLLOW-UP CLINIC VISIT    Patient Name:  Stef Bhatt  Date:  8/3/2017  Contact Type:  Face to Face    SUBJECTIVE:     Patient Findings     Positives Change in diet/appetite (eating 16 almonds daily), Other complaints (some o/a on bottom - seeing MD today. Had bleeding hemorrhoid but this has resolved. ), OTC meds (taking 4 - 5 psyllium husk capsules daily)    Comments Has appt with PCP today.           OBJECTIVE    INR Protime   Date Value Ref Range Status   08/03/2017 2.3 (A) 0.86 - 1.14 Final       ASSESSMENT / PLAN  INR assessment THER    Recheck INR In: 8 WEEKS 9 weeks   INR Location Clinic      Anticoagulation Summary as of 8/3/2017     INR goal 2.0-3.0   Today's INR 2.3   Maintenance plan 2 mg (2 mg x 1) on Mon, Fri; 4 mg (2 mg x 2) all other days   Full instructions 2 mg on Mon, Fri; 4 mg all other days   Weekly total 24 mg   No change documented Blanca Nixon RN   Plan last modified Noelle Harrington RN (4/6/2015)   Next INR check 10/5/2017   Priority INR   Target end date Indefinite    Indications   Atrial fibrillation (H) [I48.91]  Long term current use of anticoagulant therapy [Z79.01]         Anticoagulation Episode Summary     INR check location     Preferred lab     Send INR reminders to [facility]    Comments * warfarin 2 mg tabs. Dr Shelley Lennox is pulmonologist. Likes 9 week rechecks      Anticoagulation Care Providers     Provider Role Specialty Phone number    Marcelino Foster MD [facility] [phone]            See the Encounter Report to view Anticoagulation Flowsheet and Dosing Calendar (Go to Encounters tab in chart review, and find the Anticoagulation Therapy Visit)    Blanca Nixon, RN

## 2017-08-09 ENCOUNTER — TELEPHONE (OUTPATIENT)
Dept: ANTICOAGULATION | Facility: CLINIC | Age: 72
End: 2017-08-09

## 2017-08-09 NOTE — TELEPHONE ENCOUNTER
"Patient's wife called [facility] reporting that Stef has had a headache since yesterday that is not going away. He \"feels like somebody punched him in the eye\". The headache is localized to behind the eyes. No other symptoms are being reported and the headache is not unusually painful but is just lasting a long time.     [place] is wondering if the patient can take Excederin since tylenol is not helping. Writer explained that aspirin products are contraindicated with patients on warfarin. Also suggested that since the headache is not going away, he should consider a visit to UC/ED to evaluate.     Patient will continue to monitor for now and will seek medical care if symptoms persist much longer, get worse or new symptoms start.    Bernarda Roa, GONZÁLEZN, RN    "

## 2017-08-11 ENCOUNTER — HOSPITAL ENCOUNTER (EMERGENCY)
Facility: CLINIC | Age: 72
Discharge: HOME | End: 2017-08-11
Attending: FAMILY MEDICINE | Admitting: FAMILY MEDICINE
Payer: MEDICARE

## 2017-08-11 ENCOUNTER — APPOINTMENT (OUTPATIENT)
Dept: CT IMAGING | Facility: CLINIC | Age: 72
End: 2017-08-11
Attending: FAMILY MEDICINE
Payer: MEDICARE

## 2017-08-11 VITALS
SYSTOLIC BLOOD PRESSURE: 113 MMHG | RESPIRATION RATE: 16 BRPM | OXYGEN SATURATION: 96 % | DIASTOLIC BLOOD PRESSURE: 53 MMHG | TEMPERATURE: 97.7 F

## 2017-08-11 DIAGNOSIS — J01.40 ACUTE NON-RECURRENT PANSINUSITIS: ICD-10-CM

## 2017-08-11 DIAGNOSIS — H57.12 LEFT EYE PAIN: ICD-10-CM

## 2017-08-11 DIAGNOSIS — K08.9: ICD-10-CM

## 2017-08-11 DIAGNOSIS — K04.7 DENTAL INFECTION: ICD-10-CM

## 2017-08-11 DIAGNOSIS — H57.12 PAIN IN LEFT EYE: ICD-10-CM

## 2017-08-11 LAB
ANION GAP SERPL CALCULATED.3IONS-SCNC: 5 MMOL/L (ref 3–14)
BASOPHILS # BLD AUTO: 0 10E9/L (ref 0–0.2)
BASOPHILS NFR BLD AUTO: 0.2 %
BUN SERPL-MCNC: 17 MG/DL (ref 7–30)
CALCIUM SERPL-MCNC: 9.2 MG/DL (ref 8.5–10.1)
CHLORIDE SERPL-SCNC: 106 MMOL/L (ref 94–109)
CO2 SERPL-SCNC: 25 MMOL/L (ref 20–32)
CREAT SERPL-MCNC: 0.93 MG/DL (ref 0.66–1.25)
CRP SERPL-MCNC: 53.5 MG/L (ref 0–8)
DIFFERENTIAL METHOD BLD: NORMAL
EOSINOPHIL # BLD AUTO: 0.2 10E9/L (ref 0–0.7)
EOSINOPHIL NFR BLD AUTO: 2.1 %
ERYTHROCYTE [DISTWIDTH] IN BLOOD BY AUTOMATED COUNT: 13.3 % (ref 10–15)
ERYTHROCYTE [SEDIMENTATION RATE] IN BLOOD BY WESTERGREN METHOD: 14 MM/H (ref 0–20)
GFR SERPL CREATININE-BSD FRML MDRD: 81 ML/MIN/1.7M2
GLUCOSE SERPL-MCNC: 201 MG/DL (ref 70–99)
HCT VFR BLD AUTO: 47.9 % (ref 40–53)
HGB BLD-MCNC: 16.2 G/DL (ref 13.3–17.7)
IMM GRANULOCYTES # BLD: 0 10E9/L (ref 0–0.4)
IMM GRANULOCYTES NFR BLD: 0.2 %
INR PPP: 2.32 (ref 0.86–1.14)
LYMPHOCYTES # BLD AUTO: 1 10E9/L (ref 0.8–5.3)
LYMPHOCYTES NFR BLD AUTO: 11.7 %
MCH RBC QN AUTO: 31.3 PG (ref 26.5–33)
MCHC RBC AUTO-ENTMCNC: 33.8 G/DL (ref 31.5–36.5)
MCV RBC AUTO: 93 FL (ref 78–100)
MONOCYTES # BLD AUTO: 0.8 10E9/L (ref 0–1.3)
MONOCYTES NFR BLD AUTO: 9.4 %
NEUTROPHILS # BLD AUTO: 6.4 10E9/L (ref 1.6–8.3)
NEUTROPHILS NFR BLD AUTO: 76.4 %
PLATELET # BLD AUTO: 201 10E9/L (ref 150–450)
POTASSIUM SERPL-SCNC: 4.7 MMOL/L (ref 3.4–5.3)
RBC # BLD AUTO: 5.17 10E12/L (ref 4.4–5.9)
SODIUM SERPL-SCNC: 136 MMOL/L (ref 133–144)
WBC # BLD AUTO: 8.4 10E9/L (ref 4–11)

## 2017-08-11 PROCEDURE — 85025 COMPLETE CBC W/AUTO DIFF WBC: CPT | Performed by: FAMILY MEDICINE

## 2017-08-11 PROCEDURE — 80048 BASIC METABOLIC PNL TOTAL CA: CPT | Performed by: FAMILY MEDICINE

## 2017-08-11 PROCEDURE — 70486 CT MAXILLOFACIAL W/O DYE: CPT

## 2017-08-11 PROCEDURE — 86140 C-REACTIVE PROTEIN: CPT | Performed by: FAMILY MEDICINE

## 2017-08-11 PROCEDURE — 25000125 ZZHC RX 250: Performed by: FAMILY MEDICINE

## 2017-08-11 PROCEDURE — A9270 NON-COVERED ITEM OR SERVICE: HCPCS | Mod: GY | Performed by: FAMILY MEDICINE

## 2017-08-11 PROCEDURE — 99284 EMERGENCY DEPT VISIT MOD MDM: CPT | Mod: Z6 | Performed by: FAMILY MEDICINE

## 2017-08-11 PROCEDURE — 99284 EMERGENCY DEPT VISIT MOD MDM: CPT | Mod: 25 | Performed by: FAMILY MEDICINE

## 2017-08-11 PROCEDURE — 70450 CT HEAD/BRAIN W/O DYE: CPT

## 2017-08-11 PROCEDURE — 85652 RBC SED RATE AUTOMATED: CPT | Performed by: FAMILY MEDICINE

## 2017-08-11 PROCEDURE — 85610 PROTHROMBIN TIME: CPT | Performed by: FAMILY MEDICINE

## 2017-08-11 RX ORDER — TETRACAINE HYDROCHLORIDE 5 MG/ML
1-2 SOLUTION OPHTHALMIC ONCE
Status: COMPLETED | OUTPATIENT
Start: 2017-08-11 | End: 2017-08-11

## 2017-08-11 RX ORDER — TETRACAINE HYDROCHLORIDE 5 MG/ML
2 SOLUTION OPHTHALMIC ONCE
Status: DISCONTINUED | OUTPATIENT
Start: 2017-08-11 | End: 2017-08-11 | Stop reason: HOSPADM

## 2017-08-11 RX ORDER — OXYMETAZOLINE HYDROCHLORIDE 0.05 G/100ML
2 SPRAY NASAL 2 TIMES DAILY
Status: DISCONTINUED | OUTPATIENT
Start: 2017-08-11 | End: 2017-08-11 | Stop reason: HOSPADM

## 2017-08-11 RX ADMIN — OXYMETAZOLINE HYDROCHLORIDE 2 SPRAY: 5 SPRAY NASAL at 13:52

## 2017-08-11 RX ADMIN — TETRACAINE HYDROCHLORIDE 2 DROP: 5 SOLUTION OPHTHALMIC at 13:52

## 2017-08-11 ASSESSMENT — ENCOUNTER SYMPTOMS
FREQUENCY: 0
CHILLS: 0
DIARRHEA: 0
DYSURIA: 0
SORE THROAT: 0
COUGH: 0
VOMITING: 0
WHEEZING: 0
DIAPHORESIS: 0
SHORTNESS OF BREATH: 0
NAUSEA: 0
BLOOD IN STOOL: 0
HEADACHES: 1
PALPITATIONS: 0
CONSTIPATION: 0
ABDOMINAL PAIN: 0
FEVER: 0
SINUS PRESSURE: 0

## 2017-08-11 NOTE — ED PROVIDER NOTES
History     Chief Complaint   Patient presents with     Headache     ha for past 4 days and pain behind lt eye     HPI  Stef Bhatt is a 72 year old male who presents with 4 days of headache periorbital left-sided is been sharp and at times severe of 9 of 10 in intensity without trauma to this area and with occasional vision change that he notes corresponded to onset with blurring.  There is no diplopia.  There is no loss of the visual field in the right eye is not affected.  This appears to be monocular.  He also notes preceding this he had chewed on an item and had onset of pain in the region of the left lower teeth but that resolved fairly readily.  He does not have typical headaches.  He does not have a migraine history.  He associates no light sensitivity or nausea vomiting with this.     He has no associated weakness of arms or legs.  No changes in speech or swallowing.  No change in mentation.  Take is worse with him lying supine.  He has also drainage that's from the left knee are only that's clear.  No facial trauma.        I have reviewed the Medications, Allergies, Past Medical and Surgical History, and Social History in the Epic system.    Allergies:   Allergies   Allergen Reactions     Sulfa Drugs Rash     Rash on chest     Versed Other (See Comments)     agitation     Captopril Fatigue     dizziness     Cimetidine Other (See Comments)     Tagamet - reaction unknown     Lisinopril Other (See Comments) and Fatigue     dizziness         No current facility-administered medications on file prior to encounter.   Current Outpatient Prescriptions on File Prior to Encounter:  umeclidinium-vilanterol (ANORO ELLIPTA) 62.5-25 MCG/INH oral inhaler Inhale 1 puff into the lungs daily   losartan (COZAAR) 50 MG tablet Take 1 tablet (50 mg) by mouth daily   metoprolol (TOPROL-XL) 50 MG 24 hr tablet 100 mg in am, 50 mg in pm   simvastatin (ZOCOR) 20 MG tablet Take 1 tablet (20 mg) by mouth daily   glipiZIDE  (GLIPIZIDE XL) 10 MG 24 hr tablet Take 2 in the am   metFORMIN (GLUCOPHAGE) 500 MG tablet Take 1 tablet (500 mg) by mouth daily (with dinner)   nitroglycerin (NITROSTAT) 0.4 MG sublingual tablet Place 1 tablet (0.4 mg) under the tongue every 5 minutes as needed for chest pain   pregabalin (LYRICA) 50 MG capsule Take 2 in the am, 2 in the pm.   warfarin (COUMADIN) 2 MG tablet As directed by [facility]tico[facility] Clinic (current dose 2mg MF, 4mg rest of the week)   cefUROXime (CEFTIN) 500 MG tablet Take 1 tablet (500 mg) by mouth 2 times daily   ketoconazole (NIZORAL) 2 % shampoo Apply daily then rinse. Pt will call to order   ipratropium - albuterol 0.5 mg/2.5 mg/3 mL (DUONEB) 0.5-2.5 (3) MG/3ML nebulization Take 1 vial by nebulization every 6 hours as needed for shortness of breath / dyspnea or wheezing   predniSONE (DELTASONE) 10 MG tablet Take 10 mg by mouth daily   diphenhydrAMINE-acetaminophen (TYLENOL PM EXTRA STRENGTH)  MG tablet Take 1 tablet by mouth nightly as needed for sleep   acetaminophen (TYLENOL) 500 MG tablet Take 500 mg by mouth every 6 hours as needed for mild pain   albuterol (VENTOLIN HFA) 108 (90 BASE) MCG/ACT inhaler Inhale 2 puffs into the lungs every 4 hours as needed Pt will call to order   blood glucose (ACCU-CHEK COMPACT DRUM) test strip Use to test blood sugar three times daily or as directed.   ascorbic acid (VITAMIN C) 1000 MG TABS Take 1,000 mg by mouth daily In winter only   PSYLLIUM HUSK    guaiFENesin (MUCINEX) 600 MG 12 hr tablet Take 400-600 mg by mouth 2 times daily    glucose blood VI test strips strip Use as directed to check sugars twice a day   fexofenadine (ALLEGRA) 180 MG tablet Take 180 mg by mouth every evening    aspirin 81 MG tablet Take 81 mg by mouth every evening.   ACCU-CHEK SOFTCLIX LANCETS MISC use as directed to test blood sugars up to once daily; pt will call to order       Patient Active Problem List   Diagnosis     Coronary atherosclerosis     Essential  hypertension     Hemorrhage of rectum and anus     Allergic Rhinitis     AK (actinic keratosis)     HYPERLIPIDEMIA LDL GOAL <100     Ventral hernia     Colon polyp     Dysphonia     Senile nuclear sclerosis     Acute myocardial infarction of other specified sites, episode of care unspecified     Health Care Home     Atrial fibrillation (H)     Advance Care Planning     Edge's neuroma     COPD (chronic obstructive pulmonary disease) (H)     Long term current use of anticoagulant therapy     Hyperlipidemia with target LDL less than 100     Morbid obesity (H)     Type 2 diabetes mellitus with diabetic neuropathy (H)     Peripheral polyneuropathy (H)     Chronic bronchitis, unspecified chronic bronchitis type (H)     DJD (degenerative joint disease), cervical     Benign neoplasm of skin of trunk, except scrotum     Decubitus ulcer of buttock, stage 1, unspecified laterality       Past Surgical History:   Procedure Laterality Date     COLONOSCOPY  1/27/2011    COMBINED COLONOSCOPY, REMOVE TUMOR/POLYP/LESION BY SNARE performed by MARIUSZ ELIZALDE at [facility]     CORONARY ARTERY BYPASS  dec 2006     FLEXIBLE SIGMOIDOSCOPY  7/15/05     PHACOEMULSIFICATION WITH STANDARD INTRAOCULAR LENS IMPLANT  1/19/2012    Procedure:PHACOEMULSIFICATION WITH STANDARD INTRAOCULAR LENS IMPLANT; Right Kelman Phacoemulsification with introcular lens implant ; Surgeon:ROBY DORADO; Location:[facility]     PHACOEMULSIFICATION WITH STANDARD INTRAOCULAR LENS IMPLANT  2/2/2012    Procedure:PHACOEMULSIFICATION WITH STANDARD INTRAOCULAR LENS IMPLANT; Left Kelman Phacoemulsification with introcular lens implant ; Surgeon:ROBY DORADO; Location:[facility]     SURGICAL HISTORY OF -       chay     SURGICAL HISTORY OF -       choledochocystotomy     SURGICAL HISTORY OF -       lumbar     SURGICAL HISTORY OF -   8/94    colonoscopy       Social History   Substance Use Topics     Smoking status: Former Smoker     Packs/day: 1.00     Types: Cigarettes,  "Cigars     Quit date: 1/1/2006     Smokeless tobacco: Never Used      Comment: cigar once in a blue moon-quit again about 6 weeks ago     Alcohol use Yes      Comment: occasional       Most Recent Immunizations   Administered Date(s) Administered     Influenza (H1N1) 12/02/2009     Influenza (High Dose) 3 valent vaccine 08/31/2016     Influenza (IIV3) 09/27/2013     Pneumococcal (PCV 13) 09/03/2015     Pneumococcal 23 valent 12/07/2012     TD (ADULT, 7+) 07/15/2005     TDAP Vaccine (Adacel) 10/11/2012     Zoster vaccine, live 10/30/2013       BMI: Estimated body mass index is 35.58 kg/(m^2) as calculated from the following:    Height as of 8/3/17: 1.778 m (5' 10\").    Weight as of 8/3/17: 112.5 kg (248 lb).      Review of Systems   Constitutional: Negative for chills, diaphoresis and fever.   HENT: Negative for ear pain, sinus pressure and sore throat.    Eyes: Positive for visual disturbance.   Respiratory: Negative for cough, shortness of breath and wheezing.    Cardiovascular: Negative for chest pain and palpitations.   Gastrointestinal: Negative for abdominal pain, blood in stool, constipation, diarrhea, nausea and vomiting.   Genitourinary: Negative for dysuria, frequency and urgency.   Skin: Negative for rash.   Neurological: Positive for headaches.   All other systems reviewed and are negative.      Physical Exam   BP: (!) 135/108  Heart Rate: 84  Temp: 97.7  F (36.5  C)  Resp: 16  SpO2: 96 %  Physical Exam   Constitutional: He appears distressed.   HENT:   Right Ear: External ear normal.   Left Ear: External ear normal.   Mouth/Throat: Oropharynx is clear and moist.   Eyes: Conjunctivae and EOM are normal. Pupils are equal, round, and reactive to light.   Neck: Neck supple.   Cardiovascular: Exam reveals no gallop and no friction rub.    No murmur heard.  Pulmonary/Chest: Effort normal and breath sounds normal. No respiratory distress. He has no wheezes. He has no rales.   Abdominal: Soft. Bowel sounds are " normal. He exhibits no distension. There is no tenderness. There is no rebound and no guarding.   Musculoskeletal: He exhibits no edema.   Neurological: He is alert. No cranial nerve deficit. He exhibits normal muscle tone. Coordination (minimal LT finger-nose-finger incoordination) abnormal.   Skin: No rash noted. He is not diaphoretic.         1:58 PM - possibly some relief with the tetracaine for the eye pain.  Eye pressures in both eyes done with tetracaine  20 in the right eye and 20 in the left  Afrin was also instilled    Flourescein Stain was negative    No rash to suggest shingles.      LT Frontal sinus is tender percussion.        ED Course     ED Course     Procedures             Critical Care time:  none             Results for orders placed or performed during the hospital encounter of 08/11/17   CT Head w/o Contrast    Narrative    CT SCAN OF THE HEAD WITHOUT CONTRAST   8/11/2017 2:56 PM     HISTORY: Headache periorbital - severe, vision change limited to left  eye.    TECHNIQUE: Axial images of the head and coronal reformations without  IV contrast material. Radiation dose for this scan was reduced using  automated exposure control, adjustment of the mA and/or kV according  to patient size, or iterative reconstruction technique.    COMPARISON: CT sinuses 4/20/2010    FINDINGS: There is generalized atrophy of the brain. There is no  evidence of intracranial hemorrhage, mass, acute infarct or anomaly.     Left maxillary sinus and left ethmoid sinus are opacified and there is  an air-fluid level in the left frontal sinus. This is new since 2010.  There is no evidence of trauma.      Impression    IMPRESSION:  1. Brain atrophy, and otherwise normal CT scan of the brain.  2. Paranasal sinus mucosal thickening in left maxillary, ethmoid and  frontal sinuses with air-fluid level in the left frontal sinus  suggesting acute sinusitis.  3. No evidence of left orbital inflammation on this noncontrast  CT  scan.      BRIANNE STEWARD MD   Maxillofacial  CT w/o contrast    Narrative    CT SCAN OF THE PARANASAL SINUSES AND FACE  8/11/2017 3:02 PM     HISTORY: Severe periorbital pain - exclude sinus, mass.    TECHNIQUE: Radiation dose for this scan was reduced using automated  exposure control, adjustment of the mA and/or kV according to patient  size, or iterative reconstruction technique.  Noncontrast axial scans  and coronal and sagittal reformations.     COMPARISON: None.    FINDINGS:  Frontal sinuses:  Air-fluid level in the left frontal sinus suggesting  acute sinusitis. Obstructed frontal recess.    Ethmoid sinuses:  Moderate mucosal thickening in the anterior left  ethmoid air cells. Right ethmoid is clear.    Right maxillary sinus:  Minimal mucosal thickening and small cyst  inferiorly and anteriorly. Patent ostiomeatal unit.    Left maxillary sinus:  Complete opacification of the sinus and  obstruction of the ostiomeatal unit. Radiolucency around the roots of  the left upper second molar suggests radicular abscesses, and the  lateral one communicates with the sinuses seen on coronal image 38 of  series 10 and sagittal image 51 of series 14.    Sphenoid sinus:   Normal.     Nasal septum:  Deviated to the right.    Turbinates and nasal cavity:   Normal.     Laminae papyracea and cribriform plate: Normal.     Other findings: Orbits, sella, maxilla and nasopharynx appear normal.   Bony temporomandibular joints appear normal.      Impression    IMPRESSION:  Sinusitis involving left frontal, anterior ethmoid and  maxillary sinuses that appears to have risen from an infected left  upper second molar, with probable radicular abscess communicating with  the left maxillary sinus.    BRIANNE STEWARD MD   Basic metabolic panel   Result Value Ref Range    Sodium 136 133 - 144 mmol/L    Potassium 4.7 3.4 - 5.3 mmol/L    Chloride 106 94 - 109 mmol/L    Carbon Dioxide 25 20 - 32 mmol/L    Anion Gap 5 3 - 14 mmol/L    Glucose  201 (H) 70 - 99 mg/dL    Urea Nitrogen 17 7 - 30 mg/dL    Creatinine 0.93 0.66 - 1.25 mg/dL    GFR Estimate 81 >60 mL/min/1.7m2    GFR Estimate If Black >90   GFR Calc   >60 mL/min/1.7m2    Calcium 9.2 8.5 - 10.1 mg/dL   CBC with platelets differential   Result Value Ref Range    WBC 8.4 4.0 - 11.0 10e9/L    RBC Count 5.17 4.4 - 5.9 10e12/L    Hemoglobin 16.2 13.3 - 17.7 g/dL    Hematocrit 47.9 40.0 - 53.0 %    MCV 93 78 - 100 fl    MCH 31.3 26.5 - 33.0 pg    MCHC 33.8 31.5 - 36.5 g/dL    RDW 13.3 10.0 - 15.0 %    Platelet Count 201 150 - 450 10e9/L    Diff Method Automated Method     % Neutrophils 76.4 %    % Lymphocytes 11.7 %    % Monocytes 9.4 %    % Eosinophils 2.1 %    % Basophils 0.2 %    % Immature Granulocytes 0.2 %    Absolute Neutrophil 6.4 1.6 - 8.3 10e9/L    Absolute Lymphocytes 1.0 0.8 - 5.3 10e9/L    Absolute Monocytes 0.8 0.0 - 1.3 10e9/L    Absolute Eosinophils 0.2 0.0 - 0.7 10e9/L    Absolute Basophils 0.0 0.0 - 0.2 10e9/L    Abs Immature Granulocytes 0.0 0 - 0.4 10e9/L   INR   Result Value Ref Range    INR 2.32 (H) 0.86 - 1.14   CRP Inflammation   Result Value Ref Range    CRP Inflammation 53.5 (H) 0.0 - 8.0 mg/L   Erythrocyte sedimentation rate auto   Result Value Ref Range    Sed Rate 14 0 - 20 mm/h         Assessments & Plan (with Medical Decision Making)     MDM: Stef Bhatt is a 72 year old male is noted with significant headache and does not typically have headaches associated with vision change.,  complicated past history with atrial fibrillation on warfarin.  Evaluation included CRP and ESR as well as CT imaging of his head as well as sinuses.  Fluorescein stain of the eye reveals no lesions and eye pressures were normal.   I called to Dr. Minaya prior to CT imaging as he was going to be finishing eye clinic and dilated eye exam may be needed.   Prior to CT imaging being back, he saw his ophthalmologist for a dilated eye exam that was reassuring.  He returned and his  CT demonstrates pansinusitis on the left side that would easily explain his symptoms.  This would also explain the increased CRP to 56.  There appears to be a probable focus at the left upper molar which appears to be infected and is likely eroded into the sinus on the side.  He is nontoxic in appearance and has normal vital signs and is afebrile.  I started him on Augmentin as well as other agents as listed below.   I've asked him to follow up with dentist early next week.  May require ENT.  Precautions are given for return.    I have reviewed the nursing notes.    I have reviewed the findings, diagnosis, plan and need for follow up with the patient.       New Prescriptions    No medications on file       Final diagnoses:   Acute non-recurrent pansinusitis - augmentin twice daily for 14 days.  use nasal saline frequently. use guaifenesin 400 mg every 6 hours.  afrin for 3 days only.  return for fever, worsening.   Dental infection - make an appt for early next week   Left eye pain       8/11/2017   [facility] EMERGENCY DEPARTMENT     Sonu Calvert MD  08/11/17 1559       Sonu Calvert MD  08/11/17 1551

## 2017-08-11 NOTE — ED AVS SNAPSHOT
[facility]    [address] [place] [id number]    Phone:  [phone]    Fax:  [phone]                                       Stef Bhatt   MRN: [id number]    Department:  [facility]   Date of Visit:  8/11/2017           Patient Information     Date Of Birth          1945        Your diagnoses for this visit were:     Acute non-recurrent pansinusitis augmentin twice daily for 14 days.  use nasal saline frequently. use guaifenesin 400 mg every 6 hours.  afrin for 3 days only.  return for fever, worsening.    Dental infection make an appt for early next week    Left eye pain        You were seen by Sonu Calvert MD.      Follow-up Information     Follow up with Marcelino Foster MD In 1 week.    Specialty:  Family Practice    Contact information:    [address] [place] [address]  [phone]          Follow up with [facility].    Specialty:  EMERGENCY MEDICINE    Why:  As needed, If symptoms worsen    Contact information:    [address] [place] [id number] [phone]    Additional information:    The medical center is located at   [address] [place] [address] (between [facility] and   [facility] in [place], [place] miles north   of [place]).        Schedule an appointment as soon as possible for a visit with dentist.        Discharge Instructions         ICD-10-CM    1. Acute non-recurrent pansinusitis J01.40     augmentin twice daily for 14 days.  use nasal saline frequently. use guaifenesin 400 mg every 6 hours.  afrin for 3 days only.  return for fever, worsening.   2. Dental infection K04.7     make an appt for early next week     Treating Upper Respiratory Infections and Sinus Infections    START WITH OVER-THE-COUNTER TOPICAL MEDICATIONS  Saline Nasal Spray (Deep Sea or Ocean)   Effective decongestant without any systemic side effects   Use this as frequently as every 10-15 minutes   Start use  in the morning with 3 minutes of constant infusion    Oxymetazoline (Afrin)   Used twice daily for moderate to severe relief of sinus headache   Do not exceed 3 days of use       Prolonged use results in a chronic runny nose    CONSIDER ADDING OVER-THE-COUNTER ORAL MEDICATIONS  Guaifenesin (Robitussin, over-the-counter)   Guaifenesin is an expectorant that helps promote sinus drainage   Guaifenesin may cause a dry mouth sensation    Stay hydrated and consider sucking candies   Guaifenesin will increase coughing temporarily    Increases sinus drainage and is also a cough expectorant   Adult dosing is 400 mg of the short-acting Guaifenesin every 4 hours   Mucinex (12 hour preparation of high dose Guaifenesin alone)    Ibuprofen (Advil, Motrin)   Helps relieve the sinus headache, lower fever, relieve muscle aches   Adults may use 600 mg orally every 6 hours as needed with food   Children should not exceed 10 mg per kilogram every 6 hours    ANTIBIOTICS   Upper respiratory infections nearly always start as viral infections   Bacteria do not super-infect sinuses until after 10-14 days   Antibiotics are only indicated if symptoms last more than 10-14 days    THE SEASONAL ALLERGY TO SINUS INFECTION CONNECTION  Frequent sinus infections are often due to underlying seasonal allergies  Allergy control may prevent sinus infections      Consider starting a nasal steroid inhaler (prescription only)  Antihistamines may dry-up sinus drainage and worsen sinusitis       Avoid antihistamines unless sinusitus is unlikely      Sinusitis (Antibiotic Treatment)    The sinuses are air-filled spaces within the bones of the face. They connect to the inside of the nose. Sinusitis is an inflammation of the tissue lining the sinus cavity. Sinus inflammation can occur during a cold. It can also be due to allergies to pollens and other particles in the air. Sinusitis can cause symptoms of sinus congestion and fullness. A sinus infection causes  fever, headache and facial pain. There is often green or yellow drainage from the nose or into the back of the throat (post-nasal drip). You have been given antibiotics to treat this condition.  Home care:    Take the full course of antibiotics as instructed. Do not stop taking them, even if you feel better.    Drink plenty of water, hot tea, and other liquids. This may help thin mucus. It also may promote sinus drainage.    Heat may help soothe painful areas of the face. Use a towel soaked in hot water. Or,  the shower and direct the hot spray onto your face. Using a vaporizer along with a menthol rub at night may also help.     An expectorant containing guaifenesin may help thin the mucus and promote drainage from the sinuses.    Over-the-counter decongestants may be used unless a similar medicine was prescribed. Nasal sprays work the fastest. Use one that contains phenylephrine or oxymetazoline. First blow the nose gently. Then use the spray. Do not use these medicines more often than directed on the label or symptoms may get worse. You may also use tablets containing pseudoephedrine. Avoid products that combine ingredients, because side effects may be increased. Read labels. You can also ask the pharmacist for help. (NOTE: Persons with high blood pressure should not use decongestants. They can raise blood pressure.)    Over-the-counter antihistamines may help if allergies contributed to your sinusitis.      Do not use nasal rinses or irrigation during an acute sinus infection, unless told to by your health care provider. Rinsing may spread the infection to other sinuses.    Use acetaminophen or ibuprofen to control pain, unless another pain medicine was prescribed. (If you have chronic liver or kidney disease or ever had a stomach ulcer, talk with your doctor before using these medicines. Aspirin should never be used in anyone under 18 years of age who is ill with a fever. It may cause severe liver  damage.)    Don't smoke. This can worsen symptoms.  Follow-up care  Follow up with your healthcare provider or our staff if you are not improving within the next week.  When to seek medical advice  Call your healthcare provider if any of these occur:    Facial pain or headache becoming more severe    Stiff neck    Unusual drowsiness or confusion    Swelling of the forehead or eyelids    Vision problems, including blurred or double vision    Fever of 100.4 F (38 C) or higher, or as directed by your healthcare provider    Seizure    Breathing problems    Symptoms not resolving within 10 days  Date Last Reviewed: 4/13/2015 2000-2017 [employer]. [address] [place], [place] [id number]. All rights reserved. This information is not intended as a substitute for professional medical care. Always follow your healthcare professional's instructions.          Dental Abscess    An abscess is a pocket of pus at the tip of a tooth root in your jaw bone. It is caused by an infection at the root of the tooth. It can cause pain and swelling of the gum, cheek, or jaw. Pain may spread from the tooth to your ear or the area of your jaw on the same side. If the abscess isn t treated, it appears as a bubble or swelling on the gum near the tooth. The pressure that builds in this swelling is the source of the pain. More serious infections cause your face to swell.  An abscess can be caused by a crack in the tooth, a cavity, a gum infection, or a combination of these. Once the pulp of the tooth is exposed, bacteria can spread down the roots to the tip. If the bacteria are not stopped, they can damage the bone and soft tissue, and an abscess can form.  Home care  Follow these guidelines when caring for yourself at home:    Avoid hot and cold foods and drinks. Your tooth may be sensitive to changes in temperature. Don t chew on the side of the infected tooth.    If your tooth is chipped or cracked, or if there is a large  "open cavity, put oil of cloves directly on the tooth to relieve pain. You can buy oil of cloves at drugstores. Some pharmacies carry an over-the-counter \"toothache kit.\" This contains a paste that you can put on the exposed tooth to make it less sensitive.    Put a cold pack on your jaw over the sore area to help reduce pain.    You may use over-the-counter medicine to ease pain, unless another medicine was prescribed. If you have chronic liver or kidney disease, talk with your healthcare provider before using acetaminophen or ibuprofen. Also talk with your provider if you ve had a stomach ulcer or GI bleeding.    An antibiotic will be prescribed. Take it until finished, even if you are feeling better after a few days.  Follow-up care  Follow up with your dentist or an oral surgeon, or as advised. Once an infection occurs in a tooth, it will continue to be a problem until the infection is drained. This is done through surgery or a root canal. Or you may need to have your tooth pulled.  Call 911  Call 911 if any of these occur:    Unusual drowsiness    Headache or stiff neck    Weakness or fainting    Difficulty swallowing, breathing, or opening your mouth    Swollen eyelids  When to seek medical advice  Call your healthcare provider right away if any of these occur:    Your face becomes more swollen or red    Pain gets worse or spreads to your neck    Fever of 100.4  F (38.0  C) or higher, or as directed by your healthcare provider    Pus drains from the tooth  Date Last Reviewed: 10/1/2016    7260-1339 [employer]. [address] [place] [address], [place] [address]. All rights reserved. This information is not intended as a substitute for professional medical care. Always follow your healthcare professional's instructions.          Future Appointments        Provider Department Dept Phone Center    9/12/2017 10:30 AM [facility] [phone] [facility]    9/14/2017 10:00 AM Marcelino Foster, " MD [facility] [phone] [facility]    10/5/2017 11:15 AM [place] [facility] provider, [facility] [phone] [facility]      24 Hour Appointment Hotline       To make an appointment at any [facility], call [phone] ([phone]). If you don't have a family doctor or clinic, we will help you find one. [facility] are conveniently located to serve the needs of you and your family.             Review of your medicines      START taking        Dose / Directions Last dose taken    amoxicillin-clavulanate 875-125 MG per tablet   Commonly known as:  AUGMENTIN   Dose:  1 tablet   Quantity:  28 tablet        Take 1 tablet by mouth 2 times daily for 14 days   Refills:  0          Our records show that you are taking the medicines listed below. If these are incorrect, please call your family doctor or clinic.        Dose / Directions Last dose taken    albuterol 108 (90 BASE) MCG/ACT Inhaler   Commonly known as:  VENTOLIN HFA   Dose:  2 puff   Quantity:  1 Inhaler        Inhale 2 puffs into the lungs every 4 hours as needed Pt will call to order   Refills:  11        ALLEGRA 180 MG tablet   Dose:  180 mg   Quantity:  30 tablet   Generic drug:  fexofenadine        Take 180 mg by mouth every evening   Refills:  1        ascorbic acid 1000 MG Tabs   Commonly known as:  vitamin C   Dose:  1000 mg        Take 1,000 mg by mouth daily In winter only   Refills:  0        aspirin 81 MG tablet   Dose:  81 mg   Quantity:  1 tablet        Take 81 mg by mouth every evening.   Refills:  3        blood glucose monitoring lancets   Quantity:  100 Each        use as directed to test blood sugars up to once daily; pt will call to order   Refills:  11        * blood glucose monitoring test strip   Commonly known as:  no brand specified   Quantity:  3 Month        Use as directed to check sugars twice a day   Refills:  3        * blood glucose monitoring test strip   Commonly known as:  ACCU-CHEK  COMPACT DRUM   Quantity:  270 strip        Use to test blood sugar three times daily or as directed.   Refills:  1        cefuroxime 500 MG tablet   Commonly known as:  CEFTIN   Dose:  500 mg   Indication:  As needed   Quantity:  20 tablet        Take 1 tablet (500 mg) by mouth 2 times daily   Refills:  3        glipiZIDE 10 MG 24 hr tablet   Commonly known as:  glipiZIDE XL   Quantity:  180 tablet        Take 2 in the am   Refills:  3        ipratropium - albuterol 0.5 mg/2.5 mg/3 mL 0.5-2.5 (3) MG/3ML neb solution   Commonly known as:  DUONEB   Dose:  1 vial        Take 1 vial by nebulization every 6 hours as needed for shortness of breath / dyspnea or wheezing   Refills:  0        ketoconazole 2 % shampoo   Commonly known as:  NIZORAL   Quantity:  120 mL        Apply daily then rinse. Pt will call to order   Refills:  11        losartan 50 MG tablet   Commonly known as:  COZAAR   Dose:  50 mg   Quantity:  90 tablet        Take 1 tablet (50 mg) by mouth daily   Refills:  3        metFORMIN 500 MG tablet   Commonly known as:  GLUCOPHAGE   Dose:  500 mg   Quantity:  90 tablet        Take 1 tablet (500 mg) by mouth daily (with dinner)   Refills:  3        metoprolol 50 MG 24 hr tablet   Commonly known as:  TOPROL-XL   Quantity:  270 tablet        100 mg in am, 50 mg in pm   Refills:  3        MUCINEX 600 MG 12 hr tablet   Dose:  400-600 mg   Generic drug:  guaiFENesin        Take 400-600 mg by mouth 2 times daily   Refills:  0        nitroGLYcerin 0.4 MG sublingual tablet   Commonly known as:  NITROSTAT   Dose:  0.4 mg   Quantity:  25 tablet        Place 1 tablet (0.4 mg) under the tongue every 5 minutes as needed for chest pain   Refills:  11        predniSONE 10 MG tablet   Commonly known as:  DELTASONE   Dose:  10 mg        Take 10 mg by mouth daily   Refills:  0        pregabalin 50 MG capsule   Commonly known as:  LYRICA   Quantity:  120 capsule        Take 2 in the am, 2 in the pm.   Refills:  5        PSYLLIUM  HUSK        Refills:  0        simvastatin 20 MG tablet   Commonly known as:  ZOCOR   Dose:  20 mg   Quantity:  90 tablet        Take 1 tablet (20 mg) by mouth daily   Refills:  3        TYLENOL 500 MG tablet   Dose:  500 mg   Generic drug:  acetaminophen        Take 500 mg by mouth every 6 hours as needed for mild pain   Refills:  0        TYLENOL PM EXTRA STRENGTH  MG tablet   Dose:  1 tablet   Generic drug:  diphenhydrAMINE-acetaminophen        Take 1 tablet by mouth nightly as needed for sleep   Refills:  0        umeclidinium-vilanterol 62.5-25 MCG/INH oral inhaler   Commonly known as:  ANORO ELLIPTA   Dose:  1 puff        Inhale 1 puff into the lungs daily   Refills:  0        warfarin 2 MG tablet   Commonly known as:  COUMADIN   Quantity:  150 tablet        As directed by Anticoagulation Clinic (current dose 2mg MF, 4mg rest of the week)   Refills:  3        * Notice:  This list has 2 medication(s) that are the same as other medications prescribed for you. Read the directions carefully, and ask your doctor or other care provider to review them with you.            Prescriptions were sent or printed at these locations (1 Prescription)                   [facility] - [address] [address]    Telephone:  [phone]   Fax:  [phone]   Hours:                  E-Prescribed (1 of 1)         amoxicillin-clavulanate (AUGMENTIN) 875-125 MG per tablet                Procedures and tests performed during your visit     Basic metabolic panel    CBC with platelets differential    CRP Inflammation    CT Head w/o Contrast    Erythrocyte sedimentation rate auto    INR    Maxillofacial  CT w/o contrast      Orders Needing Specimen Collection     None      Pending Results     No orders found from 8/9/2017 to 8/12/2017.            Pending Culture Results     No orders found from 8/9/2017 to 8/12/2017.            Pending Results Instructions     If you had  any lab results that were not finalized at the time of your Discharge, you can call the ED Lab Result RN at [phone]. You will be contacted by this team for any positive Lab results or changes in treatment. The nurses are available 7 days a week from 10A to 6:30P.  You can leave a message 24 hours per day and they will return your call.        Test Results From Your Hospital Stay        8/11/2017  3:45 PM      Narrative     CT SCAN OF THE HEAD WITHOUT CONTRAST   8/11/2017 2:56 PM     HISTORY: Headache periorbital - severe, vision change limited to left  eye.    TECHNIQUE: Axial images of the head and coronal reformations without  IV contrast material. Radiation dose for this scan was reduced using  automated exposure control, adjustment of the mA and/or kV according  to patient size, or iterative reconstruction technique.    COMPARISON: CT sinuses 4/20/2010    FINDINGS: There is generalized atrophy of the brain. There is no  evidence of intracranial hemorrhage, mass, acute infarct or anomaly.     Left maxillary sinus and left ethmoid sinus are opacified and there is  an air-fluid level in the left frontal sinus. This is new since 2010.  There is no evidence of trauma.        Impression     IMPRESSION:  1. Brain atrophy, and otherwise normal CT scan of the brain.  2. Paranasal sinus mucosal thickening in left maxillary, ethmoid and  frontal sinuses with air-fluid level in the left frontal sinus  suggesting acute sinusitis.  3. No evidence of left orbital inflammation on this noncontrast CT  scan.      BRIANNE STEWARD MD         8/11/2017  3:45 PM      Narrative     CT SCAN OF THE PARANASAL SINUSES AND FACE  8/11/2017 3:02 PM     HISTORY: Severe periorbital pain - exclude sinus, mass.    TECHNIQUE: Radiation dose for this scan was reduced using automated  exposure control, adjustment of the mA and/or kV according to patient  size, or iterative reconstruction technique.  Noncontrast axial scans  and coronal and  sagittal reformations.     COMPARISON: None.    FINDINGS:  Frontal sinuses:  Air-fluid level in the left frontal sinus suggesting  acute sinusitis. Obstructed frontal recess.    Ethmoid sinuses:  Moderate mucosal thickening in the anterior left  ethmoid air cells. Right ethmoid is clear.    Right maxillary sinus:  Minimal mucosal thickening and small cyst  inferiorly and anteriorly. Patent ostiomeatal unit.    Left maxillary sinus:  Complete opacification of the sinus and  obstruction of the ostiomeatal unit. Radiolucency around the roots of  the left upper second molar suggests radicular abscesses, and the  lateral one communicates with the sinuses seen on coronal image 38 of  series 10 and sagittal image 51 of series 14.    Sphenoid sinus:   Normal.     Nasal septum:  Deviated to the right.    Turbinates and nasal cavity:   Normal.     Laminae papyracea and cribriform plate: Normal.     Other findings: Orbits, sella, maxilla and nasopharynx appear normal.   Bony temporomandibular joints appear normal.        Impression     IMPRESSION:  Sinusitis involving left frontal, anterior ethmoid and  maxillary sinuses that appears to have risen from an infected left  upper second molar, with probable radicular abscess communicating with  the left maxillary sinus.    BRIANNE STEWARD MD         8/11/2017  2:13 PM      Component Results     Component Value Ref Range & Units Status    Sodium 136 133 - 144 mmol/L Final    Potassium 4.7 3.4 - 5.3 mmol/L Final    Chloride 106 94 - 109 mmol/L Final    Carbon Dioxide 25 20 - 32 mmol/L Final    Anion Gap 5 3 - 14 mmol/L Final    Glucose 201 (H) 70 - 99 mg/dL Final    Urea Nitrogen 17 7 - 30 mg/dL Final    Creatinine 0.93 0.66 - 1.25 mg/dL Final    GFR Estimate 81 >60 mL/min/1.7m2 Final    Non  GFR Calc    GFR Estimate If Black >90   GFR Calc   >60 mL/min/1.7m2 Final    Calcium 9.2 8.5 - 10.1 mg/dL Final         8/11/2017  1:58 PM      Component Results   "   Component Value Ref Range & Units Status    WBC 8.4 4.0 - 11.0 10e9/L Final    RBC Count 5.17 4.4 - 5.9 10e12/L Final    Hemoglobin 16.2 13.3 - 17.7 g/dL Final    Hematocrit 47.9 40.0 - 53.0 % Final    MCV 93 78 - 100 fl Final    MCH 31.3 26.5 - 33.0 pg Final    MCHC 33.8 31.5 - 36.5 g/dL Final    RDW 13.3 10.0 - 15.0 % Final    Platelet Count 201 150 - 450 10e9/L Final    Diff Method Automated Method  Final    % Neutrophils 76.4 % Final    % Lymphocytes 11.7 % Final    % Monocytes 9.4 % Final    % Eosinophils 2.1 % Final    % Basophils 0.2 % Final    % Immature Granulocytes 0.2 % Final    Absolute Neutrophil 6.4 1.6 - 8.3 10e9/L Final    Absolute Lymphocytes 1.0 0.8 - 5.3 10e9/L Final    Absolute Monocytes 0.8 0.0 - 1.3 10e9/L Final    Absolute Eosinophils 0.2 0.0 - 0.7 10e9/L Final    Absolute Basophils 0.0 0.0 - 0.2 10e9/L Final    Abs Immature Granulocytes 0.0 0 - 0.4 10e9/L Final         8/11/2017  2:04 PM      Component Results     Component Value Ref Range & Units Status    INR 2.32 (H) 0.86 - 1.14 Final         8/11/2017  2:13 PM      Component Results     Component Value Ref Range & Units Status    CRP Inflammation 53.5 (H) 0.0 - 8.0 mg/L Final         8/11/2017  2:10 PM      Component Results     Component Value Ref Range & Units Status    Sed Rate 14 0 - 20 mm/h Final                Thank you for choosing [facility]       Thank you for choosing [facility] for your care. Our goal is always to provide you with excellent care. Hearing back from our patients is one way we can continue to improve our services. Please take a few minutes to complete the written survey that you may receive in the mail after you visit with us. Thank you!        [employer] Information     [employer] lets you send messages to your doctor, view your test results, renew your prescriptions, schedule appointments and more. To sign up, go to [email or web address] . Click on \"Log in\" on the left side of the screen, which will take you to the " "Welcome page. Then click on \"Sign up Now\" on the right side of the page.     You will be asked to enter the access code listed below, as well as some personal information. Please follow the directions to create your username and password.     Your access code is: [email or web address]  Expires: 10/17/2017 11:47 AM     Your access code will  in 90 days. If you need help or a new code, please call your [place] clinic or [phone].        Care EveryWhere ID     This is your Care EveryWhere ID. This could be used by other organizations to access your [place] medical records  [id number]        Equal Access to Services     BARBI ROMERO : Bart Raza, reza jasso, lang recinos, susanne padilla. So [facility] [phone].    ATENCIÓN: Si habla español, tiene a chau disposición servicios gratuitos de asistencia lingüística. Llame al [phone].    We comply with applicable federal civil rights laws and [place] laws. We do not discriminate on the basis of race, color, national origin, age, disability sex, sexual orientation or gender identity.            After Visit Summary       This is your record. Keep this with you and show to your community pharmacist(s) and doctor(s) at your next visit.                  "

## 2017-08-11 NOTE — ED AVS SNAPSHOT
[facility] Emergency Department    [address] [address]    Phone:  [phone]    Fax:  [phone]                                       Stef Bhatt   MRN: [id number]    Department:  [facility] Emergency Department   Date of Visit:  8/11/2017           After Visit Summary Signature Page     I have received my discharge instructions, and my questions have been answered. I have discussed any challenges I see with this plan with the nurse or doctor.    ..........................................................................................................................................  Patient/Patient Representative Signature      ..........................................................................................................................................  Patient Representative Print Name and Relationship to Patient    ..................................................               ................................................  Date                                            Time    ..........................................................................................................................................  Reviewed by Signature/Title    ...................................................              ..............................................  Date                                                            Time

## 2017-08-11 NOTE — DISCHARGE INSTRUCTIONS
ICD-10-CM    1. Acute non-recurrent pansinusitis J01.40     augmentin twice daily for 14 days.  use nasal saline frequently. use guaifenesin 400 mg every 6 hours.  afrin for 3 days only.  return for fever, worsening.   2. Dental infection K04.7     make an appt for early next week     Treating Upper Respiratory Infections and Sinus Infections    START WITH OVER-THE-COUNTER TOPICAL MEDICATIONS  Saline Nasal Spray (Deep Sea or Ocean)   Effective decongestant without any systemic side effects   Use this as frequently as every 10-15 minutes   Start use in the morning with 3 minutes of constant infusion    Oxymetazoline (Afrin)   Used twice daily for moderate to severe relief of sinus headache   Do not exceed 3 days of use       Prolonged use results in a chronic runny nose    CONSIDER ADDING OVER-THE-COUNTER ORAL MEDICATIONS  Guaifenesin (Robitussin, over-the-counter)   Guaifenesin is an expectorant that helps promote sinus drainage   Guaifenesin may cause a dry mouth sensation    Stay hydrated and consider sucking candies   Guaifenesin will increase coughing temporarily    Increases sinus drainage and is also a cough expectorant   Adult dosing is 400 mg of the short-acting Guaifenesin every 4 hours   Mucinex (12 hour preparation of high dose Guaifenesin alone)    Ibuprofen (Advil, Motrin)   Helps relieve the sinus headache, lower fever, relieve muscle aches   Adults may use 600 mg orally every 6 hours as needed with food   Children should not exceed 10 mg per kilogram every 6 hours    ANTIBIOTICS   Upper respiratory infections nearly always start as viral infections   Bacteria do not super-infect sinuses until after 10-14 days   Antibiotics are only indicated if symptoms last more than 10-14 days    THE SEASONAL ALLERGY TO SINUS INFECTION CONNECTION  Frequent sinus infections are often due to underlying seasonal allergies  Allergy control may prevent sinus infections      Consider starting a nasal steroid inhaler  (prescription only)  Antihistamines may dry-up sinus drainage and worsen sinusitis       Avoid antihistamines unless sinusitus is unlikely      Sinusitis (Antibiotic Treatment)    The sinuses are air-filled spaces within the bones of the face. They connect to the inside of the nose. Sinusitis is an inflammation of the tissue lining the sinus cavity. Sinus inflammation can occur during a cold. It can also be due to allergies to pollens and other particles in the air. Sinusitis can cause symptoms of sinus congestion and fullness. A sinus infection causes fever, headache and facial pain. There is often green or yellow drainage from the nose or into the back of the throat (post-nasal drip). You have been given antibiotics to treat this condition.  Home care:    Take the full course of antibiotics as instructed. Do not stop taking them, even if you feel better.    Drink plenty of water, hot tea, and other liquids. This may help thin mucus. It also may promote sinus drainage.    Heat may help soothe painful areas of the face. Use a towel soaked in hot water. Or,  the shower and direct the hot spray onto your face. Using a vaporizer along with a menthol rub at night may also help.     An expectorant containing guaifenesin may help thin the mucus and promote drainage from the sinuses.    Over-the-counter decongestants may be used unless a similar medicine was prescribed. Nasal sprays work the fastest. Use one that contains phenylephrine or oxymetazoline. First blow the nose gently. Then use the spray. Do not use these medicines more often than directed on the label or symptoms may get worse. You may also use tablets containing pseudoephedrine. Avoid products that combine ingredients, because side effects may be increased. Read labels. You can also ask the pharmacist for help. (NOTE: Persons with high blood pressure should not use decongestants. They can raise blood pressure.)    Over-the-counter antihistamines may  help if allergies contributed to your sinusitis.      Do not use nasal rinses or irrigation during an acute sinus infection, unless told to by your health care provider. Rinsing may spread the infection to other sinuses.    Use acetaminophen or ibuprofen to control pain, unless another pain medicine was prescribed. (If you have chronic liver or kidney disease or ever had a stomach ulcer, talk with your doctor before using these medicines. Aspirin should never be used in anyone under 18 years of age who is ill with a fever. It may cause severe liver damage.)    Don't smoke. This can worsen symptoms.  Follow-up care  Follow up with your healthcare provider or our staff if you are not improving within the next week.  When to seek medical advice  Call your healthcare provider if any of these occur:    Facial pain or headache becoming more severe    Stiff neck    Unusual drowsiness or confusion    Swelling of the forehead or eyelids    Vision problems, including blurred or double vision    Fever of 100.4 F (38 C) or higher, or as directed by your healthcare provider    Seizure    Breathing problems    Symptoms not resolving within 10 days  Date Last Reviewed: 4/13/2015 2000-2017 [employer]. [address] [place] [address]. All rights reserved. This information is not intended as a substitute for professional medical care. Always follow your healthcare professional's instructions.          Dental Abscess    An abscess is a pocket of pus at the tip of a tooth root in your jaw bone. It is caused by an infection at the root of the tooth. It can cause pain and swelling of the gum, cheek, or jaw. Pain may spread from the tooth to your ear or the area of your jaw on the same side. If the abscess isn t treated, it appears as a bubble or swelling on the gum near the tooth. The pressure that builds in this swelling is the source of the pain. More serious infections cause your face to swell.  An abscess  "can be caused by a crack in the tooth, a cavity, a gum infection, or a combination of these. Once the pulp of the tooth is exposed, bacteria can spread down the roots to the tip. If the bacteria are not stopped, they can damage the bone and soft tissue, and an abscess can form.  Home care  Follow these guidelines when caring for yourself at home:    Avoid hot and cold foods and drinks. Your tooth may be sensitive to changes in temperature. Don t chew on the side of the infected tooth.    If your tooth is chipped or cracked, or if there is a large open cavity, put oil of cloves directly on the tooth to relieve pain. You can buy oil of cloves at drugstores. Some pharmacies carry an over-the-counter \"toothache kit.\" This contains a paste that you can put on the exposed tooth to make it less sensitive.    Put a cold pack on your jaw over the sore area to help reduce pain.    You may use over-the-counter medicine to ease pain, unless another medicine was prescribed. If you have chronic liver or kidney disease, talk with your healthcare provider before using acetaminophen or ibuprofen. Also talk with your provider if you ve had a stomach ulcer or GI bleeding.    An antibiotic will be prescribed. Take it until finished, even if you are feeling better after a few days.  Follow-up care  Follow up with your dentist or an oral surgeon, or as advised. Once an infection occurs in a tooth, it will continue to be a problem until the infection is drained. This is done through surgery or a root canal. Or you may need to have your tooth pulled.  Call 911  Call 911 if any of these occur:    Unusual drowsiness    Headache or stiff neck    Weakness or fainting    Difficulty swallowing, breathing, or opening your mouth    Swollen eyelids  When to seek medical advice  Call your healthcare provider right away if any of these occur:    Your face becomes more swollen or red    Pain gets worse or spreads to your neck    Fever of 100.4  F " (38.0  C) or higher, or as directed by your healthcare provider    Pus drains from the tooth  Date Last Reviewed: 10/1/2016    8078-7076 [employer] [address] [place] [address], [place] [address]. All rights reserved. This information is not intended as a substitute for professional medical care. Always follow your healthcare professional's instructions.

## 2017-08-11 NOTE — ED NOTES
HA X 4 DAYS WITH PAIN BEHIND LEFT EYE AND ACROSS FOREHEAD - PAIN WORSE LAYING FLAT - 24 HR BEFORE BAER BEGAN PT NOTICED EYES NOT TRACKING NORMALLY AND MAKING IT DIFFICULT TO READ - PT ALERT AND ORIENTED AND BEING EXAMINED BY DOCTOR

## 2017-08-25 ENCOUNTER — ANTICOAGULATION THERAPY VISIT (OUTPATIENT)
Dept: ANTICOAGULATION | Facility: CLINIC | Age: 72
End: 2017-08-25
Payer: MEDICARE

## 2017-08-25 DIAGNOSIS — I48.91 ATRIAL FIBRILLATION (H): Primary | ICD-10-CM

## 2017-08-25 DIAGNOSIS — Z79.01 LONG TERM CURRENT USE OF ANTICOAGULANT THERAPY: ICD-10-CM

## 2017-08-25 LAB — INR POINT OF CARE: 2.3 (ref 0.86–1.14)

## 2017-08-25 PROCEDURE — 85610 PROTHROMBIN TIME: CPT | Mod: QW

## 2017-08-25 PROCEDURE — 36416 COLLJ CAPILLARY BLOOD SPEC: CPT

## 2017-08-25 PROCEDURE — 99207 ZZC NO CHARGE NURSE ONLY: CPT

## 2017-08-25 NOTE — MR AVS SNAPSHOT
Stef Bhatt   8/25/2017 11:00 AM   Anticoagulation Therapy Visit    Description:  72 year old male   Provider:  [facility]   Department:  [facility]           INR as of 8/25/2017     Today's INR 2.3      Anticoagulation Summary as of 8/25/2017     INR goal 2.0-3.0   Today's INR 2.3   Full instructions 8/26: 3 mg; 8/27: 3 mg; Otherwise 2 mg on Mon, Fri; 4 mg all other days   Next INR check 8/28/2017    Indications   Atrial fibrillation (H) [I48.91]  Long term current use of anticoagulant therapy [Z79.01]         Description     Take 2 mg today. Saturday and Sunday have 1.5 tablets.      Your next Anticoagulation Clinic appointment(s)     Aug 28, 2017  9:30 AM CDT   [facility] with [facility]   [facility]    [address] [facility] [address]   [phone]            Oct 05, 2017 11:15 AM CDT   Anticoagulation Visit with [facility]   [facility]    [address] [facility] [address]   [phone]              Contact Numbers     Please call [phone] to cancel and/or reschedule your appointment.  Please call [phone] with any problems or questions regarding your therapy          August 2017 Details    Sun Mon Tue Wed Thu Fri Sat       1               2               3               4               5                 6               7               8               9               10               11               12                 13               14               15               16               17               18               19                 20               21               22               23               24               25      2 mg   See details      26      3 mg           27      3 mg         28            29               30               31                  Date Details   08/25 This INR check       Date of next INR:  8/28/2017         How to take your warfarin  dose     To take:  2 mg Take 1 of the 2 mg tablets.    To take:  3 mg Take 1.5 of the 2 mg tablets.

## 2017-08-25 NOTE — PROGRESS NOTES
ANTICOAGULATION FOLLOW-UP CLINIC VISIT    Patient Name:  Stef Bhatt  Date:  8/25/2017  Contact Type:  Face to Face    SUBJECTIVE:     Patient Findings     Positives Dental/Other procedures (having tooth extraction on 8-28 and needs INR 2.0 or less)    Comments Patient usually eats mixed salads 3 times a week. He will have one each on Sat/Sun. Will recheck INR on 8-28 prior to dental extraction. [facility] is faxing requirements to [facility] per RODNEY Orozco.           OBJECTIVE    INR Protime   Date Value Ref Range Status   08/25/2017 2.3 (A) 0.86 - 1.14 Final       ASSESSMENT / PLAN  INR assessment THER    Recheck INR In: 3 DAYS    INR Location Clinic      Anticoagulation Summary as of 8/25/2017     INR goal 2.0-3.0   Today's INR 2.3   Maintenance plan 2 mg (2 mg x 1) on Mon, Fri; 4 mg (2 mg x 2) all other days   Full instructions 8/26: 3 mg; 8/27: 3 mg; Otherwise 2 mg on Mon, Fri; 4 mg all other days   Weekly total 24 mg   Plan last modified Noelle Harrington RN (4/6/2015)   Next INR check 8/28/2017   Priority INR   Target end date Indefinite    Indications   Atrial fibrillation (H) [I48.91]  Long term current use of anticoagulant therapy [Z79.01]         Anticoagulation Episode Summary     INR check location     Preferred lab     Send INR reminders to [facility]    Comments * warfarin 2 mg tabs. Dr Shelley Lennox is pulmonologist. Likes 9 week rechecks      Anticoagulation Care Providers     Provider Role Specialty Phone number    Marcelino Foster MD [facility] [phone]            See the Encounter Report to view Anticoagulation Flowsheet and Dosing Calendar (Go to Encounters tab in chart review, and find the Anticoagulation Therapy Visit)        Bernarda Roa RN

## 2017-08-28 ENCOUNTER — ANTICOAGULATION THERAPY VISIT (OUTPATIENT)
Dept: ANTICOAGULATION | Facility: CLINIC | Age: 72
End: 2017-08-28
Payer: MEDICARE

## 2017-08-28 DIAGNOSIS — I48.91 ATRIAL FIBRILLATION (H): ICD-10-CM

## 2017-08-28 DIAGNOSIS — Z79.01 LONG TERM CURRENT USE OF ANTICOAGULANT THERAPY: ICD-10-CM

## 2017-08-28 LAB — INR POINT OF CARE: 2.1 (ref 0.86–1.14)

## 2017-08-28 PROCEDURE — 36416 COLLJ CAPILLARY BLOOD SPEC: CPT

## 2017-08-28 PROCEDURE — 99207 ZZC NO CHARGE NURSE ONLY: CPT

## 2017-08-28 PROCEDURE — 85610 PROTHROMBIN TIME: CPT | Mod: QW

## 2017-08-28 NOTE — PROGRESS NOTES
ANTICOAGULATION FOLLOW-UP CLINIC VISIT    Patient Name:  Stef Bhatt  Date:  8/28/2017  Contact Type:  Face to Face    SUBJECTIVE:     Patient Findings     Positives Change in diet/appetite (Pt ate salads over the weekend to get INR in target range for tooth extraction today.)    Comments Pt took 2 decreased doses for dental extraction today           OBJECTIVE    INR Protime   Date Value Ref Range Status   08/28/2017 2.1 (A) 0.86 - 1.14 Final       ASSESSMENT / PLAN  INR assessment THER    Recheck INR In: 5 WEEKS    INR Location Clinic      Anticoagulation Summary as of 8/28/2017     INR goal 2.0-3.0   Today's INR 2.1   Maintenance plan 2 mg (2 mg x 1) on Mon, Fri; 4 mg (2 mg x 2) all other days   Full instructions 2 mg on Mon, Fri; 4 mg all other days   Weekly total 24 mg   No change documented Emily Gunderson RN   Plan last modified Noelle Harrington RN (4/6/2015)   Next INR check 10/5/2017   Priority INR   Target end date Indefinite    Indications   Atrial fibrillation (H) [I48.91]  Long term current use of anticoagulant therapy [Z79.01]         Anticoagulation Episode Summary     INR check location     Preferred lab     Send INR reminders to [facility]    Comments * warfarin 2 mg tabs. Dr Shelley Lennox is pulmonologist. Likes 9 week rechecks      Anticoagulation Care Providers     Provider Role Specialty Phone number    Marcelino Foster MD [facility] [phone]            See the Encounter Report to view Anticoagulation Flowsheet and Dosing Calendar (Go to Encounters tab in chart review, and find the Anticoagulation Therapy Visit)        Emily Gunderson RN

## 2017-08-28 NOTE — MR AVS SNAPSHOT
Stef Bhatt   8/28/2017 9:30 AM   Anticoagulation Therapy Visit    Description:  72 year old male   Provider:  [facility]   Department:  [facility]           INR as of 8/28/2017     Today's INR 2.1      Anticoagulation Summary as of 8/28/2017     INR goal 2.0-3.0   Today's INR 2.1   Full instructions 2 mg on Mon, Fri; 4 mg all other days   Next INR check 10/5/2017    Indications   Atrial fibrillation (H) [I48.91]  Long term current use of anticoagulant therapy [Z79.01]         Description     Recheck INR Oct 5, 2017  continue warfarin 2 mg Mon, Fri, 4 mg rest of week       Your next Anticoagulation Clinic appointment(s)     Aug 28, 2017  9:30 AM CDT   [facility]    [address] [facility] [address]   [phone]            Oct 05, 2017 11:15 AM CDT   Anticoagulation Visit with [facility]   [facility]    [address] [address]   [phone]              Contact Numbers     Please call [phone] to cancel and/or reschedule your appointment.  Please call [phone] with any problems or questions regarding your therapy          August 2017 Details    Sun Mon Tue Wed Thu Fri Sat       1               2               3               4               5                 6               7               8               9               10               11               12                 13               14               15               16               17               18               19                 20               21               22               23               24               25               26                 27               28      2 mg   See details      29      4 mg         30      4 mg         31      4 mg            Date Details   08/28 This INR check               How to take your warfarin dose     To take:  2 mg Take 1 of the  2 mg tablets.    To take:  4 mg Take 2 of the 2 mg tablets.           September 2017 Details    Sun Mon Tue Wed Thu Fri Sat          1      2 mg         2      4 mg           3      4 mg         4      2 mg         5      4 mg         6      4 mg         7      4 mg         8      2 mg         9      4 mg           10      4 mg         11      2 mg         12      4 mg         13      4 mg         14      4 mg         15      2 mg         16      4 mg           17      4 mg         18      2 mg         19      4 mg         20      4 mg         21      4 mg         22      2 mg         23      4 mg           24      4 mg         25      2 mg         26      4 mg         27      4 mg         28      4 mg         29      2 mg         30      4 mg          Date Details   No additional details            How to take your warfarin dose     To take:  2 mg Take 1 of the 2 mg tablets.    To take:  4 mg Take 2 of the 2 mg tablets.           October 2017 Details    Sun Mon Tue Wed Thu Fri Sat     1      4 mg         2      2 mg         3      4 mg         4      4 mg         5            6               7                 8               9               10               11               12               13               14                 15               16               17               18               19               20               21                 22               23               24               25               26               27               28                 29               30               31                    Date Details   No additional details    Date of next INR:  10/5/2017         How to take your warfarin dose     To take:  2 mg Take 1 of the 2 mg tablets.    To take:  4 mg Take 2 of the 2 mg tablets.

## 2017-09-12 DIAGNOSIS — E11.9 TYPE 2 DIABETES MELLITUS WITHOUT COMPLICATION, WITHOUT LONG-TERM CURRENT USE OF INSULIN (H): ICD-10-CM

## 2017-09-12 LAB
CHOLEST SERPL-MCNC: 125 MG/DL
CREAT UR-MCNC: 116 MG/DL
HBA1C MFR BLD: 7.5 % (ref 4.3–6)
HDLC SERPL-MCNC: 33 MG/DL
LDLC SERPL CALC-MCNC: 57 MG/DL
MICROALBUMIN UR-MCNC: 221 MG/L
MICROALBUMIN/CREAT UR: 190.52 MG/G CR (ref 0–17)
NONHDLC SERPL-MCNC: 92 MG/DL
TRIGL SERPL-MCNC: 173 MG/DL

## 2017-09-12 PROCEDURE — 83036 HEMOGLOBIN GLYCOSYLATED A1C: CPT | Performed by: FAMILY MEDICINE

## 2017-09-12 PROCEDURE — 36415 COLL VENOUS BLD VENIPUNCTURE: CPT | Performed by: FAMILY MEDICINE

## 2017-09-12 PROCEDURE — 82043 UR ALBUMIN QUANTITATIVE: CPT | Performed by: FAMILY MEDICINE

## 2017-09-12 PROCEDURE — 80061 LIPID PANEL: CPT | Performed by: FAMILY MEDICINE

## 2017-09-14 ENCOUNTER — OFFICE VISIT (OUTPATIENT)
Dept: FAMILY MEDICINE | Facility: CLINIC | Age: 72
End: 2017-09-14
Payer: MEDICARE

## 2017-09-14 VITALS
OXYGEN SATURATION: 95 % | BODY MASS INDEX: 35.93 KG/M2 | HEIGHT: 70 IN | TEMPERATURE: 96.4 F | WEIGHT: 251 LBS | DIASTOLIC BLOOD PRESSURE: 83 MMHG | HEART RATE: 74 BPM | SYSTOLIC BLOOD PRESSURE: 125 MMHG

## 2017-09-14 DIAGNOSIS — G62.9 PERIPHERAL POLYNEUROPATHY: ICD-10-CM

## 2017-09-14 DIAGNOSIS — I25.701: ICD-10-CM

## 2017-09-14 DIAGNOSIS — J42 CHRONIC BRONCHITIS, UNSPECIFIED CHRONIC BRONCHITIS TYPE (H): ICD-10-CM

## 2017-09-14 DIAGNOSIS — E11.40 TYPE 2 DIABETES MELLITUS WITH DIABETIC NEUROPATHY, WITHOUT LONG-TERM CURRENT USE OF INSULIN (H): ICD-10-CM

## 2017-09-14 DIAGNOSIS — J43.1 PANLOBULAR EMPHYSEMA (H): ICD-10-CM

## 2017-09-14 DIAGNOSIS — Z23 NEED FOR PROPHYLACTIC VACCINATION AND INOCULATION AGAINST INFLUENZA: Primary | ICD-10-CM

## 2017-09-14 PROCEDURE — G0008 ADMIN INFLUENZA VIRUS VAC: HCPCS | Performed by: FAMILY MEDICINE

## 2017-09-14 PROCEDURE — 90662 IIV NO PRSV INCREASED AG IM: CPT | Performed by: FAMILY MEDICINE

## 2017-09-14 PROCEDURE — 99214 OFFICE O/P EST MOD 30 MIN: CPT | Mod: 25 | Performed by: FAMILY MEDICINE

## 2017-09-14 RX ORDER — CEFUROXIME AXETIL 500 MG/1
500 TABLET ORAL 2 TIMES DAILY
Qty: 20 TABLET | Refills: 3 | Status: SHIPPED | OUTPATIENT
Start: 2017-09-14 | End: 2017-09-24

## 2017-09-14 RX ORDER — NITROGLYCERIN 0.4 MG/1
0.4 TABLET SUBLINGUAL EVERY 5 MIN PRN
Qty: 25 TABLET | Refills: 11 | Status: SHIPPED | OUTPATIENT
Start: 2017-09-14 | End: 2018-08-29

## 2017-09-14 RX ORDER — PREGABALIN 50 MG/1
CAPSULE ORAL
Qty: 120 CAPSULE | Refills: 5 | Status: SHIPPED | OUTPATIENT
Start: 2017-09-14 | End: 2018-03-05

## 2017-09-14 NOTE — PROGRESS NOTES
SUBJECTIVE:   Stef Bhatt is a 72 year old male who presents to clinic today for the following health issues:      Diabetes Follow-up      Patient is checking blood sugars: not at all recently with being ill and on antibiotics.  When fasting he was around .  165 when two hours or less after a meal.    Diabetic concerns: None     Symptoms of hypoglycemia (low blood sugar): Had one episode where it was low.  He feels he may have taken one too many diabetic pills.     Paresthesias (numbness or burning in feet) or sores: Yes, he states his symptoms are not worse.  Maybe slightly better.  He does take Lyrica.     Date of last diabetic eye exam: 5-4-2017, records are in his chart.    The Aic is     Hyperlipidemia Follow-Up      Rate your low fat/cholesterol diet?: good-was ill the last 4-5 weeks.    Taking statin?  Yes, possible muscle aches from statin- states issues with his knees.    Other lipid medications/supplements?:  None  Lab Results   Component Value Date    CHOL 125 09/12/2017     Lab Results   Component Value Date    HDL 33 09/12/2017     Lab Results   Component Value Date    LDL 57 09/12/2017     Lab Results   Component Value Date    TRIG 173 09/12/2017     Lab Results   Component Value Date    CHOLHDLRATIO 3.1 08/24/2015       REFILL REQUEST:  He states he needs an updated refill of Nitrostat, Cefuroxime and Lyrica.      ER VISIT:  He was seen in the ER on 8-11-17 and diagnosed with acute non-recurrent pansinusitis.  Dental infection and left eye pain.  He was on Augmentin for two weeks.  He did have the tooth pulled.  He then was taking the Ceftin after having the tooth pulled.    Just wanting to let you know he was on antibiotics for awhile.  He did have to adjust his Coumadin through [facility].     FLU SHOT:  Would like to get the high dose flu shot today.      Amount of exercise or physical activity: None, due to his COPD and hard time with walking.    Problems taking medications  regularly: No    Medication side effects: muscle aches-possible for the knees.  Diet: low fat/cholesterol and carbohydrate counting        Current Outpatient Prescriptions:      umeclidinium-vilanterol (ANORO ELLIPTA) 62.5-25 MCG/INH oral inhaler, Inhale 1 puff into the lungs daily, Disp: , Rfl:      losartan (COZAAR) 50 MG tablet, Take 1 tablet (50 mg) by mouth daily, Disp: 90 tablet, Rfl: 3     metoprolol (TOPROL-XL) 50 MG 24 hr tablet, 100 mg in am, 50 mg in pm, Disp: 270 tablet, Rfl: 3     simvastatin (ZOCOR) 20 MG tablet, Take 1 tablet (20 mg) by mouth daily, Disp: 90 tablet, Rfl: 3     glipiZIDE (GLIPIZIDE XL) 10 MG 24 hr tablet, Take 2 in the am, Disp: 180 tablet, Rfl: 3     metFORMIN (GLUCOPHAGE) 500 MG tablet, Take 1 tablet (500 mg) by mouth daily (with dinner), Disp: 90 tablet, Rfl: 3     nitroglycerin (NITROSTAT) 0.4 MG sublingual tablet, Place 1 tablet (0.4 mg) under the tongue every 5 minutes as needed for chest pain, Disp: 25 tablet, Rfl: 11     pregabalin (LYRICA) 50 MG capsule, Take 2 in the am, 2 in the pm., Disp: 120 capsule, Rfl: 5     warfarin (COUMADIN) 2 MG tablet, As directed by Anticoagulation Clinic (current dose 2mg MF, 4mg rest of the week), Disp: 150 tablet, Rfl: 3     cefUROXime (CEFTIN) 500 MG tablet, Take 1 tablet (500 mg) by mouth 2 times daily, Disp: 20 tablet, Rfl: 3     ketoconazole (NIZORAL) 2 % shampoo, Apply daily then rinse. Pt will call to order, Disp: 120 mL, Rfl: 11     ipratropium - albuterol 0.5 mg/2.5 mg/3 mL (DUONEB) 0.5-2.5 (3) MG/3ML nebulization, Take 1 vial by nebulization every 6 hours as needed for shortness of breath / dyspnea or wheezing, Disp: , Rfl:      predniSONE (DELTASONE) 10 MG tablet, Take 10 mg by mouth daily, Disp: , Rfl:      diphenhydrAMINE-acetaminophen (TYLENOL PM EXTRA STRENGTH)  MG tablet, Take 1 tablet by mouth nightly as needed for sleep, Disp: , Rfl:      albuterol (VENTOLIN HFA) 108 (90 BASE) MCG/ACT inhaler, Inhale 2 puffs into the  lungs every 4 hours as needed Pt will call to order, Disp: 1 Inhaler, Rfl: 11     blood glucose (ACCU-CHEK COMPACT DRUM) test strip, Use to test blood sugar three times daily or as directed., Disp: 270 strip, Rfl: 1     PSYLLIUM HUSK, , Disp: , Rfl:      guaiFENesin (MUCINEX) 600 MG 12 hr tablet, Take 400-600 mg by mouth 2 times daily , Disp: , Rfl:      glucose blood VI test strips strip, Use as directed to check sugars twice a day, Disp: 3 Month, Rfl: 3     fexofenadine (ALLEGRA) 180 MG tablet, Take 180 mg by mouth every evening , Disp: 30 tablet, Rfl: 1     aspirin 81 MG tablet, Take 81 mg by mouth every evening., Disp: 1 tablet, Rfl: 3     ACCU-CHEK SOFTCLIX LANCETS MISC, use as directed to test blood sugars up to once daily; pt will call to order, Disp: 100 Each, Rfl: 11     acetaminophen (TYLENOL) 500 MG tablet, Take 500 mg by mouth every 6 hours as needed for mild pain, Disp: , Rfl:      ascorbic acid (VITAMIN C) 1000 MG TABS, Take 1,000 mg by mouth daily In winter only, Disp: , Rfl:     Patient Active Problem List   Diagnosis     Coronary atherosclerosis     Essential hypertension     Hemorrhage of rectum and anus     Allergic Rhinitis     AK (actinic keratosis)     HYPERLIPIDEMIA LDL GOAL <100     Ventral hernia     Colon polyp     Dysphonia     Senile nuclear sclerosis     Acute myocardial infarction of other specified sites, episode of care unspecified     Health Care Home     Atrial fibrillation (H)     Advance Care Planning     Edge's neuroma     COPD (chronic obstructive pulmonary disease) (H)     Long term current use of anticoagulant therapy     Hyperlipidemia with target LDL less than 100     Morbid obesity (H)     Type 2 diabetes mellitus with diabetic neuropathy (H)     Peripheral polyneuropathy (H)     Chronic bronchitis, unspecified chronic bronchitis type (H)     DJD (degenerative joint disease), cervical     Benign neoplasm of skin of trunk, except scrotum     Decubitus ulcer of buttock,  "stage 1, unspecified laterality       Blood pressure 125/83, pulse 74, temperature 96.4  F (35.8  C), temperature source Tympanic, height 5' 10\" (1.778 m), weight 251 lb (113.9 kg), SpO2 95 %.    Exam:  GENERAL APPEARANCE: healthy, alert and no distress  EYES: EOMI,  PERRL  NECK: no adenopathy, no asymmetry, masses, or scars and thyroid normal to palpation  RESP: lungs clear to auscultation - no rales, rhonchi or wheezes  CV: regular rates and rhythm, normal S1 S2, no S3 or S4 and no murmur, click or rub -  SKIN: no suspicious lesions or rashes  PSYCH: mentation appears normal and affect normal/bright      (J42) Chronic bronchitis, unspecified chronic bronchitis type (H)  Comment:   Plan: cefuroxime (CEFTIN) 500 MG tablet        The med is refilled and use as directed for infections. The flu shot is done today.     (J43.1) Panlobular emphysema (H)  Comment:   Plan: cefuroxime (CEFTIN) 500 MG tablet        See above. Daily exercise is recommended. Avoid contagious exposures.     (E11.40) Type 2 diabetes mellitus with diabetic neuropathy, without long-term current use of insulin (H)  Comment:   Plan: cefuroxime (CEFTIN) 500 MG tablet, **A1C FUTURE        anytime, **Creatinine FUTURE anytime, **TSH         with free T4 reflex FUTURE anytime        Stay on the lower carb diet and daily walking. Do the daily foot care and see the eye doctor annually. The labs are ordered for next visit in six months.     (G62.9) Peripheral polyneuropathy (H)  Comment:   Plan: pregabalin (LYRICA) 50 MG capsule        Use the med and do the daily foot care and avoid going barefoot.     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 If there is increase in the need for this then recheck in the clinic. "       Marcelino Foster

## 2017-09-14 NOTE — PATIENT INSTRUCTIONS
Thank you for choosing [facility].  You may be receiving a survey in the mail from [facility] Mendi[facility] regarding your visit today.  Please take a few minutes to complete and return the survey to let us know how we are doing.      If you have questions or concerns, please contact us via [employer] or you can contact your care team at [phone].    Our Clinic hours are:  Monday 6:40 am  to 7:00 pm  Tuesday -Friday 6:40 am to 5:00 pm    The [place] outpatient lab hours are:  Monday - Friday 6:10 am to 4:45 pm  Saturdays 7:00 am to 11:00 am  Appointments are required, call [phone]    If you have clinical questions after hours or would like to schedule an appointment,  call the clinic at [phone].    (J42) Chronic bronchitis, unspecified chronic bronchitis type (H)  Comment:   Plan: cefuroxime (CEFTIN) 500 MG tablet        The med is refilled and use as directed for infections. The flu shot is done today.     (J43.1) Panlobular emphysema (H)  Comment:   Plan: cefuroxime (CEFTIN) 500 MG tablet        See above. Daily exercise is recommended. Avoid contagious exposures.     (E11.40) Type 2 diabetes mellitus with diabetic neuropathy, without long-term current use of insulin (H)  Comment:   Plan: cefuroxime (CEFTIN) 500 MG tablet, **A1C FUTURE        anytime, **Creatinine FUTURE anytime, **TSH         with free T4 reflex FUTURE anytime        Stay on the lower carb diet and daily walking. Do the daily foot care and see the eye doctor annually. The labs are ordered for next visit in six months.     (G62.9) Peripheral polyneuropathy (H)  Comment:   Plan: pregabalin (LYRICA) 50 MG capsule        Use the med and do the daily foot care and avoid going barefoot.     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 If there is increase in the need for this then recheck in the clinic.

## 2017-09-14 NOTE — PROGRESS NOTES
Injectable Influenza Immunization Documentation    1.  Are you sick today? (Fever of 100.5 or higher on the day of the clinic)   No    2.  Have you ever had Guillain-[place] Syndrome within 6 weeks of an influenza vaccionation?  No    3. Do you have a life-threatening allergy to eggs?  No    4. Do you have a life-threatening allergy to a component of the vaccine? May include antibiotics, gelatin or latex.  No     5. Have you ever had a reaction to a dose of flu vaccine that needed immediate medical attention?  No     Form completed by Heather Greenfield CMA

## 2017-09-14 NOTE — MR AVS SNAPSHOT
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  You may be receiving a survey in the mail from [employer] regarding your visit today.  Please take a few minutes to complete and return the survey to let us know how we are doing.      If you have questions or concerns, please contact us via [employer] or you can contact your care team at [phone].    Our Clinic hours are:  Monday 6:40 am  to 7:00 pm  Tuesday -Friday 6:40 am to 5:00 pm    The [place] outpatient lab hours are:  Monday - Friday 6:10 am to 4:45 pm  Saturdays 7:00 am to 11:00 am  Appointments are required, call [phone]    If you have clinical questions after hours or would like to schedule an appointment,  call the clinic at [phone].    (J42) Chronic bronchitis, unspecified chronic bronchitis type (H)  Comment:   Plan: cefuroxime (CEFTIN) 500 MG tablet        The med is refilled and use as directed for infections. The flu shot is done today.     (J43.1) Panlobular emphysema (H)  Comment:   Plan: cefuroxime (CEFTIN) 500 MG tablet        See above. Daily exercise is recommended. Avoid contagious exposures.     (E11.40) Type 2 diabetes mellitus with diabetic neuropathy, without long-term current use of insulin (H)  Comment:   Plan: cefuroxime  (CEFTIN) 500 MG tablet, **A1C FUTURE        anytime, **Creatinine FUTURE anytime, **TSH         with free T4 reflex FUTURE anytime        Stay on the lower carb diet and daily walking. Do the daily foot care and see the eye doctor annually. The labs are ordered for next visit in six months.     (G62.9) Peripheral polyneuropathy (H)  Comment:   Plan: pregabalin (LYRICA) 50 MG capsule        Use the med and do the daily foot care and avoid going barefoot.     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 If there is increase in the need for this then recheck in the clinic.           Follow-ups after your visit        Your next 10 appointments already scheduled     Oct 05, 2017 11:15 AM CDT   Anticoagulation Visit with [place] [facility]TI COAG   [facility]    [address] [address] [phone]              Future tests that were ordered for you today     Open Future Orders        Priority Expected Expires Ordered    **A1C FUTURE anytime Routine 9/14/2017 9/14/2018 9/14/2017    **Creatinine FUTURE anytime Routine 9/14/2017 9/14/2018 9/14/2017    **TSH with free T4 reflex FUTURE anytime Routine 9/14/2017 9/14/2018 9/14/2017            Who to contact     If you have questions or need follow up information about today's clinic visit or your schedule please contact [facility] directly at [phone].  Normal or non-critical lab and imaging results will be communicated to you by MyChart, letter or phone within 4 business days after the clinic has received the results. If you do not hear from us within 7 days, please contact the clinic through MyChart or phone. If you have a critical or abnormal lab result, we will notify  "you by phone as soon as possible.  Submit refill requests through [employer] or call your pharmacy and they will forward the refill request to us. Please allow 3 business days for your refill to be completed.          Additional Information About Your Visit        [employer] Information     [employer] lets you send messages to your doctor, view your test results, renew your prescriptions, schedule appointments and more. To sign up, go to [email or web address] . Click on \"Log in\" on the left side of the screen, which will take you to the Welcome page. Then click on \"Sign up Now\" on the right side of the page.     You will be asked to enter the access code listed below, as well as some personal information. Please follow the directions to create your username and password.     Your access code is: [email or web address]  Expires: 10/17/2017 11:47 AM     Your access code will  in 90 days. If you need help or a new code, please call your [place] clinic or [phone].        Care EveryWhere ID     This is your Care EveryWhere ID. This could be used by other organizations to access your [place] medical records  [id number]        Your Vitals Were     Pulse Temperature Height Pulse Oximetry BMI (Body Mass Index)       74 96.4  F (35.8  C) (Tympanic) 5' 10\" (1.778 m) 95% 36.01 kg/m2        Blood Pressure from Last 3 Encounters:   17 125/83   17 113/53   17 113/76    Weight from Last 3 Encounters:   17 251 lb (113.9 kg)   17 248 lb (112.5 kg)   17 247 lb (112 kg)                 Where to get your medicines      These medications were sent to [facility] [address] [facility]  [id number] [place] [address]     Phone:  [phone]     cefuroxime 500 MG tablet    nitroGLYcerin 0.4 MG sublingual tablet         Some of these will need a paper prescription and others can be bought over the counter.  Ask your nurse if you have questions.     Bring a paper prescription " for each of these medications     pregabalin 50 MG capsule          Primary Care Provider Office Phone # Fax #    Marcelino Foster [phone] [address] [address]        Equal Access to Services     BARBI ROMERO : Hadii aad ku hadjose miguelowen Somiroslavaali, waaxda luqadaha, qaybta kaalmada adeal, susanne roseyin hayaaallie reynoso cong stephanie padilla. So [facility] [phone].    ATENCIÓN: Si habla español, tiene a chau disposición servicios gratuitos de asistencia lingüística. Llame al [phone].    We comply with applicable federal civil rights laws and [place] laws. We do not discriminate on the basis of race, color, national origin, age, disability sex, sexual orientation or gender identity.            Thank you!     Thank you for choosing [facility]  for your care. Our goal is always to provide you with excellent care. Hearing back from our patients is one way we can continue to improve our services. Please take a few minutes to complete the written survey that you may receive in the mail after your visit with us. Thank you!             Your Updated Medication List - Protect others around you: Learn how to safely use, store and throw away your medicines at www.disposemymeds.org.          This list is accurate as of: 9/14/17 10:50 AM.  Always use your most recent med list.                   Brand Name Dispense Instructions for use Diagnosis    albuterol 108 (90 BASE) MCG/ACT Inhaler    VENTOLIN HFA    1 Inhaler    Inhale 2 puffs into the lungs every 4 hours as needed Pt will call to order    COPD (chronic obstructive pulmonary disease) (H)       ALLEGRA 180 MG tablet   Generic drug:  fexofenadine     30 tablet    Take 180 mg by mouth every evening        ascorbic acid 1000 MG Tabs    vitamin C     Take 1,000 mg by mouth daily In winter only        aspirin 81 MG tablet     1 tablet    Take 81 mg by mouth every evening.        blood glucose monitoring lancets     100 Each    use  as directed to test blood sugars up to once daily; pt will call to order    Type II or unspecified type diabetes mellitus without mention of complication, not stated as uncontrolled       * blood glucose monitoring test strip    no brand specified    3 Month    Use as directed to check sugars twice a day    Type II or unspecified type diabetes mellitus without mention of complication, not stated as uncontrolled       * blood glucose monitoring test strip    ACCU-CHEK COMPACT DRUM    270 strip    Use to test blood sugar three times daily or as directed.    Obesity, unspecified, Type 2 diabetes, HbA1C goal < 8% (H)       cefuroxime 500 MG tablet    CEFTIN    20 tablet    Take 1 tablet (500 mg) by mouth 2 times daily for 10 days    Chronic bronchitis, unspecified chronic bronchitis type (H), Panlobular emphysema (H), Type 2 diabetes mellitus with diabetic neuropathy, without long-term current use of insulin (H)       glipiZIDE 10 MG 24 hr tablet    glipiZIDE XL    180 tablet    Take 2 in the am    Type 2 diabetes mellitus without complication, without long-term current use of insulin (H)       ipratropium - albuterol 0.5 mg/2.5 mg/3 mL 0.5-2.5 (3) MG/3ML neb solution    DUONEB     Take 1 vial by nebulization every 6 hours as needed for shortness of breath / dyspnea or wheezing        ketoconazole 2 % shampoo    NIZORAL    120 mL    Apply daily then rinse. Pt will call to order    Seborrheic dermatitis       losartan 50 MG tablet    COZAAR    90 tablet    Take 1 tablet (50 mg) by mouth daily    Essential hypertension with goal blood pressure less than 130/80       metFORMIN 500 MG tablet    GLUCOPHAGE    90 tablet    Take 1 tablet (500 mg) by mouth daily (with dinner)    Type 2 diabetes mellitus without complication, without long-term current use of insulin (H)       metoprolol 50 MG 24 hr tablet    TOPROL-XL    270 tablet    100 mg in am, 50 mg in pm    Essential hypertension with goal blood pressure less than 130/80        MUCINEX 600 MG 12 hr tablet   Generic drug:  guaiFENesin      Take 400-600 mg by mouth 2 times daily        nitroGLYcerin 0.4 MG sublingual tablet    NITROSTAT    25 tablet    Place 1 tablet (0.4 mg) under the tongue every 5 minutes as needed for chest pain    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    Peripheral polyneuropathy (H)       PSYLLIUM HUSK           simvastatin 20 MG tablet    ZOCOR    90 tablet    Take 1 tablet (20 mg) by mouth daily    Hyperlipidemia with target LDL less than 100       TYLENOL 500 MG tablet   Generic drug:  acetaminophen      Take 500 mg by mouth every 6 hours as needed for mild pain        TYLENOL PM EXTRA STRENGTH  MG tablet   Generic drug:  diphenhydrAMINE-acetaminophen      Take 1 tablet by mouth nightly as needed for sleep        umeclidinium-vilanterol 62.5-25 MCG/INH oral inhaler    ANORO ELLIPTA     Inhale 1 puff into the lungs daily        warfarin 2 MG tablet    COUMADIN    150 tablet    As directed by Anticoagulation Clinic (current dose 2mg MF, 4mg rest of the week)    Long term (current) use of anticoagulants       * Notice:  This list has 2 medication(s) that are the same as other medications prescribed for you. Read the directions carefully, and ask your doctor or other care provider to review them with you.

## 2017-09-14 NOTE — NURSING NOTE
"Chief Complaint   Patient presents with     Diabetes     Recheck on diabetes.     Lipids     Recheck on lipids done on 9-12-17.     Flu Shot     Flu shot.     FU After ER Visit     He was seen in the ER on 8-11-17.       Initial /83  Pulse 74  Temp 96.4  F (35.8  C) (Tympanic)  Ht 5' 10\" (1.778 m)  Wt 251 lb (113.9 kg)  SpO2 95%  BMI 36.01 kg/m2 Estimated body mass index is 36.01 kg/(m^2) as calculated from the following:    Height as of this encounter: 5' 10\" (1.778 m).    Weight as of this encounter: 251 lb (113.9 kg).  Medication Reconciliation: complete  "

## 2017-10-05 ENCOUNTER — ANTICOAGULATION THERAPY VISIT (OUTPATIENT)
Dept: ANTICOAGULATION | Facility: CLINIC | Age: 72
End: 2017-10-05
Payer: MEDICARE

## 2017-10-05 DIAGNOSIS — I48.91 ATRIAL FIBRILLATION (H): ICD-10-CM

## 2017-10-05 DIAGNOSIS — Z79.01 LONG TERM CURRENT USE OF ANTICOAGULANT THERAPY: ICD-10-CM

## 2017-10-05 LAB — INR POINT OF CARE: 3.1 (ref 0.86–1.14)

## 2017-10-05 PROCEDURE — 99207 ZZC NO CHARGE NURSE ONLY: CPT

## 2017-10-05 PROCEDURE — 85610 PROTHROMBIN TIME: CPT | Mod: QW

## 2017-10-05 PROCEDURE — 36416 COLLJ CAPILLARY BLOOD SPEC: CPT

## 2017-10-05 NOTE — MR AVS SNAPSHOT
Stef Bhatt   10/5/2017 10:00 AM   Anticoagulation Therapy Visit    Description:  72 year old male   Provider:  [facility]   Department:  [facility]           INR as of 10/5/2017     Today's INR 3.1!      Anticoagulation Summary as of 10/5/2017     INR goal 2.0-3.0   Today's INR 3.1!   Full instructions 2 mg on Mon, Fri; 4 mg all other days   Next INR check 11/30/2017    Indications   Atrial fibrillation (H) [I48.91]  Long term current use of anticoagulant therapy [Z79.01]         Your next Anticoagulation Clinic appointment(s)     Nov 30, 2017 10:30 AM CST   Anticoagulation Visit with [facility]   [facility]    [address] [address] [phone]              Contact Numbers     Please call [phone] to cancel and/or reschedule your appointment.  Please call [phone] with any problems or questions regarding your therapy          October 2017 Details    Sun Mon Tue Wed u Fri Sat     1               2               3               4               5      4 mg   See details      6      2 mg         7      4 mg           8      4 mg         9      2 mg         10      4 mg         11      4 mg         12      4 mg         13      2 mg         14      4 mg           15      4 mg         16      2 mg         17      4 mg         18      4 mg         19      4 mg         20      2 mg         21      4 mg           22      4 mg         23      2 mg         24      4 mg         25      4 mg         26      4 mg         27      2 mg         28      4 mg           29      4 mg         30      2 mg         31      4 mg              Date Details   10/05 This INR check               How to take your warfarin dose     To take:  2 mg Take 1 of the 2 mg tablets.    To take:  4 mg Take 2 of the 2 mg tablets.           November 2017 Details    Sun Mon Tue Wed Thu Fri Sat        1      4 mg         2      4 mg         3      2 mg         4      4  mg           5      4 mg         6      2 mg         7      4 mg         8      4 mg         9      4 mg         10      2 mg         11      4 mg           12      4 mg         13      2 mg         14      4 mg         15      4 mg         16      4 mg         17      2 mg         18      4 mg           19      4 mg         20      2 mg         21      4 mg         22      4 mg         23      4 mg         24      2 mg         25      4 mg           26      4 mg         27      2 mg         28      4 mg         29      4 mg         30               Date Details   No additional details    Date of next INR:  11/30/2017         How to take your warfarin dose     To take:  2 mg Take 1 of the 2 mg tablets.    To take:  4 mg Take 2 of the 2 mg tablets.

## 2017-10-05 NOTE — PROGRESS NOTES
ANTICOAGULATION FOLLOW-UP CLINIC VISIT    Patient Name:  Stef Bhatt  Date:  10/5/2017  Contact Type:  Face to Face    SUBJECTIVE:     Patient Findings     Positives Change in diet/appetite (Pt reports he has not been eating salad since tooth extraction), Dental/Other procedures (Pt had tooth extracted 8/28, held 1 day and slight dose decrease to get INR close to 2.0)    Comments Pt reports he stopped eating salad after tooth extraction because he wanted INR to go back up, continued to have 4 alcoholic drinks/week, denies other changes.           OBJECTIVE    INR Protime   Date Value Ref Range Status   10/05/2017 3.1 (A) 0.86 - 1.14 Final       ASSESSMENT / PLAN  INR assessment THER per protocol    Recheck INR In: 8 WEEKS    INR Location Clinic      Anticoagulation Summary as of 10/5/2017     INR goal 2.0-3.0   Today's INR 3.1!   Maintenance plan 2 mg (2 mg x 1) on Mon, Fri; 4 mg (2 mg x 2) all other days   Full instructions 2 mg on Mon, Fri; 4 mg all other days   Weekly total 24 mg   Plan last modified Noelle Harrington RN (4/6/2015)   Next INR check 11/30/2017   Priority INR   Target end date Indefinite    Indications   Atrial fibrillation (H) [I48.91]  Long term current use of anticoagulant therapy [Z79.01]         Anticoagulation Episode Summary     INR check location     Preferred lab     Send INR reminders to [facility]    Comments * warfarin 2 mg tabs. Dr Shelley Lennox is pulmonologist. Likes 9 week rechecks      Anticoagulation Care Providers     Provider Role Specialty Phone number    Marcelino Foster MD [facility] [phone]            See the Encounter Report to view Anticoagulation Flowsheet and Dosing Calendar (Go to Encounters tab in chart review, and find the Anticoagulation Therapy Visit)        Vandana Jaramillo RN

## 2017-11-16 ENCOUNTER — TRANSFERRED RECORDS (OUTPATIENT)
Dept: HEALTH INFORMATION MANAGEMENT | Facility: CLINIC | Age: 72
End: 2017-11-16

## 2017-11-17 ENCOUNTER — TELEPHONE (OUTPATIENT)
Dept: ALLERGY | Facility: CLINIC | Age: 72
End: 2017-11-17

## 2017-11-17 NOTE — TELEPHONE ENCOUNTER
Fax received from [facility].   Labeled and sent to scanning.   Dorina GERMAN RN

## 2017-11-30 ENCOUNTER — ANTICOAGULATION THERAPY VISIT (OUTPATIENT)
Dept: ANTICOAGULATION | Facility: CLINIC | Age: 72
End: 2017-11-30
Payer: MEDICARE

## 2017-11-30 DIAGNOSIS — I48.91 ATRIAL FIBRILLATION (H): ICD-10-CM

## 2017-11-30 DIAGNOSIS — Z79.01 LONG TERM CURRENT USE OF ANTICOAGULANT THERAPY: ICD-10-CM

## 2017-11-30 LAB — INR POINT OF CARE: 3.8 (ref 0.86–1.14)

## 2017-11-30 PROCEDURE — 85610 PROTHROMBIN TIME: CPT | Mod: QW

## 2017-11-30 PROCEDURE — 36416 COLLJ CAPILLARY BLOOD SPEC: CPT

## 2017-11-30 PROCEDURE — 99207 ZZC NO CHARGE NURSE ONLY: CPT

## 2017-11-30 NOTE — MR AVS SNAPSHOT
Stef Bhatt   11/30/2017 10:30 AM   Anticoagulation Therapy Visit    Description:  72 year old male   Provider:  [facility]   Department:  [facility]           INR as of 11/30/2017     Today's INR 3.8!      Anticoagulation Summary as of 11/30/2017     INR goal 2.0-3.0   Today's INR 3.8!   Full instructions 11/30: 1 mg; Otherwise 2 mg on Mon, Fri; 4 mg all other days   Next INR check 12/14/2017    Indications   Atrial fibrillation (H) [I48.91]  Long term current use of anticoagulant therapy [Z79.01]         Your next Anticoagulation Clinic appointment(s)     Dec 14, 2017  1:15 PM CST   Anticoagulation Visit with [facility] [facility]   [facility]    [address] [address] [phone]              Contact Numbers     Please call [phone] to cancel and/or reschedule your appointment.  Please call [phone] with any problems or questions regarding your therapy          November 2017 Details    Sun Mon Tue Wed Thu Fri Sat        1               2               3               4                 5               6               7               8               9               10               11                 12               13               14               15               16               17               18                 19               20               21               22               23               24               25                 26               27               28               29               30      1 mg   See details         Date Details   11/30 This INR check               How to take your warfarin dose     To take:  1 mg Take 0.5 of a 2 mg tablet.           December 2017 Details    Sun Mon Tue Wed Thu Fri Sat          1      2 mg         2      4 mg           3      4 mg         4      2 mg         5      4 mg         6      4 mg         7      4 mg         8      2 mg         9      4 mg           10      4 mg         11       2 mg         12      4 mg         13      4 mg         14            15               16                 17               18               19               20               21               22               23                 24               25               26               27               28               29               30                 31                      Date Details   No additional details    Date of next INR:  12/14/2017         How to take your warfarin dose     To take:  2 mg Take 1 of the 2 mg tablets.    To take:  4 mg Take 2 of the 2 mg tablets.

## 2017-11-30 NOTE — PROGRESS NOTES
ANTICOAGULATION FOLLOW-UP CLINIC VISIT    Patient Name:  Stef Bhatt  Date:  11/30/2017  Contact Type:  Face to Face    SUBJECTIVE:     Patient Findings     Positives Change in diet/appetite (Pt eating less greens, had more alcohol over past week than usual)    Comments Pt denies s/s of bleeding. Writer instructed pt to take 1 time dose decrease today (13% decrease), then resume maintenance dose, will recheck in 2 weeks.           OBJECTIVE    INR Protime   Date Value Ref Range Status   11/30/2017 3.8 (A) 0.86 - 1.14 Final       ASSESSMENT / PLAN  INR assessment SUPRA    Recheck INR In: 2 WEEKS    INR Location Clinic      Anticoagulation Summary as of 11/30/2017     INR goal 2.0-3.0   Today's INR 3.8!   Maintenance plan 2 mg (2 mg x 1) on Mon, Fri; 4 mg (2 mg x 2) all other days   Full instructions 11/30: 1 mg; Otherwise 2 mg on Mon, Fri; 4 mg all other days   Weekly total 24 mg   Plan last modified Noelle Harrington RN (4/6/2015)   Next INR check 12/14/2017   Priority INR   Target end date Indefinite    Indications   Atrial fibrillation (H) [I48.91]  Long term current use of anticoagulant therapy [Z79.01]         Anticoagulation Episode Summary     INR check location     Preferred lab     Send INR reminders to [facility]    Comments * warfarin 2 mg tabs. Dr Shelley Lennox is pulmonologist. Likes 9 week rechecks      Anticoagulation Care Providers     Provider Role Specialty Phone number    Marcelino Foster MD [facility], [phone]            See the Encounter Report to view Anticoagulation Flowsheet and Dosing Calendar (Go to Encounters tab in chart review, and find the Anticoagulation Therapy Visit)        Vandana Jaramillo RN

## 2017-12-04 ENCOUNTER — TELEPHONE (OUTPATIENT)
Dept: FAMILY MEDICINE | Facility: CLINIC | Age: 72
End: 2017-12-04

## 2017-12-04 DIAGNOSIS — J42 CHRONIC BRONCHITIS, UNSPECIFIED CHRONIC BRONCHITIS TYPE (H): Primary | ICD-10-CM

## 2017-12-04 NOTE — TELEPHONE ENCOUNTER
Pt informed.   He is refusing Augmentin.  He says that Augmentin has never worked for him.    Pt says that Dr Foster suggested that they could try going back to Levaquin as discussed during the September appt.  Pt says that Dr Foster had said that he has been off Levaqin for a long enough time that it may be effective again.    Routed back to Dr Foster.  Donna Reddy RN

## 2017-12-04 NOTE — TELEPHONE ENCOUNTER
Routed to Dr Foster.  Please see message below.  Do you wish to see him to discuss or can you prescribe at this time?  Donna Reddy RN    Pt last seen 9/14/17 with the following:  Panlobular emphysema    cefuroxime (CEFTIN) 500 MG tablet        Daily exercise is recommended.    Avoid contagious exposures.

## 2017-12-04 NOTE — TELEPHONE ENCOUNTER
Reason for Call:  Medication or medication refill:    Do you use a [facility]?  Name of the pharmacy and phone number for the current request:  [facility] [phone]    Name of the medication requested: Patient calling in as Dr. Foster gives him regular antibiotics to take when he is sick. Patient has COPD and gets sick easily. Patient is now on Cefuroxime and thinks he is getting immune to this antibiotic. Patient took a round of the antibiotic and is now sick a week later.  He went through this cycle with Levoquin as well. He took Levoquin for years, then had to switch off of it as it was not helping. Dr. Villalpando mentioned there is a 3rd option similar to the cefuroxime and levoquin. Patient would like to try this 3rd option to see if it will help, or go back to levoquin to see if this could help now that he's been off it for a while.    Other request: none    Can we leave a detailed message on this number? YES    Phone number patient can be reached at: Home number on file [phone] (home)    Best Time: any    Call taken on 12/4/2017 at 11:54 AM by Mitzi Hernandez

## 2017-12-04 NOTE — TELEPHONE ENCOUNTER
Please notify prescription sent to pharmacy for Augmentin at 875 mg twice daily for 10 days.   There is one refill. If this is not effective then I would like to see him in clinic. Thanks. .Marcelino Foster

## 2017-12-05 NOTE — TELEPHONE ENCOUNTER
Routed to provider pool since Dr Foster has left for the day and is out of clinic on Tuesday.  Donna Reddy RN

## 2017-12-05 NOTE — TELEPHONE ENCOUNTER
Covering for PCP:  Would suggest that he at least give the Augmentin a try for a few days first.  It could be a different type of infection this time and may be helpful this time around.  If still not improving, follow-up with PCP.    Jose De Jesus Pritchett MD

## 2017-12-14 ENCOUNTER — ANTICOAGULATION THERAPY VISIT (OUTPATIENT)
Dept: ANTICOAGULATION | Facility: CLINIC | Age: 72
End: 2017-12-14
Payer: MEDICARE

## 2017-12-14 DIAGNOSIS — I48.91 ATRIAL FIBRILLATION (H): ICD-10-CM

## 2017-12-14 DIAGNOSIS — Z79.01 LONG TERM CURRENT USE OF ANTICOAGULANT THERAPY: ICD-10-CM

## 2017-12-14 LAB — INR POINT OF CARE: 2.6 (ref 0.86–1.14)

## 2017-12-14 PROCEDURE — 85610 PROTHROMBIN TIME: CPT | Mod: QW

## 2017-12-14 PROCEDURE — 36416 COLLJ CAPILLARY BLOOD SPEC: CPT

## 2017-12-14 PROCEDURE — 99207 ZZC NO CHARGE NURSE ONLY: CPT

## 2017-12-14 NOTE — PROGRESS NOTES
ANTICOAGULATION FOLLOW-UP CLINIC VISIT    Patient Name:  Stef Bhatt  Date:  12/14/2017  Contact Type:  Face to Face    SUBJECTIVE:     Patient Findings     Positives Change in medications (Patient took prednisone 20mg daily from Dec 2nd-8th. )    Comments Patient was not feeling well the beginning of the month. He was on Cefuroxime and had requested another antibiotic. He was given amoxicillin but has not yet taken it. Patient thought he may try it but is still not 100% sure.     Patient is requesting to come back in his usual 9 week time. Since patient is very aware of his medications and keeps close track of changes, plan to recheck in 9 weeks. If he has any changes at all - new antibiotics or prednisone bursts he will return sooner.            OBJECTIVE    INR Protime   Date Value Ref Range Status   12/14/2017 2.6 (A) 0.86 - 1.14 Final       ASSESSMENT / PLAN  INR assessment THER    Recheck INR In: 9 WEEKS    INR Location Clinic      Anticoagulation Summary as of 12/14/2017     INR goal 2.0-3.0   Today's INR 2.6   Maintenance plan 2 mg (2 mg x 1) on Mon, Fri; 4 mg (2 mg x 2) all other days   Full instructions 2 mg on Mon, Fri; 4 mg all other days   Weekly total 24 mg   No change documented Noelle Harrington RN   Plan last modified Noelle Harrington RN (4/6/2015)   Next INR check 2/15/2018   Priority INR   Target end date Indefinite    Indications   Atrial fibrillation (H) [I48.91]  Long term current use of anticoagulant therapy [Z79.01]         Anticoagulation Episode Summary     INR check location     Preferred lab     Send INR reminders to [facility]    Comments * warfarin 2 mg tabs. Dr Shelley Lennox is pulmonologist. Likes 9 week rechecks      Anticoagulation Care Providers     Provider Role Specialty Phone number    Marcelino Foster MD [facility] [phone]            See the Encounter Report to view Anticoagulation Flowsheet and Dosing Calendar (Go to  Encounters tab in chart review, and find the Anticoagulation Therapy Visit)        Noelle Harrington RN

## 2017-12-14 NOTE — MR AVS SNAPSHOT
Stef Bhatt   12/14/2017 1:15 PM   Anticoagulation Therapy Visit    Description:  72 year old male   Provider:  [facility]   Department:  [facility]           INR as of 12/14/2017     Today's INR 2.6      Anticoagulation Summary as of 12/14/2017     INR goal 2.0-3.0   Today's INR 2.6   Full instructions 2 mg on Mon, Fri; 4 mg all other days   Next INR check 2/15/2018    Indications   Atrial fibrillation (H) [I48.91]  Long term current use of anticoagulant therapy [Z79.01]         Your next Anticoagulation Clinic appointment(s)     Feb 15, 2018  1:15 PM CST   Anticoagulation Visit with [facility] [facility]W[facility]   [facility]    [address] [address]   [phone]              December 2017 Details    Sun Mon Tue Wed Thu Fri Sat          1               2                 3               4               5               6               7               8               9                 10               11               12               13               14      4 mg   See details      15      2 mg         16      4 mg           17      4 mg         18      2 mg         19      4 mg         20      4 mg         21      4 mg         22      2 mg         23      4 mg           24      4 mg         25      2 mg         26      4 mg         27      4 mg         28      4 mg         29      2 mg         30      4 mg           31      4 mg                Date Details   12/14 This INR check               How to take your warfarin dose     To take:  2 mg Take 1 of the 2 mg tablets.    To take:  4 mg Take 2 of the 2 mg tablets.           January 2018 Details    Sun Mon Tue Wed Thu Fri Sat      1      2 mg         2      4 mg         3      4 mg         4      4 mg         5      2 mg         6      4 mg           7      4 mg         8      2 mg         9      4 mg         10      4 mg         11      4 mg         12      2 mg         13      4 mg           14      4  mg         15      2 mg         16      4 mg         17      4 mg         18      4 mg         19      2 mg         20      4 mg           21      4 mg         22      2 mg         23      4 mg         24      4 mg         25      4 mg         26      2 mg         27      4 mg           28      4 mg         29      2 mg         30      4 mg         31      4 mg             Date Details   No additional details            How to take your warfarin dose     To take:  2 mg Take 1 of the 2 mg tablets.    To take:  4 mg Take 2 of the 2 mg tablets.           February 2018 Details    Sun Mon Tue Wed Thu Fri Sat         1      4 mg         2      2 mg         3      4 mg           4      4 mg         5      2 mg         6      4 mg         7      4 mg         8      4 mg         9      2 mg         10      4 mg           11      4 mg         12      2 mg         13      4 mg         14      4 mg         15            16               17                 18               19               20               21               22               23               24                 25               26               27               28                   Date Details   No additional details    Date of next INR:  2/15/2018         How to take your warfarin dose     To take:  2 mg Take 1 of the 2 mg tablets.    To take:  4 mg Take 2 of the 2 mg tablets.

## 2018-02-01 ENCOUNTER — TELEPHONE (OUTPATIENT)
Dept: FAMILY MEDICINE | Facility: CLINIC | Age: 73
End: 2018-02-01

## 2018-02-01 ENCOUNTER — NURSE TRIAGE (OUTPATIENT)
Dept: NURSING | Facility: CLINIC | Age: 73
End: 2018-02-01

## 2018-02-01 NOTE — TELEPHONE ENCOUNTER
Patient is calling and stating that he wants to go back to the Ceftin, as the Augmentin did nothing for him.He took the Ceftin he has, and feels so much better. If you need to talk to him call [phone].  Zari Salazar  [facility]

## 2018-02-02 NOTE — TELEPHONE ENCOUNTER
"Clinic Action Needed:STEPHEN  [facility] Triage Call  Presenting Problem:    Patient returning clinic phone call.   States that he has had wheezing, difficulty breathing, fever, cold symptoms and his \"lungs hurt.\"  Was prescribed Augmentin \"which did nothing for me.\"   Patient states that he has been on Ceftin in the past which works well for him.   Had refills of Ceftin and started the medication 5 days ago   cefuroxime (CEFTIN) 500 MG tablet 20 tablet 3 9/14/2017 9/24/2017 No      Sig: Take 1 tablet (500 mg) by mouth 2 times daily for 10 days     Patient states that he is just calling to let his doctor know that he started the Ceftin, will be taking his 6th dose tomorrow and that he is feeling much better.  \"My lungs don't hurt anymore.\"   Patient states that he does not want to go to the clinic right now. \"I want to wait until after flu season. I don't want to get the flu.\"   Patient states that he will make an appointment to see his PCP the end of March or early April.     Message sent to clinic per Patient request to update PCP    Routed to:Dr Foster/nurse bill Eden RN/ [facility]        "

## 2018-02-02 NOTE — TELEPHONE ENCOUNTER
Augmentin was prescribed 12/4/17    Ceftin was prescribed 9/214/17    Pt was seen for chronic bronchitis 9/17/17.   Need more information as we generally do not prescribe antibiotics without an appt.     Left message for patient to return a call to the clinic PHYLLIS Reddy RN

## 2018-02-02 NOTE — TELEPHONE ENCOUNTER
"Patient returning clinic phone call.   States that he had had wheezing, difficulty breathing, fever, cold symptoms and his \"lungs hurt.\"  Was prescribed Augmentin \"which did nothing for me.\"   Patient states that he has been on Ceftin in the past which works well for him.   Had refills of Ceftin and started the medication 5 days ago   cefuroxime (CEFTIN) 500 MG tablet 20 tablet 3 9/14/2017 9/24/2017 No      Sig: Take 1 tablet (500 mg) by mouth 2 times daily for 10 days     Patient states that he is just calling to let his doctor know that he started the Ceftin, will be taking his 6th dose tomorrow and that he is feeling much better.  \"My lungs don't hurt anymore.\"   Patient states that he does not want to go to the clinic right now. \"I want to wait until after flu season. I don't want to go the flu.\"   Patient states that he will make an appointment to see his PCP the end of March or early April.     Message sent to clinic per Patient request to update PCP    Advised to call back if further questions or concerns.   Reason for Disposition    [1] Caller requesting NON-URGENT health information AND [2] PCP's office is the best resource    Protocols used: INFORMATION ONLY CALL-ADULT-    "

## 2018-02-15 ENCOUNTER — ANTICOAGULATION THERAPY VISIT (OUTPATIENT)
Dept: ANTICOAGULATION | Facility: CLINIC | Age: 73
End: 2018-02-15
Payer: MEDICARE

## 2018-02-15 DIAGNOSIS — I48.91 ATRIAL FIBRILLATION (H): ICD-10-CM

## 2018-02-15 DIAGNOSIS — Z79.01 LONG TERM CURRENT USE OF ANTICOAGULANT THERAPY: ICD-10-CM

## 2018-02-15 LAB — INR POINT OF CARE: 2.3 (ref 0.86–1.14)

## 2018-02-15 PROCEDURE — 36416 COLLJ CAPILLARY BLOOD SPEC: CPT

## 2018-02-15 PROCEDURE — 99207 ZZC NO CHARGE NURSE ONLY: CPT

## 2018-02-15 PROCEDURE — 85610 PROTHROMBIN TIME: CPT | Mod: QW

## 2018-02-15 NOTE — MR AVS SNAPSHOT
Stef Bhatt   2/15/2018 1:15 PM   Anticoagulation Therapy Visit    Description:  72 year old male   Provider:  [facility]   Department:  [facility]           INR as of 2/15/2018     Today's INR 2.3      Anticoagulation Summary as of 2/15/2018     INR goal 2.0-3.0   Today's INR 2.3   Full instructions 2 mg on Mon, Fri; 4 mg all other days   Next INR check 4/19/2018    Indications   Atrial fibrillation (H) [I48.91]  Long term current use of anticoagulant therapy [Z79.01]         Your next Anticoagulation Clinic appointment(s)     Apr 19, 2018  1:15 PM CDT   Anticoagulation Visit with [place] [facility]   [facility]    [address] [address] [phone]              Contact Numbers     Please call [phone] with any problems or questions regarding your therapy.    If you need to cancel and/or reschedule your appointment please call one of the following numbers:  [facility] [phone]  [place] - [phone]  [place] - [phone]  [facility] [place] [facility]\"" [phone]  [place] - [phone]            February 2018 Details    Sun Mon Tue Wed Thu Fri Sat         1               2               3                 4               5               6               7               8               9               10                 11               12               13               14               15      4 mg   See details      16      2 mg         17      4 mg           18      4 mg         19      2 mg         20      4 mg         21      4 mg         22      4 mg         23      2 mg         24      4 mg           25      4 mg         26      2 mg         27      4 mg         28      4 mg             Date Details   02/15 This INR check               How to take your warfarin dose     To take:  2 mg Take 1 of the 2 mg tablets.    To take:  4 mg Take 2 of the 2 mg tablets.           March 2018 Details    Sun Mon Tue Wed Thu Fri Sat         1      4 mg          2      2 mg         3      4 mg           4      4 mg         5      2 mg         6      4 mg         7      4 mg         8      4 mg         9      2 mg         10      4 mg           11      4 mg         12      2 mg         13      4 mg         14      4 mg         15      4 mg         16      2 mg         17      4 mg           18      4 mg         19      2 mg         20      4 mg         21      4 mg         22      4 mg         23      2 mg         24      4 mg           25      4 mg         26      2 mg         27      4 mg         28      4 mg         29      4 mg         30      2 mg         31      4 mg          Date Details   No additional details            How to take your warfarin dose     To take:  2 mg Take 1 of the 2 mg tablets.    To take:  4 mg Take 2 of the 2 mg tablets.           April 2018 Details    Sun Mon Tue Wed Thu Fri Sat     1      4 mg         2      2 mg         3      4 mg         4      4 mg         5      4 mg         6      2 mg         7      4 mg           8      4 mg         9      2 mg         10      4 mg         11      4 mg         12      4 mg         13      2 mg         14      4 mg           15      4 mg         16      2 mg         17      4 mg         18      4 mg         19            20               21                 22               23               24               25               26               27               28                 29               30                     Date Details   No additional details    Date of next INR:  4/19/2018         How to take your warfarin dose     To take:  2 mg Take 1 of the 2 mg tablets.    To take:  4 mg Take 2 of the 2 mg tablets.

## 2018-02-15 NOTE — PROGRESS NOTES
ANTICOAGULATION FOLLOW-UP CLINIC VISIT    Patient Name:  Stef Bhatt  Date:  2/15/2018  Contact Type:  Face to Face    SUBJECTIVE:     Patient Findings     Positives Change in medications (finished course of cefuroxime 2/6/18)    Comments Pt denies other changes in medications, diet, activity or health, reports taking warfarin as directed.             OBJECTIVE    INR Protime   Date Value Ref Range Status   02/15/2018 2.3 (A) 0.86 - 1.14 Final       ASSESSMENT / PLAN  INR assessment THER    Recheck INR In: 9 WEEKS    INR Location Clinic      Anticoagulation Summary as of 2/15/2018     INR goal 2.0-3.0   Today's INR 2.3   Maintenance plan 2 mg (2 mg x 1) on Mon, Fri; 4 mg (2 mg x 2) all other days   Full instructions 2 mg on Mon, Fri; 4 mg all other days   Weekly total 24 mg   Plan last modified Noelle Harrington RN (4/6/2015)   Next INR check 4/19/2018   Priority INR   Target end date Indefinite    Indications   Atrial fibrillation (H) [I48.91]  Long term current use of anticoagulant therapy [Z79.01]         Anticoagulation Episode Summary     INR check location     Preferred lab     Send INR reminders to [facility]    Comments * warfarin 2 mg tabs. Dr Shelley Lennox is pulmonologist. Likes 9 week rechecks      Anticoagulation Care Providers     Provider Role Specialty Phone number    Marcelino Foster MD [facility] [phone]            See the Encounter Report to view Anticoagulation Flowsheet and Dosing Calendar (Go to Encounters tab in chart review, and find the Anticoagulation Therapy Visit)        Vandana Jaramillo, RODNEY

## 2018-03-05 DIAGNOSIS — G62.9 PERIPHERAL POLYNEUROPATHY: ICD-10-CM

## 2018-03-05 RX ORDER — PREGABALIN 50 MG/1
CAPSULE ORAL
Qty: 120 CAPSULE | Refills: 5 | Status: SHIPPED | OUTPATIENT
Start: 2018-03-05 | End: 2018-08-29

## 2018-03-30 ENCOUNTER — OFFICE VISIT (OUTPATIENT)
Dept: CARDIOLOGY | Facility: CLINIC | Age: 73
End: 2018-03-30
Attending: INTERNAL MEDICINE
Payer: MEDICARE

## 2018-03-30 VITALS
SYSTOLIC BLOOD PRESSURE: 113 MMHG | BODY MASS INDEX: 35.58 KG/M2 | WEIGHT: 248 LBS | OXYGEN SATURATION: 96 % | DIASTOLIC BLOOD PRESSURE: 79 MMHG | HEART RATE: 79 BPM

## 2018-03-30 DIAGNOSIS — Z95.1 POSTSURGICAL AORTOCORONARY BYPASS STATUS: ICD-10-CM

## 2018-03-30 PROCEDURE — 99214 OFFICE O/P EST MOD 30 MIN: CPT | Performed by: INTERNAL MEDICINE

## 2018-03-30 RX ORDER — CEFUROXIME AXETIL 500 MG/1
500 TABLET ORAL 2 TIMES DAILY
COMMUNITY
End: 2018-08-29

## 2018-03-30 NOTE — LETTER
"3/30/2018    Marcelino Foster MD  [address] [facility] [address]    RE: Stef Bhatt       Dear Colleague,    I had the pleasure of seeing Stef Bhatt in the [facility].    HPI and Plan:     Mr. Stef Bhatt is 72 years old and is followed for history of coronary artery disease with a history of coronary bypass and for atrial fibrillation.      More than ten years ago, Mr. Bhatt was experiencing symptoms of coronary artery disease.  He had multivessel coronary disease which resulted in a recommendation for bypass graft surgery.  Dr. Alex Joyner placed a LIMA graft to the LAD, a vein graft to the diagonal branch and a vein graft to the PDA, but the circumflex coronary artery had only mild disease and did not require grafting.      Mr. Bhatt denies any chest, neck, arm or back discomfort  other than the chest discomfort which he has always had which is a several second \"shooting\" discomfort across his lower chest.  It occurs about twice a year. We discussed it again today and he notes that it only occurs at rest. He does take nitroglycerin about twice yearly for the symptom. He has no symptoms with exertion.     His last stress nuclear study was in 2013 and there was evidence of only a small to moderate and predominantly fixed inferior/inferolateral defect with a \"very small\" nontransmural infarct and minimal keny-infarction ischemia.  His blood pressure remains well controlled  and his lipids are under excellent control with his last LDL being 57 mg/dL.  He continues on low dose aspirin, statin therapy and an ARB agent and metoprolol.      With regard to his atrial fibrillation, it is chronic.  It is well rate controlled and he is on AV gisell blockade with metoprolol.  He is also on warfarin anticoagulation given his elevated CHADS2-VASc score.  He remains on aspirin and warfarin given the need for both given the coronary disease.  He has not had any bleeding " "complications.      He also has chronic obstructive pulmonary disease.  He notes that when the weather is more humid, he has greater difficulty breathing.  He has lately followed with  Dr. Quintana now that Dr. Lennox has left the [facility].  He sleeps on a wedge due to upper airway congestion. He uses decongestants daily and he has no problems actually breathing but his nasal patent Suches\" was despite saline flushes. So for the last year or so, he has been using a wedge because then he can sleep through the night.      Exam:   GENERAL:  This is a man in no apparent distress.   VITAL SIGNS:  Blood pressure 113/79, pulse 79, weight 112.5 kg (248 lb), SpO2 96 %.  , and respiratory rate 16-20 per minute.   CHEST:  Actually free of crackles or wheezes and lung sounds were hyperechoic.  They were unlabored.   CARDIAC:  On cardiac auscultation, there is a soft and irregular S1 and S2 without extra sounds or murmur.  The rhythm was, as noted, irregularly irregular.      Assessment/Plan:   Mr. Bhatt has a history of coronary artery disease and prior revascularization.  He underwent echocardiography this time which demonstrated a small inferobasal wall motion abnormality with completely normal left ventricular systolic performance and an absence of clinically significant valvular disease.  He is on appropriate medical therapy for his coronary disease as described above and his risk factors are controlled.      With regard to the atrial fibrillation, his rate is controlled.  He is on appropriate warfarin anticoagulation.  He has no complications.  He will return as noted.     His last stress study was done than 5 years ago so I have recommended a repeat study prior to next year's visit. This will be a screening study for significant and multivessel disease which is unlikely given his history of coronary bypass surgery but nonetheless possible.     cc:   [facility]   [address] [place] [address]   [address]  "   [place]  [id number]           No orders of the defined types were placed in this encounter.      Orders Placed This Encounter   Medications     cefuroxime (CEFTIN) 500 MG tablet     Sig: Take 500 mg by mouth 2 times daily       Medications Discontinued During This Encounter   Medication Reason     amoxicillin-clavulanate (AUGMENTIN) 875-125 MG per tablet      acetaminophen (TYLENOL) 500 MG tablet          Encounter Diagnosis   Name Primary?     Postsurgical aortocoronary bypass status        CURRENT MEDICATIONS:  Current Outpatient Prescriptions   Medication Sig Dispense Refill     cefuroxime (CEFTIN) 500 MG tablet Take 500 mg by mouth 2 times daily       pregabalin (LYRICA) 50 MG capsule Take 2 in the am, 2 in the pm. 120 capsule 5     nitroGLYcerin (NITROSTAT) 0.4 MG sublingual tablet Place 1 tablet (0.4 mg) under the tongue every 5 minutes as needed for chest pain 25 tablet 11     umeclidinium-vilanterol (ANORO ELLIPTA) 62.5-25 MCG/INH oral inhaler Inhale 1 puff into the lungs daily       losartan (COZAAR) 50 MG tablet Take 1 tablet (50 mg) by mouth daily 90 tablet 3     metoprolol (TOPROL-XL) 50 MG 24 hr tablet 100 mg in am, 50 mg in pm 270 tablet 3     simvastatin (ZOCOR) 20 MG tablet Take 1 tablet (20 mg) by mouth daily 90 tablet 3     glipiZIDE (GLIPIZIDE XL) 10 MG 24 hr tablet Take 2 in the am 180 tablet 3     metFORMIN (GLUCOPHAGE) 500 MG tablet Take 1 tablet (500 mg) by mouth daily (with dinner) 90 tablet 3     warfarin (COUMADIN) 2 MG tablet As directed by Anticoagulation Clinic (current dose 2mg MF, 4mg rest of the week) 150 tablet 3     ketoconazole (NIZORAL) 2 % shampoo Apply daily then rinse. Pt will call to order 120 mL 11     ipratropium - albuterol 0.5 mg/2.5 mg/3 mL (DUONEB) 0.5-2.5 (3) MG/3ML nebulization Take 1 vial by nebulization every 6 hours as needed for shortness of breath / dyspnea or wheezing       predniSONE (DELTASONE) 10 MG tablet Take 10 mg by mouth daily        diphenhydrAMINE-acetaminophen (TYLENOL PM EXTRA STRENGTH)  MG tablet Take 1 tablet by mouth nightly as needed for sleep       albuterol (VENTOLIN HFA) 108 (90 BASE) MCG/ACT inhaler Inhale 2 puffs into the lungs every 4 hours as needed Pt will call to order 1 Inhaler 11     blood glucose (ACCU-CHEK COMPACT DRUM) test strip Use to test blood sugar three times daily or as directed. 270 strip 1     ascorbic acid (VITAMIN C) 1000 MG TABS Take 1,000 mg by mouth daily In winter only       PSYLLIUM HUSK        guaiFENesin (MUCINEX) 600 MG 12 hr tablet Take 400-600 mg by mouth 2 times daily        glucose blood VI test strips strip Use as directed to check sugars twice a day 3 Month 3     fexofenadine (ALLEGRA) 180 MG tablet Take 180 mg by mouth every evening  30 tablet 1     aspirin 81 MG tablet Take 81 mg by mouth every evening. 1 tablet 3     ACCU-CHEK SOFTCLIX LANCETS MISC use as directed to test blood sugars up to once daily; pt will call to order 100 Each 11       ALLERGIES     Allergies   Allergen Reactions     Sulfa Drugs Rash     Rash on chest     Versed Other (See Comments)     agitation     Captopril Fatigue     dizziness     Cimetidine Other (See Comments)     Tagamet - reaction unknown     Lisinopril Other (See Comments) and Fatigue     dizziness       PAST MEDICAL HISTORY:  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     Obesity, unspecified      Pure hypercholesterolemia      Tobacco use disorder     quit in January 2005     Trigger finger (acquired)      Type II or unspecified type diabetes mellitus without mention of complication, not stated as uncontrolled        PAST SURGICAL HISTORY:  Past Surgical History:   Procedure Laterality Date     COLONOSCOPY  1/27/2011    COMBINED COLONOSCOPY, REMOVE TUMOR/POLYP/LESION BY SNARE performed by MARIUSZ ELIZALDE at  [facility]     CORONARY ARTERY BYPASS  dec 2006     FLEXIBLE SIGMOIDOSCOPY  7/15/05     PHACOEMULSIFICATION WITH STANDARD INTRAOCULAR LENS IMPLANT  1/19/2012    Procedure:PHACOEMULSIFICATION WITH STANDARD INTRAOCULAR LENS IMPLANT; Right Kelman Phacoemulsification with introcular lens implant ; Surgeon:ORBY DORADO; Location:[facility]     PHACOEMULSIFICATION WITH STANDARD INTRAOCULAR LENS IMPLANT  2/2/2012    Procedure:PHACOEMULSIFICATION WITH STANDARD INTRAOCULAR LENS IMPLANT; Left Kelman Phacoemulsification with introcular lens implant ; Surgeon:ROBY DORADO; Location:[facility]     SURGICAL HISTORY OF -       chay     SURGICAL HISTORY OF -       choledochocystotomy     SURGICAL HISTORY OF -       lumbar     SURGICAL HISTORY OF -   8/94    colonoscopy       FAMILY HISTORY:  Family History   Problem Relation Age of Onset     DIABETES Brother      Breast Cancer Sister      CANCER Sister      lymph nodes     HEART DISEASE Father        SOCIAL HISTORY:  Social History     Social History     Marital status:      Spouse name: N/A     Number of children: N/A     Years of education: N/A     Social History Main Topics     Smoking status: Former Smoker     Packs/day: 1.00     Types: Cigarettes, Cigars     Quit date: 1/1/2006     Smokeless tobacco: Never Used      Comment: cigar once in a blue moon-quit again about 6 weeks ago     Alcohol use Yes      Comment: occasional     Drug use: No     Sexual activity: Not Asked     Other Topics Concern     Parent/Sibling W/ Cabg, Mi Or Angioplasty Before 65f 55m? No     Social History Narrative       Review of Systems:  Skin:  Positive for bruising     Eyes:  Negative      ENT:  Positive for hoarseness    Respiratory:  Positive for dyspnea on exertion;shortness of breath;cough COPD   Cardiovascular:    Positive for;chest pain;palpitations;edema;lower extremity symptoms;fatigue    Gastroenterology: Negative      Genitourinary:  Negative      Musculoskeletal:  Positive for back  pain;joint pain;arthritis;joint swelling;joint stiffness    Neurologic:  Positive for headaches;numbness or tingling of feet neuropathy  Psychiatric:  Negative      Heme/Lymph/Imm:  Negative      Endocrine:  Positive for diabetes      Physical Exam:  Vitals: /79  Pulse 79  Wt 112.5 kg (248 lb)  SpO2 96%  BMI 35.58 kg/m2    Constitutional:  cooperative, alert and oriented, well developed, well nourished, in no acute distress        Skin:  warm and dry to the touch          Head:  normocephalic        Eyes:           Lymph:      ENT:           Neck:           Respiratory:  normal breath sounds, clear to auscultation, normal A-P diameter, normal symmetry, normal respiratory excursion, no use of accessory muscles         Cardiac: regular rhythm;normal S1 and S2;no S3 or S4 irregularly irregular rhythm                                                       GI:           Extremities and Muscular Skeletal:  no deformities, clubbing, cyanosis, erythema observed              Neurological:           Psych:  Alert and Oriented x 3;affect appropriate, oriented to time, person and place        Thank you for allowing me to participate in the care of your patient.    Sincerely,     Lillian Machado MD     [facility]

## 2018-03-30 NOTE — MR AVS SNAPSHOT
"              After Visit Summary   3/30/2018    Stef Bhatt    MRN: [id number]           Patient Information     Date Of Birth          1945        Visit Information        Provider Department      3/30/2018 10:30 AM Lillian Machado MD [facility]        Today's Diagnoses     Postsurgical aortocoronary bypass status           Follow-ups after your visit        Your next 10 appointments already scheduled     Apr 19, 2018  1:15 PM CDT   [facility] with [facility]   [facility]    [address] [facility] [address]   [phone]              Future tests that were ordered for you today     Open Future Orders        Priority Expected Expires Ordered    NM Lexiscan stress test (nuc card) Routine 4/29/2018 3/30/2019 3/30/2018            Who to contact     If you have questions or need follow up information about today's clinic visit or your schedule please contact [facility] directly at [phone].  Normal or non-critical lab and imaging results will be communicated to you by [employer], letter or phone within 4 business days after the clinic has received the results. If you do not hear from us within 7 days, please contact the clinic through [employer] or phone. If you have a critical or abnormal lab result, we will notify you by phone as soon as possible.  Submit refill requests through [employer] or call your pharmacy and they will forward the refill request to us. Please allow 3 business days for your refill to be completed.          Additional Information About Your Visit        MyChart Information     [employer] lets you send messages to your doctor, view your test results, renew your prescriptions, schedule appointments and more. To sign up, go to [email or web address] . Click on \"Log in\" on the left side of the screen, which will take you to the Welcome page. Then " "click on \"Sign up Now\" on the right side of the page.     You will be asked to enter the access code listed below, as well as some personal information. Please follow the directions to create your username and password.     Your access code is: [email or web address]  Expires: 2018 11:03 AM     Your access code will  in 90 days. If you need help or a new code, please call your [place] clinic or [phone].        Care EveryWhere ID     This is your Care EveryWhere ID. This could be used by other organizations to access your [place] medical records  [id number]        Your Vitals Were     Pulse Pulse Oximetry BMI (Body Mass Index)             79 96% 35.58 kg/m2          Blood Pressure from Last 3 Encounters:   18 113/79   17 125/83   17 113/53    Weight from Last 3 Encounters:   18 112.5 kg (248 lb)   17 113.9 kg (251 lb)   17 112.5 kg (248 lb)              We Performed the Following     Follow-Up with Cardiologist          Today's Medication Changes          These changes are accurate as of 3/30/18 11:03 AM.  If you have any questions, ask your nurse or doctor.               Stop taking these medicines if you haven't already. Please contact your care team if you have questions.     amoxicillin-clavulanate 875-125 MG per tablet   Commonly known as:  AUGMENTIN   Stopped by:  Lillian Machado MD           TYLENOL 500 MG tablet   Generic drug:  acetaminophen   Stopped by:  Lillian Machado MD                    Primary Care Provider Office Phone # Fax #    Marcelino Roque Foster [phone] [address] [address]        Equal Access to Services     [facility]: Hadii janna Raza, wadmitrida ludangadaha, qaybta kaalmada grisel, susanne padilla. So [facility] [phone].    ATENCIÓN: Si habla español, tiene a chau disposición servicios gratuitos de asistencia lingüística. Llame al [phone].    We comply with " applicable federal civil rights laws and [place] laws. We do not discriminate on the basis of race, color, national origin, age, disability, sex, sexual orientation, or gender identity.            Thank you!     Thank you for choosing [facility]  for your care. Our goal is always to provide you with excellent care. Hearing back from our patients is one way we can continue to improve our services. Please take a few minutes to complete the written survey that you may receive in the mail after your visit with us. Thank you!             Your Updated Medication List - Protect others around you: Learn how to safely use, store and throw away your medicines at www.disposemymeds.org.          This list is accurate as of 3/30/18 11:03 AM.  Always use your most recent med list.                   Brand Name Dispense Instructions for use Diagnosis    albuterol 108 (90 BASE) MCG/ACT Inhaler    VENTOLIN HFA    1 Inhaler    Inhale 2 puffs into the lungs every 4 hours as needed Pt will call to order    COPD (chronic obstructive pulmonary disease) (H)       ALLEGRA 180 MG tablet   Generic drug:  fexofenadine     30 tablet    Take 180 mg by mouth every evening        ascorbic acid 1000 MG Tabs    vitamin C     Take 1,000 mg by mouth daily In winter only        aspirin 81 MG tablet     1 tablet    Take 81 mg by mouth every evening.        blood glucose monitoring lancets     100 Each    use as directed to test blood sugars up to once daily; pt will call to order    Type II or unspecified type diabetes mellitus without mention of complication, not stated as uncontrolled       * blood glucose monitoring test strip    no brand specified    3 Month    Use as directed to check sugars twice a day    Type II or unspecified type diabetes mellitus without mention of complication, not stated as uncontrolled       * blood glucose monitoring test strip    ACCU-CHEK COMPACT DRUM    270 strip    Use to test  blood sugar three times daily or as directed.    Obesity, unspecified, Type 2 diabetes, HbA1C goal < 8% (H)       cefuroxime 500 MG tablet    CEFTIN     Take 500 mg by mouth 2 times daily        glipiZIDE 10 MG 24 hr tablet    glipiZIDE XL    180 tablet    Take 2 in the am    Type 2 diabetes mellitus without complication, without long-term current use of insulin (H)       ipratropium - albuterol 0.5 mg/2.5 mg/3 mL 0.5-2.5 (3) MG/3ML neb solution    DUONEB     Take 1 vial by nebulization every 6 hours as needed for shortness of breath / dyspnea or wheezing        ketoconazole 2 % shampoo    NIZORAL    120 mL    Apply daily then rinse. Pt will call to order    Seborrheic dermatitis       losartan 50 MG tablet    COZAAR    90 tablet    Take 1 tablet (50 mg) by mouth daily    Essential hypertension with goal blood pressure less than 130/80       metFORMIN 500 MG tablet    GLUCOPHAGE    90 tablet    Take 1 tablet (500 mg) by mouth daily (with dinner)    Type 2 diabetes mellitus without complication, without long-term current use of insulin (H)       metoprolol succinate 50 MG 24 hr tablet    TOPROL-XL    270 tablet    100 mg in am, 50 mg in pm    Essential hypertension with goal blood pressure less than 130/80       MUCINEX 600 MG 12 hr tablet   Generic drug:  guaiFENesin      Take 400-600 mg by mouth 2 times daily        nitroGLYcerin 0.4 MG sublingual tablet    NITROSTAT    25 tablet    Place 1 tablet (0.4 mg) under the tongue every 5 minutes as needed for chest pain    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    Peripheral polyneuropathy       PSYLLIUM HUSK           simvastatin 20 MG tablet    ZOCOR    90 tablet    Take 1 tablet (20 mg) by mouth daily     Hyperlipidemia with target LDL less than 100       TYLENOL PM EXTRA STRENGTH  MG tablet   Generic drug:  diphenhydrAMINE-acetaminophen      Take 1 tablet by mouth nightly as needed for sleep        umeclidinium-vilanterol 62.5-25 MCG/INH oral inhaler    ANORO ELLIPTA     Inhale 1 puff into the lungs daily        warfarin 2 MG tablet    COUMADIN    150 tablet    As directed by Anticoagulation Clinic (current dose 2mg MF, 4mg rest of the week)    Long term (current) use of anticoagulants       * Notice:  This list has 2 medication(s) that are the same as other medications prescribed for you. Read the directions carefully, and ask your doctor or other care provider to review them with you.

## 2018-03-30 NOTE — PROGRESS NOTES
"HPI and Plan:     Mr. Stef Bhatt is 72 years old and is followed for history of coronary artery disease with a history of coronary bypass and for atrial fibrillation.      More than ten years ago, Mr. Bhatt was experiencing symptoms of coronary artery disease.  He had multivessel coronary disease which resulted in a recommendation for bypass graft surgery.  Dr. Alex Joyner placed a LIMA graft to the LAD, a vein graft to the diagonal branch and a vein graft to the PDA, but the circumflex coronary artery had only mild disease and did not require grafting.      Mr. Bhatt denies any chest, neck, arm or back discomfort  other than the chest discomfort which he has always had which is a several second \"shooting\" discomfort across his lower chest.  It occurs about twice a year. We discussed it again today and he notes that it only occurs at rest. He does take nitroglycerin about twice yearly for the symptom. He has no symptoms with exertion.     His last stress nuclear study was in 2013 and there was evidence of only a small to moderate and predominantly fixed inferior/inferolateral defect with a \"very small\" nontransmural infarct and minimal keny-infarction ischemia.  His blood pressure remains well controlled  and his lipids are under excellent control with his last LDL being 57 mg/dL.  He continues on low dose aspirin, statin therapy and an ARB agent and metoprolol.      With regard to his atrial fibrillation, it is chronic.  It is well rate controlled and he is on AV gisell blockade with metoprolol.  He is also on warfarin anticoagulation given his elevated CHADS2-VASc score.  He remains on aspirin and warfarin given the need for both given the coronary disease.  He has not had any bleeding complications.      He also has chronic obstructive pulmonary disease.  He notes that when the weather is more humid, he has greater difficulty breathing.  He has lately followed with  Dr. Quintana now that Dr. Lennox has left " "the [facility].  He sleeps on a wedge due to upper airway congestion. He uses decongestants daily and he has no problems actually breathing but his nasal patent Suches\" was despite saline flushes. So for the last year or so, he has been using a wedge because then he can sleep through the night.      Exam:   GENERAL:  This is a man in no apparent distress.   VITAL SIGNS:  Blood pressure 113/79, pulse 79, weight 112.5 kg (248 lb), SpO2 96 %.  , and respiratory rate 16-20 per minute.   CHEST:  Actually free of crackles or wheezes and lung sounds were hyperechoic.  They were unlabored.   CARDIAC:  On cardiac auscultation, there is a soft and irregular S1 and S2 without extra sounds or murmur.  The rhythm was, as noted, irregularly irregular.      Assessment/Plan:   Mr. Bhatt has a history of coronary artery disease and prior revascularization.  He underwent echocardiography this time which demonstrated a small inferobasal wall motion abnormality with completely normal left ventricular systolic performance and an absence of clinically significant valvular disease.  He is on appropriate medical therapy for his coronary disease as described above and his risk factors are controlled.      With regard to the atrial fibrillation, his rate is controlled.  He is on appropriate warfarin anticoagulation.  He has no complications.  He will return as noted.     His last stress study was done than 5 years ago so I have recommended a repeat study prior to next year's visit. This will be a screening study for significant and multivessel disease which is unlikely given his history of coronary bypass surgery but nonetheless possible.     cc:   [facility]   [address] [place] [address]  [id number]           No orders of the defined types were placed in this encounter.      Orders Placed This Encounter   Medications     cefuroxime (CEFTIN) 500 MG tablet     Sig: Take 500 mg by mouth 2 times daily "       Medications Discontinued During This Encounter   Medication Reason     amoxicillin-clavulanate (AUGMENTIN) 875-125 MG per tablet      acetaminophen (TYLENOL) 500 MG tablet          Encounter Diagnosis   Name Primary?     Postsurgical aortocoronary bypass status        CURRENT MEDICATIONS:  Current Outpatient Prescriptions   Medication Sig Dispense Refill     cefuroxime (CEFTIN) 500 MG tablet Take 500 mg by mouth 2 times daily       pregabalin (LYRICA) 50 MG capsule Take 2 in the am, 2 in the pm. 120 capsule 5     nitroGLYcerin (NITROSTAT) 0.4 MG sublingual tablet Place 1 tablet (0.4 mg) under the tongue every 5 minutes as needed for chest pain 25 tablet 11     umeclidinium-vilanterol (ANORO ELLIPTA) 62.5-25 MCG/INH oral inhaler Inhale 1 puff into the lungs daily       losartan (COZAAR) 50 MG tablet Take 1 tablet (50 mg) by mouth daily 90 tablet 3     metoprolol (TOPROL-XL) 50 MG 24 hr tablet 100 mg in am, 50 mg in pm 270 tablet 3     simvastatin (ZOCOR) 20 MG tablet Take 1 tablet (20 mg) by mouth daily 90 tablet 3     glipiZIDE (GLIPIZIDE XL) 10 MG 24 hr tablet Take 2 in the am 180 tablet 3     metFORMIN (GLUCOPHAGE) 500 MG tablet Take 1 tablet (500 mg) by mouth daily (with dinner) 90 tablet 3     warfarin (COUMADIN) 2 MG tablet As directed by Anticoagulation Clinic (current dose 2mg MF, 4mg rest of the week) 150 tablet 3     ketoconazole (NIZORAL) 2 % shampoo Apply daily then rinse. Pt will call to order 120 mL 11     ipratropium - albuterol 0.5 mg/2.5 mg/3 mL (DUONEB) 0.5-2.5 (3) MG/3ML nebulization Take 1 vial by nebulization every 6 hours as needed for shortness of breath / dyspnea or wheezing       predniSONE (DELTASONE) 10 MG tablet Take 10 mg by mouth daily       diphenhydrAMINE-acetaminophen (TYLENOL PM EXTRA STRENGTH)  MG tablet Take 1 tablet by mouth nightly as needed for sleep       albuterol (VENTOLIN HFA) 108 (90 BASE) MCG/ACT inhaler Inhale 2 puffs into the lungs every 4 hours as needed Pt  will call to order 1 Inhaler 11     blood glucose (ACCU-CHEK COMPACT DRUM) test strip Use to test blood sugar three times daily or as directed. 270 strip 1     ascorbic acid (VITAMIN C) 1000 MG TABS Take 1,000 mg by mouth daily In winter only       PSYLLIUM HUSK        guaiFENesin (MUCINEX) 600 MG 12 hr tablet Take 400-600 mg by mouth 2 times daily        glucose blood VI test strips strip Use as directed to check sugars twice a day 3 Month 3     fexofenadine (ALLEGRA) 180 MG tablet Take 180 mg by mouth every evening  30 tablet 1     aspirin 81 MG tablet Take 81 mg by mouth every evening. 1 tablet 3     ACCU-CHEK SOFTCLIX LANCETS MISC use as directed to test blood sugars up to once daily; pt will call to order 100 Each 11       ALLERGIES     Allergies   Allergen Reactions     Sulfa Drugs Rash     Rash on chest     Versed Other (See Comments)     agitation     Captopril Fatigue     dizziness     Cimetidine Other (See Comments)     Tagamet - reaction unknown     Lisinopril Other (See Comments) and Fatigue     dizziness       PAST MEDICAL HISTORY:  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     Obesity, unspecified      Pure hypercholesterolemia      Tobacco use disorder     quit in January 2005     Trigger finger (acquired)      Type II or unspecified type diabetes mellitus without mention of complication, not stated as uncontrolled        PAST SURGICAL HISTORY:  Past Surgical History:   Procedure Laterality Date     COLONOSCOPY  1/27/2011    COMBINED COLONOSCOPY, REMOVE TUMOR/POLYP/LESION BY SNARE performed by MARIUSZ ELIZALDE at [facility]     CORONARY ARTERY BYPASS  dec 2006     FLEXIBLE SIGMOIDOSCOPY  7/15/05     PHACOEMULSIFICATION WITH STANDARD INTRAOCULAR LENS IMPLANT  1/19/2012    Procedure:PHACOEMULSIFICATION WITH STANDARD INTRAOCULAR LENS IMPLANT; Right Anabel  Phacoemulsification with introcular lens implant ; Surgeon:ROBY DORADO; Location:[facility]     PHACOEMULSIFICATION WITH STANDARD INTRAOCULAR LENS IMPLANT  2/2/2012    Procedure:PHACOEMULSIFICATION WITH STANDARD INTRAOCULAR LENS IMPLANT; Left Kelman Phacoemulsification with introcular lens implant ; Surgeon:ROBY DORADO; Location:[facility]     SURGICAL HISTORY OF -       chay     SURGICAL HISTORY OF -       choledochocystotomy     SURGICAL HISTORY OF -       lumbar     SURGICAL HISTORY OF -   8/94    colonoscopy       FAMILY HISTORY:  Family History   Problem Relation Age of Onset     DIABETES Brother      Breast Cancer Sister      CANCER Sister      lymph nodes     HEART DISEASE Father        SOCIAL HISTORY:  Social History     Social History     Marital status:      Spouse name: N/A     Number of children: N/A     Years of education: N/A     Social History Main Topics     Smoking status: Former Smoker     Packs/day: 1.00     Types: Cigarettes, Cigars     Quit date: 1/1/2006     Smokeless tobacco: Never Used      Comment: cigar once in a blue moon-quit again about 6 weeks ago     Alcohol use Yes      Comment: occasional     Drug use: No     Sexual activity: Not Asked     Other Topics Concern     Parent/Sibling W/ Cabg, Mi Or Angioplasty Before 65f 55m? No     Social History Narrative       Review of Systems:  Skin:  Positive for bruising     Eyes:  Negative      ENT:  Positive for hoarseness    Respiratory:  Positive for dyspnea on exertion;shortness of breath;cough COPD   Cardiovascular:    Positive for;chest pain;palpitations;edema;lower extremity symptoms;fatigue    Gastroenterology: Negative      Genitourinary:  Negative      Musculoskeletal:  Positive for back pain;joint pain;arthritis;joint swelling;joint stiffness    Neurologic:  Positive for headaches;numbness or tingling of feet neuropathy  Psychiatric:  Negative      Heme/Lymph/Imm:  Negative      Endocrine:  Positive for diabetes       Physical Exam:  Vitals: /79  Pulse 79  Wt 112.5 kg (248 lb)  SpO2 96%  BMI 35.58 kg/m2    Constitutional:  cooperative, alert and oriented, well developed, well nourished, in no acute distress        Skin:  warm and dry to the touch          Head:  normocephalic        Eyes:           Lymph:      ENT:           Neck:           Respiratory:  normal breath sounds, clear to auscultation, normal A-P diameter, normal symmetry, normal respiratory excursion, no use of accessory muscles         Cardiac: regular rhythm;normal S1 and S2;no S3 or S4 irregularly irregular rhythm                                                       GI:           Extremities and Muscular Skeletal:  no deformities, clubbing, cyanosis, erythema observed              Neurological:           Psych:  Alert and Oriented x 3;affect appropriate, oriented to time, person and place          CC  Lillian Machado MD  [address]  [place] [address]

## 2018-04-19 ENCOUNTER — ANTICOAGULATION THERAPY VISIT (OUTPATIENT)
Dept: ANTICOAGULATION | Facility: CLINIC | Age: 73
End: 2018-04-19
Payer: MEDICARE

## 2018-04-19 DIAGNOSIS — Z79.01 LONG TERM CURRENT USE OF ANTICOAGULANT THERAPY: ICD-10-CM

## 2018-04-19 DIAGNOSIS — I48.91 ATRIAL FIBRILLATION (H): ICD-10-CM

## 2018-04-19 LAB — INR POINT OF CARE: 3.3 (ref 0.86–1.14)

## 2018-04-19 PROCEDURE — 85610 PROTHROMBIN TIME: CPT | Mod: QW

## 2018-04-19 PROCEDURE — 36416 COLLJ CAPILLARY BLOOD SPEC: CPT

## 2018-04-19 PROCEDURE — 99207 ZZC NO CHARGE NURSE ONLY: CPT

## 2018-04-19 NOTE — PROGRESS NOTES
"    ANTICOAGULATION FOLLOW-UP CLINIC VISIT    Patient Name:  Stef Bhatt  Date:  4/19/2018  Contact Type:  Face to Face    SUBJECTIVE:     Patient Findings     Positives No Problem Findings    Comments Pt denies changes in medications, activity or health, reports taking warfarin as directed. Pt reports he had \"2 stiff drinks a couple days ago, maybe should have only had one\".             OBJECTIVE    INR Protime   Date Value Ref Range Status   04/19/2018 3.3 (A) 0.86 - 1.14 Final       ASSESSMENT / PLAN  INR assessment SUPRA    Recheck INR In: 9 WEEKS    INR Location Clinic      Anticoagulation Summary as of 4/19/2018     INR goal 2.0-3.0   Today's INR 3.3!   Maintenance plan 2 mg (2 mg x 1) on Mon, Fri; 4 mg (2 mg x 2) all other days   Full instructions 2 mg on Mon, Fri; 4 mg all other days   Weekly total 24 mg   Plan last modified Noelle Harrington RN (4/6/2015)   Next INR check 6/21/2018   Priority INR   Target end date Indefinite    Indications   Atrial fibrillation (H) [I48.91]  Long term current use of anticoagulant therapy [Z79.01]         Anticoagulation Episode Summary     INR check location     Preferred lab     Send INR reminders to [facility]    Comments * warfarin 2 mg tabs. Dr Shelley Lennox is pulmonologist. Likes 9 week rechecks      Anticoagulation Care Providers     Provider Role Specialty Phone number    Marcelino Foster MD [facility] [phone]            See the Encounter Report to view Anticoagulation Flowsheet and Dosing Calendar (Go to Encounters tab in chart review, and find the Anticoagulation Therapy Visit)        Vandana Jaramillo RN               "

## 2018-04-19 NOTE — MR AVS SNAPSHOT
Stef Bhatt   4/19/2018 1:15 PM   Anticoagulation Therapy Visit    Description:  72 year old male   Provider:  [facility]   Department:  [facility]           INR as of 4/19/2018     Today's INR 3.3!      Anticoagulation Summary as of 4/19/2018     INR goal 2.0-3.0   Today's INR 3.3!   Full instructions 2 mg on Mon, Fri; 4 mg all other days   Next INR check 6/21/2018    Indications   Atrial fibrillation (H) [I48.91]  Long term current use of anticoagulant therapy [Z79.01]         Your next Anticoagulation Clinic appointment(s)     Jun 21, 2018  1:15 PM CDT   Anticoagulation Visit with [facility]   [facility] ([facility]    [address] [facility] [facility] [address] [phone]              Contact Numbers     Please call [phone] with any problems or questions regarding your therapy.    If you need to cancel and/or reschedule your appointment please call one of the following numbers:  [facility] [phone]  [place] - [phone]  [facility] [phone]  [facility] [phone]  [place] - [phone]            April 2018 Details    Sun Mon Tue Wed Thu Fri Sat     1               2               3               4               5               6               7                 8               9               10               11               12               13               14                 15               16               17               18               19      4 mg   See details      20      2 mg         21      4 mg           22      4 mg         23      2 mg         24      4 mg         25      4 mg         26      4 mg         27      2 mg         28      4 mg           29      4 mg         30      2 mg               Date Details   04/19 This INR check               How to take your warfarin dose     To take:  2 mg Take 1 of the 2 mg tablets.    To take:  4 mg Take 2 of the 2 mg tablets.           May 2018 Details    Sun Mon Tue Wed Thu Fri  Sat       1      4 mg         2      4 mg         3      4 mg         4      2 mg         5      4 mg           6      4 mg         7      2 mg         8      4 mg         9      4 mg         10      4 mg         11      2 mg         12      4 mg           13      4 mg         14      2 mg         15      4 mg         16      4 mg         17      4 mg         18      2 mg         19      4 mg           20      4 mg         21      2 mg         22      4 mg         23      4 mg         24      4 mg         25      2 mg         26      4 mg           27      4 mg         28      2 mg         29      4 mg         30      4 mg         31      4 mg            Date Details   No additional details            How to take your warfarin dose     To take:  2 mg Take 1 of the 2 mg tablets.    To take:  4 mg Take 2 of the 2 mg tablets.           June 2018 Details    Sun Mon Tue Wed Thu Fri Sat          1      2 mg         2      4 mg           3      4 mg         4      2 mg         5      4 mg         6      4 mg         7      4 mg         8      2 mg         9      4 mg           10      4 mg         11      2 mg         12      4 mg         13      4 mg         14      4 mg         15      2 mg         16      4 mg           17      4 mg         18      2 mg         19      4 mg         20      4 mg         21            22               23                 24               25               26               27               28               29               30                Date Details   No additional details    Date of next INR:  6/21/2018         How to take your warfarin dose     To take:  2 mg Take 1 of the 2 mg tablets.    To take:  4 mg Take 2 of the 2 mg tablets.

## 2018-04-26 ENCOUNTER — TELEPHONE (OUTPATIENT)
Dept: ANTICOAGULATION | Facility: CLINIC | Age: 73
End: 2018-04-26

## 2018-04-26 DIAGNOSIS — Z79.01 LONG TERM CURRENT USE OF ANTICOAGULANT THERAPY: ICD-10-CM

## 2018-04-26 DIAGNOSIS — I48.20 CHRONIC ATRIAL FIBRILLATION (H): Primary | ICD-10-CM

## 2018-04-26 RX ORDER — WARFARIN SODIUM 2 MG/1
TABLET ORAL
Qty: 150 TABLET | Refills: 3 | Status: SHIPPED | OUTPATIENT
Start: 2018-04-26 | End: 2019-03-29

## 2018-04-26 NOTE — TELEPHONE ENCOUNTER
90 day refill ordered per [facility] protocol, sent to [facility] in [place], [place].    Vandana Jaramillo RN  Anticoagulation Clinic  [phone]

## 2018-04-27 ENCOUNTER — TRANSFERRED RECORDS (OUTPATIENT)
Dept: HEALTH INFORMATION MANAGEMENT | Facility: CLINIC | Age: 73
End: 2018-04-27

## 2018-04-27 DIAGNOSIS — E11.40 TYPE 2 DIABETES MELLITUS WITH DIABETIC NEUROPATHY, WITHOUT LONG-TERM CURRENT USE OF INSULIN (H): ICD-10-CM

## 2018-04-27 LAB
CREAT SERPL-MCNC: 1.08 MG/DL (ref 0.66–1.25)
GFR SERPL CREATININE-BSD FRML MDRD: 67 ML/MIN/1.7M2
HBA1C MFR BLD: 8.1 % (ref 0–5.6)
TSH SERPL DL<=0.005 MIU/L-ACNC: 1.93 MU/L (ref 0.4–4)

## 2018-04-27 PROCEDURE — 83036 HEMOGLOBIN GLYCOSYLATED A1C: CPT | Performed by: FAMILY MEDICINE

## 2018-04-27 PROCEDURE — 82565 ASSAY OF CREATININE: CPT | Performed by: FAMILY MEDICINE

## 2018-04-27 PROCEDURE — 36415 COLL VENOUS BLD VENIPUNCTURE: CPT | Performed by: FAMILY MEDICINE

## 2018-04-27 PROCEDURE — 84443 ASSAY THYROID STIM HORMONE: CPT | Performed by: FAMILY MEDICINE

## 2018-05-03 ENCOUNTER — OFFICE VISIT (OUTPATIENT)
Dept: FAMILY MEDICINE | Facility: CLINIC | Age: 73
End: 2018-05-03
Payer: MEDICARE

## 2018-05-03 VITALS
HEIGHT: 70 IN | HEART RATE: 76 BPM | DIASTOLIC BLOOD PRESSURE: 69 MMHG | SYSTOLIC BLOOD PRESSURE: 136 MMHG | TEMPERATURE: 97.6 F | OXYGEN SATURATION: 94 % | BODY MASS INDEX: 35.07 KG/M2 | WEIGHT: 245 LBS

## 2018-05-03 DIAGNOSIS — E78.5 HYPERLIPIDEMIA WITH TARGET LDL LESS THAN 100: ICD-10-CM

## 2018-05-03 DIAGNOSIS — E11.9 TYPE 2 DIABETES MELLITUS WITHOUT COMPLICATION, WITHOUT LONG-TERM CURRENT USE OF INSULIN (H): ICD-10-CM

## 2018-05-03 DIAGNOSIS — L98.9 SKIN LESION: Primary | ICD-10-CM

## 2018-05-03 DIAGNOSIS — I10 ESSENTIAL HYPERTENSION WITH GOAL BLOOD PRESSURE LESS THAN 130/80: ICD-10-CM

## 2018-05-03 DIAGNOSIS — E11.40 TYPE 2 DIABETES MELLITUS WITH DIABETIC NEUROPATHY, WITHOUT LONG-TERM CURRENT USE OF INSULIN (H): ICD-10-CM

## 2018-05-03 PROCEDURE — 99214 OFFICE O/P EST MOD 30 MIN: CPT | Performed by: FAMILY MEDICINE

## 2018-05-03 RX ORDER — LOSARTAN POTASSIUM 50 MG/1
50 TABLET ORAL DAILY
Qty: 90 TABLET | Refills: 3 | Status: SHIPPED | OUTPATIENT
Start: 2018-05-03 | End: 2019-03-29

## 2018-05-03 RX ORDER — ACARBOSE 25 MG/1
25 TABLET ORAL
Qty: 90 TABLET | Refills: 11 | Status: SHIPPED | OUTPATIENT
Start: 2018-05-03 | End: 2019-05-23

## 2018-05-03 RX ORDER — METOPROLOL SUCCINATE 50 MG/1
TABLET, EXTENDED RELEASE ORAL
Qty: 270 TABLET | Refills: 3 | Status: SHIPPED | OUTPATIENT
Start: 2018-05-03 | End: 2019-03-29

## 2018-05-03 RX ORDER — GLIPIZIDE 10 MG/1
TABLET, FILM COATED, EXTENDED RELEASE ORAL
Qty: 180 TABLET | Refills: 3 | Status: SHIPPED | OUTPATIENT
Start: 2018-05-03 | End: 2019-03-29

## 2018-05-03 RX ORDER — SIMVASTATIN 20 MG
20 TABLET ORAL DAILY
Qty: 90 TABLET | Refills: 3 | Status: SHIPPED | OUTPATIENT
Start: 2018-05-03 | End: 2019-03-29

## 2018-05-03 NOTE — PROGRESS NOTES
SUBJECTIVE:   Stef Bhatt is a 72 year old male who presents to clinic today for the following health issues:      Diabetes Follow-up  He did stop the Metformin around January 20th.  This was due to having diarrhea.  Also with being on Prednisone and Cefuroxime.  He would like to discuss about a different medication to replace the Metformin.      Patient is checking blood sugars: He is checking and they can vary due to what medications he is taking such as Prednisone and Cefuroxime.  One day at 3:00 in there afternoon it was 179.  That was when not eating.    3 1/2 hours after last food-has been 163, 168.    Diabetic concerns: other - That he is no longer able to take the Metformin due to side effects.  Would like to discuss about other options.     Symptoms of hypoglycemia (low blood sugar): Before January he would have some episodes of being lower when on the Metformin.  Would have OJ and glucose tablets.      Paresthesias (numbness or burning in feet) or sores: Yes Have been numb for a long time.  Some burning for the left foot.  He does have a sore around the left heel area.     Date of last diabetic eye exam: Recent visit that was normal.    BP Readings from Last 2 Encounters:   03/30/18 113/79   09/14/17 125/83     Hemoglobin A1C (%)   Date Value   04/27/2018 8.1 (H)   09/12/2017 7.5 (H)     LDL Cholesterol Calculated (mg/dL)   Date Value   09/12/2017 57   08/24/2016 54       Amount of exercise or physical activity: Not much due to the COPD.    Problems taking medications regularly: No    Medication side effects: Diarrhea-Metformin.    Diet: carbohydrate counting-yes and no.        Current Outpatient Prescriptions:      ACCU-CHEK SOFTCLIX LANCETS MISC, use as directed to test blood sugars up to once daily; pt will call to order, Disp: 100 Each, Rfl: 11     albuterol (VENTOLIN HFA) 108 (90 BASE) MCG/ACT inhaler, Inhale 2 puffs into the lungs every 4 hours as needed Pt will call to order, Disp: 1 Inhaler,  Rfl: 11     aspirin 81 MG tablet, Take 81 mg by mouth every evening., Disp: 1 tablet, Rfl: 3     blood glucose (ACCU-CHEK COMPACT DRUM) test strip, Use to test blood sugar three times daily or as directed., Disp: 270 strip, Rfl: 1     cefuroxime (CEFTIN) 500 MG tablet, Take 500 mg by mouth 2 times daily, Disp: , Rfl:      diphenhydrAMINE-acetaminophen (TYLENOL PM EXTRA STRENGTH)  MG tablet, Take 1 tablet by mouth nightly as needed for sleep, Disp: , Rfl:      fexofenadine (ALLEGRA) 180 MG tablet, Take 180 mg by mouth every evening , Disp: 30 tablet, Rfl: 1     glipiZIDE (GLIPIZIDE XL) 10 MG 24 hr tablet, Take 2 in the am, Disp: 180 tablet, Rfl: 3     glucose blood VI test strips strip, Use as directed to check sugars twice a day, Disp: 3 Month, Rfl: 3     guaiFENesin (MUCINEX) 600 MG 12 hr tablet, Take 400-600 mg by mouth 2 times daily , Disp: , Rfl:      ipratropium - albuterol 0.5 mg/2.5 mg/3 mL (DUONEB) 0.5-2.5 (3) MG/3ML nebulization, Take 1 vial by nebulization every 6 hours as needed for shortness of breath / dyspnea or wheezing, Disp: , Rfl:      ketoconazole (NIZORAL) 2 % shampoo, Apply daily then rinse. Pt will call to order, Disp: 120 mL, Rfl: 11     losartan (COZAAR) 50 MG tablet, Take 1 tablet (50 mg) by mouth daily, Disp: 90 tablet, Rfl: 3     metoprolol (TOPROL-XL) 50 MG 24 hr tablet, 100 mg in am, 50 mg in pm, Disp: 270 tablet, Rfl: 3     nitroGLYcerin (NITROSTAT) 0.4 MG sublingual tablet, Place 1 tablet (0.4 mg) under the tongue every 5 minutes as needed for chest pain, Disp: 25 tablet, Rfl: 11     predniSONE (DELTASONE) 10 MG tablet, Take 10 mg by mouth daily, Disp: , Rfl:      pregabalin (LYRICA) 50 MG capsule, Take 2 in the am, 2 in the pm., Disp: 120 capsule, Rfl: 5     simvastatin (ZOCOR) 20 MG tablet, Take 1 tablet (20 mg) by mouth daily, Disp: 90 tablet, Rfl: 3     umeclidinium-vilanterol (ANORO ELLIPTA) 62.5-25 MCG/INH oral inhaler, Inhale 1 puff into the lungs daily, Disp: , Rfl:       "warfarin (COUMADIN) 2 MG tablet, Take 2 mg Mon & Fri; 4 mg rest of the week or as directed by Anticoagulation Clinic., Disp: 150 tablet, Rfl: 3     ascorbic acid (VITAMIN C) 1000 MG TABS, Take 1,000 mg by mouth daily In winter only, Disp: , Rfl:      metFORMIN (GLUCOPHAGE) 500 MG tablet, Take 1 tablet (500 mg) by mouth daily (with dinner) (Patient not taking: Reported on 5/3/2018), Disp: 90 tablet, Rfl: 3     PSYLLIUM HUSK, , Disp: , Rfl:     Patient Active Problem List   Diagnosis     Coronary atherosclerosis     Essential hypertension     Hemorrhage of rectum and anus     Allergic Rhinitis     AK (actinic keratosis)     HYPERLIPIDEMIA LDL GOAL <100     Ventral hernia     Colon polyp     Dysphonia     Senile nuclear sclerosis     Acute myocardial infarction of other specified sites, episode of care unspecified     Health Care Home     Atrial fibrillation (H)     Advance Care Planning     Edge's neuroma     COPD (chronic obstructive pulmonary disease) (H)     Long term current use of anticoagulant therapy     Hyperlipidemia with target LDL less than 100     Morbid obesity (H)     Type 2 diabetes mellitus with diabetic neuropathy (H)     Peripheral polyneuropathy     Chronic bronchitis, unspecified chronic bronchitis type (H)     DJD (degenerative joint disease), cervical     Benign neoplasm of skin of trunk, except scrotum     Decubitus ulcer of buttock, stage 1, unspecified laterality       Blood pressure 136/69, pulse 76, temperature 97.6  F (36.4  C), temperature source Tympanic, height 5' 10\" (1.778 m), weight 245 lb (111.1 kg), SpO2 94 %.    Exam:  GENERAL APPEARANCE: healthy, alert and no distress  EYES: EOMI,  PERRL  NECK: no adenopathy, no asymmetry, masses, or scars and thyroid normal to palpation  CV: regular rates and rhythm  PSYCH: mentation appears normal and affect normal/bright    reduced DP and PT pulses, no trophic changes or ulcerative lesions and normal sensory exam;   Except for one 3-4 mm dry " crack on the lateral left heel.         (E11.40) Type 2 diabetes mellitus with diabetic neuropathy, without long-term current use of insulin (H)  Comment:   Plan: acarbose (PRECOSE) 25 MG tablet, **A1C FUTURE         anytime        For the sugars the A1c is 8.1%. Use the lower carb diet and stay on the Glipizide at 10 mg pills, 2 daily. This is the max dose.   You are off Metformin for side effects. Exercise daily and use weight control.   Add Acarbose at 25 mg three times daily, right with the first bite of the meal. Monitor the sugars and call if it seems they are not near the goal of under 145 about 60-90 minutes after starting the meal.   The A1c will be rechecked in three months. The goal is to be under 7.0%.     (E78.5) Hyperlipidemia with target LDL less than 100  Comment:   Plan: simvastatin (ZOCOR) 20 MG tablet        Use the lower chol diet and exercise daily. Stay on the med and the LDL goal is under 100.     (I10) Essential hypertension with goal blood pressure less than 130/80  Comment:   Plan: metoprolol succinate (TOPROL-XL) 50 MG 24 hr         tablet, losartan (COZAAR) 50 MG tablet        Stay on the lower sodium diet . Monitor and record the BP as discussed. The goal is under 130/80. Use the non drug therapies.     (L98.9) Skin lesion   Comment:   Plan: For the left foot crack in the skin there is no infection but use the pressure reducing instruction as discussed. Monitor for complete healing.       Marcelino Foster

## 2018-05-03 NOTE — MR AVS SNAPSHOT
After Visit Summary   5/3/2018    Stef Bhatt    MRN: [id number]           Patient Information     Date Of Birth          1945        Visit Information        Provider Department      5/3/2018 1:00 PM Marcelino Foster MD [facility]        Today's Diagnoses     Skin lesion    -  1    Type 2 diabetes mellitus with diabetic neuropathy, without long-term current use of insulin (H)        Type 2 diabetes mellitus without complication, without long-term current use of insulin (H)        Hyperlipidemia with target LDL less than 100        Essential hypertension with goal blood pressure less than 130/80          Care Instructions          Thank you for choosing [facility].  You may be receiving a survey in the mail from [employer] regarding your visit today.  Please take a few minutes to complete and return the survey to let us know how we are doing.      If you have questions or concerns, please contact us via [email or web address] or you can contact your care team at [phone].    Our Clinic hours are:  Monday 6:40 am  to 7:00 pm  Tuesday -Friday 6:40 am to 5:00 pm    The [place] outpatient lab hours are:  Monday - Friday 6:10 am to 4:45 pm  Saturdays 7:00 am to 11:00 am  Appointments are required, call [phone]    If you have clinical questions after hours or would like to schedule an appointment,  call the clinic at [phone].      (E11.40) Type 2 diabetes mellitus with diabetic neuropathy, without long-term current use of insulin (H)  Comment:   Plan: acarbose (PRECOSE) 25 MG tablet, **A1C FUTURE         anytime        For the sugars the A1c is 8.1%. Use the lower carb diet and stay on the Glipizide at 10 mg pills, 2 daily. This is the max dose.   You are off Metformin for side effects. Exercise daily and use weight control.   Add Acarbose at 25 mg three times daily, right with the first bite of the meal. Monitor the sugars and call if it seems they are not near the goal of  under 145 about 60-90 minutes after starting the meal.   The A1c will be rechecked in three months. The goal is to be under 7.0%.     (E78.5) Hyperlipidemia with target LDL less than 100  Comment:   Plan: simvastatin (ZOCOR) 20 MG tablet        Use the lower chol diet and exercise daily. Stay on the med and the LDL goal is under 100.     (I10) Essential hypertension with goal blood pressure less than 130/80  Comment:   Plan: metoprolol succinate (TOPROL-XL) 50 MG 24 hr         tablet, losartan (COZAAR) 50 MG tablet        Stay on the lower sodium diet . Monitor and record the BP as discussed. The goal is under 130/80. Use the non drug therapies.     (L98.9) Skin lesion   Comment:   Plan: For the left foot crack in the skin there is no infection but use the pressure reducing instruction as discussed. Monitor for complete healing.           Follow-ups after your visit        Your next 10 appointments already scheduled     Jun 21, 2018  1:15 PM CDT   [place] Visit with [place] [facility]   [facility]    [address] [id number] [phone]              Future tests that were ordered for you today     Open Future Orders        Priority Expected Expires Ordered    **A1C FUTURE anytime Routine 5/3/2018 5/3/2019 5/3/2018            Who to contact     If you have questions or need follow up information about today's clinic visit or your schedule please contact [facility] directly at [phone].  Normal or non-critical lab and imaging results will be communicated to you by MyChart, letter or phone within 4 business days after the clinic has received the results. If you do not hear from us within 7 days, please contact the clinic through [employer] or phone. If you have a critical or abnormal lab result, we will notify you by phone as soon as possible.  Submit refill requests through [employer] or call your pharmacy and they will forward the refill  "request to us. Please allow 3 business days for your refill to be completed.          Additional Information About Your Visit        [employer] Information     [employer] lets you send messages to your doctor, view your test results, renew your prescriptions, schedule appointments and more. To sign up, go to [email or web address] . Click on \"Log in\" on the left side of the screen, which will take you to the Welcome page. Then click on \"Sign up Now\" on the right side of the page.     You will be asked to enter the access code listed below, as well as some personal information. Please follow the directions to create your username and password.     Your access code is: [email or web address]  Expires: 2018 11:03 AM     Your access code will  in 90 days. If you need help or a new code, please call your [place] clinic or [phone].        Care EveryWhere ID     This is your Care EveryWhere ID. This could be used by other organizations to access your [place] medical records  [id number]        Your Vitals Were     Pulse Temperature Height Pulse Oximetry BMI (Body Mass Index)       76 97.6  F (36.4  C) (Tympanic) 5' 10\" (1.778 m) 94% 35.15 kg/m2        Blood Pressure from Last 3 Encounters:   18 136/69   18 113/79   17 125/83    Weight from Last 3 Encounters:   18 245 lb (111.1 kg)   18 248 lb (112.5 kg)   17 251 lb (113.9 kg)                 Today's Medication Changes          These changes are accurate as of 5/3/18  1:55 PM.  If you have any questions, ask your nurse or doctor.               Start taking these medicines.        Dose/Directions    acarbose 25 MG tablet   Commonly known as:  PRECOSE   Used for:  Type 2 diabetes mellitus with diabetic neuropathy, without long-term current use of insulin (H)   Started by:  Marcelino Foster MD        Dose:  25 mg   Take 1 tablet (25 mg) by mouth 3 times daily (with meals)   Quantity:  90 tablet   Refills:  11         Stop taking " these medicines if you haven't already. Please contact your care team if you have questions.     metFORMIN 500 MG tablet   Commonly known as:  GLUCOPHAGE   Stopped by:  Marcelino Foster MD                Where to get your medicines      These medications were sent to [facility] [address] [facility]. [place]  [id number] [place] [address]. [facility] [address]     Phone:  [phone]     acarbose 25 MG tablet    glipiZIDE 10 MG 24 hr tablet    losartan 50 MG tablet    metoprolol succinate 50 MG 24 hr tablet    simvastatin 20 MG tablet                Primary Care Provider Office Phone # Fax #    Marcelino Foster [phone] [address] [facility] [address]        Equal Access to Services     BARBI ROMERO : Hadii janna ambriz hadasho Soomaali, waaxda luqadaha, qaybta kaalmada adeegyada, waxay roseyin hayashleighn edgardo brown . So [facility] [phone].    ATENCIÓN: Si habla español, tiene a chau disposición servicios gratuitos de asistencia lingüística. [facility] [phone].    We comply with applicable federal civil rights laws and [place] laws. We do not discriminate on the basis of race, color, national origin, age, disability, sex, sexual orientation, or gender identity.            Thank you!     Thank you for choosing [facility]  for your care. Our goal is always to provide you with excellent care. Hearing back from our patients is one way we can continue to improve our services. Please take a few minutes to complete the written survey that you may receive in the mail after your visit with us. Thank you!             Your Updated Medication List - Protect others around you: Learn how to safely use, store and throw away your medicines at www.disposemymeds.org.          This list is accurate as of 5/3/18  1:55 PM.  Always use your most recent med list.                   Brand Name Dispense Instructions for use Diagnosis    acarbose 25 MG tablet    PRECOSE    90 tablet     Take 1 tablet (25 mg) by mouth 3 times daily (with meals)    Type 2 diabetes mellitus with diabetic neuropathy, without long-term current use of insulin (H)       albuterol 108 (90 Base) MCG/ACT Inhaler    VENTOLIN HFA    1 Inhaler    Inhale 2 puffs into the lungs every 4 hours as needed Pt will call to order    COPD (chronic obstructive pulmonary disease) (H)       ALLEGRA 180 MG tablet   Generic drug:  fexofenadine     30 tablet    Take 180 mg by mouth every evening        ascorbic acid 1000 MG Tabs    vitamin C     Take 1,000 mg by mouth daily In winter only        aspirin 81 MG tablet     1 tablet    Take 81 mg by mouth every evening.        blood glucose monitoring lancets     100 Each    use as directed to test blood sugars up to once daily; pt will call to order    Type II or unspecified type diabetes mellitus without mention of complication, not stated as uncontrolled       * blood glucose monitoring test strip    no brand specified    3 Month    Use as directed to check sugars twice a day    Type II or unspecified type diabetes mellitus without mention of complication, not stated as uncontrolled       * blood glucose monitoring test strip    ACCU-CHEK COMPACT DRUM    270 strip    Use to test blood sugar three times daily or as directed.    Obesity, unspecified, Type 2 diabetes, HbA1C goal < 8% (H)       cefuroxime 500 MG tablet    CEFTIN     Take 500 mg by mouth 2 times daily        glipiZIDE 10 MG 24 hr tablet    glipiZIDE XL    180 tablet    Take 2 in the am    Type 2 diabetes mellitus without complication, without long-term current use of insulin (H)       ipratropium - albuterol 0.5 mg/2.5 mg/3 mL 0.5-2.5 (3) MG/3ML neb solution    DUONEB     Take 1 vial by nebulization every 6 hours as needed for shortness of breath / dyspnea or wheezing        ketoconazole 2 % shampoo    NIZORAL    120 mL    Apply daily then rinse. Pt will call to order    Seborrheic dermatitis       losartan 50 MG tablet    COZAAR     90 tablet    Take 1 tablet (50 mg) by mouth daily    Essential hypertension with goal blood pressure less than 130/80       metoprolol succinate 50 MG 24 hr tablet    TOPROL-XL    270 tablet    100 mg in am, 50 mg in pm    Essential hypertension with goal blood pressure less than 130/80       MUCINEX 600 MG 12 hr tablet   Generic drug:  guaiFENesin      Take 400-600 mg by mouth 2 times daily        nitroGLYcerin 0.4 MG sublingual tablet    NITROSTAT    25 tablet    Place 1 tablet (0.4 mg) under the tongue every 5 minutes as needed for chest pain    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    Peripheral polyneuropathy       PSYLLIUM HUSK           simvastatin 20 MG tablet    ZOCOR    90 tablet    Take 1 tablet (20 mg) by mouth daily    Hyperlipidemia with target LDL less than 100       TYLENOL PM EXTRA STRENGTH  MG tablet   Generic drug:  diphenhydrAMINE-acetaminophen      Take 1 tablet by mouth nightly as needed for sleep        umeclidinium-vilanterol 62.5-25 MCG/INH oral inhaler    ANORO ELLIPTA     Inhale 1 puff into the lungs daily        warfarin 2 MG tablet    COUMADIN    150 tablet    Take 2 mg Mon & Fri; 4 mg rest of the week or as directed by Anticoagulation Clinic.    Chronic atrial fibrillation (H), Long term current use of anticoagulant therapy       * Notice:  This list has 2 medication(s) that are the same as other medications prescribed for you. Read the directions carefully, and ask your doctor or other care provider to review them with you.

## 2018-05-03 NOTE — NURSING NOTE
"Chief Complaint   Patient presents with     Diabetes     Recheck on diabetes.       Lab Result Notice     Follow up on his A1C and thyroid levels.       Initial /69  Pulse 76  Temp 97.6  F (36.4  C) (Tympanic)  Ht 5' 10\" (1.778 m)  Wt 245 lb (111.1 kg)  SpO2 94%  BMI 35.15 kg/m2 Estimated body mass index is 35.15 kg/(m^2) as calculated from the following:    Height as of this encounter: 5' 10\" (1.778 m).    Weight as of this encounter: 245 lb (111.1 kg).  Medication Reconciliation: complete  "

## 2018-05-03 NOTE — PATIENT INSTRUCTIONS
Thank you for choosing [facility].  You may be receiving a survey in the mail from Ariana Mendiola regarding your visit today.  Please take a few minutes to complete and return the survey to let us know how we are doing.      If you have questions or concerns, please contact us via [email or web address] or you can contact your care team at [phone].    Our Clinic hours are:  Monday 6:40 am  to 7:00 pm  Tuesday -Friday 6:40 am to 5:00 pm    The [place] outpatient lab hours are:  Monday - Friday 6:10 am to 4:45 pm  Saturdays 7:00 am to 11:00 am  Appointments are required, call [phone]    If you have clinical questions after hours or would like to schedule an appointment,  call the clinic at [phone].      (E11.40) Type 2 diabetes mellitus with diabetic neuropathy, without long-term current use of insulin (H)  Comment:   Plan: acarbose (PRECOSE) 25 MG tablet, **A1C FUTURE         anytime        For the sugars the A1c is 8.1%. Use the lower carb diet and stay on the Glipizide at 10 mg pills, 2 daily. This is the max dose.   You are off Metformin for side effects. Exercise daily and use weight control.   Add Acarbose at 25 mg three times daily, right with the first bite of the meal. Monitor the sugars and call if it seems they are not near the goal of under 145 about 60-90 minutes after starting the meal.   The A1c will be rechecked in three months. The goal is to be under 7.0%.     (E78.5) Hyperlipidemia with target LDL less than 100  Comment:   Plan: simvastatin (ZOCOR) 20 MG tablet        Use the lower chol diet and exercise daily. Stay on the med and the LDL goal is under 100.     (I10) Essential hypertension with goal blood pressure less than 130/80  Comment:   Plan: metoprolol succinate (TOPROL-XL) 50 MG 24 hr         tablet, losartan (COZAAR) 50 MG tablet        Stay on the lower sodium diet . Monitor and record the BP as discussed. The goal is under 130/80. Use the non drug therapies.     (L98.9) Skin  lesion   Comment:   Plan: For the left foot crack in the skin there is no infection but use the pressure reducing instruction as discussed. Monitor for complete healing.

## 2018-06-21 ENCOUNTER — ANTICOAGULATION THERAPY VISIT (OUTPATIENT)
Dept: ANTICOAGULATION | Facility: CLINIC | Age: 73
End: 2018-06-21
Payer: MEDICARE

## 2018-06-21 DIAGNOSIS — Z79.01 LONG TERM CURRENT USE OF ANTICOAGULANT THERAPY: ICD-10-CM

## 2018-06-21 DIAGNOSIS — I48.91 ATRIAL FIBRILLATION (H): ICD-10-CM

## 2018-06-21 LAB — INR POINT OF CARE: 3.2 (ref 0.86–1.14)

## 2018-06-21 PROCEDURE — 85610 PROTHROMBIN TIME: CPT | Mod: QW

## 2018-06-21 PROCEDURE — 99207 ZZC NO CHARGE NURSE ONLY: CPT

## 2018-06-21 PROCEDURE — 36416 COLLJ CAPILLARY BLOOD SPEC: CPT

## 2018-06-21 NOTE — PROGRESS NOTES
ANTICOAGULATION FOLLOW-UP CLINIC VISIT    Patient Name:  Stef Bhatt  Date:  6/21/2018  Contact Type:  Face to Face    SUBJECTIVE:     Patient Findings     Positives Change in medications (d/c metformin, started acarbose TID; per [employer], taken concurrently with warfarin can increase risk of bleeding), Change in diet/appetite (large amount of alcohol on Monday, get together with group of family and friends, tried to compensate the next couple days with large amounts of dark greens)    Comments Pt denies changes in medications, activity or health, reports taking warfarin as directed, denies s/s of bleeding. Writer educated pt regarding s/s of bleeding to watch for and appropriate actions to take.    Pt has been slightly supratherapeutic for past 2 INR checks, both times pt has consumed larger amount of alcohol just prior to INR checks.  Pt reports he has really been limiting his alcohol consumption recently to 1-2 drinks per week (other than this past Monday).  Writer instructed pt to resume his normal consumption of alcohol and normal diet, will recheck INR in 2 weeks of continuing on current maintenance dose of warfarin, and if he is still supratherapeutic, he will need to have his maintenance dose decreased.  Pt does not want to decrease dose, but is in agreement with plan.             OBJECTIVE    INR Protime   Date Value Ref Range Status   06/21/2018 3.2 (A) 0.86 - 1.14 Final       ASSESSMENT / PLAN  INR assessment SUPRA    Recheck INR In: 2 WEEKS    INR Location Clinic      Anticoagulation Summary as of 6/21/2018     INR goal 2.0-3.0   Today's INR 3.2!   Warfarin maintenance plan 2 mg (2 mg x 1) on Mon, Fri; 4 mg (2 mg x 2) all other days   Full warfarin instructions 2 mg on Mon, Fri; 4 mg all other days   Weekly warfarin total 24 mg   No change documented Vandana Jaramillo, RN   Plan last modified Noelle Harrington RN (4/6/2015)   Next INR check 7/5/2018   Priority INR   Target end date Indefinite     Indications   Atrial fibrillation (H) [I48.91]  Long term current use of anticoagulant therapy [Z79.01]         Anticoagulation Episode Summary     INR check location     Preferred lab     Send INR reminders to [facility] POOL    Comments * warfarin 2 mg tabs. Dr Shelley Lennox is pulmonologist. Likes 9 week rechecks      Anticoagulation Care Providers     Provider Role Specialty Phone number    Marcelino Foster MD [facility] [phone]            See the Encounter Report to view Anticoagulation Flowsheet and Dosing Calendar (Go to Encounters tab in chart review, and find the Anticoagulation Therapy Visit)        Vandana Jaramillo RN

## 2018-06-21 NOTE — MR AVS SNAPSHOT
Stef Bhatt   6/21/2018 1:15 PM   Anticoagulation Therapy Visit    Description:  72 year old male   Provider:  [place] [facility]   Department:  [place] [facility]           INR as of 6/21/2018     Today's INR 3.2!      Anticoagulation Summary as of 6/21/2018     INR goal 2.0-3.0   Today's INR 3.2!   Full warfarin instructions 2 mg on Mon, Fri; 4 mg all other days   Next INR check 7/5/2018    Indications   Atrial fibrillation (H) [I48.91]  Long term current use of anticoagulant therapy [Z79.01]         Your next Anticoagulation Clinic appointment(s)     Jul 05, 2018  1:15 PM CDT   [facility] [facility] with [place] [facility]   [facility]    [address] [facility] [address] [phone]              Contact Numbers     Please call [phone] with any problems or questions regarding your therapy.    If you need to cancel and/or reschedule your appointment please call one of the following numbers:  [facility] [phone]  [place] - [phone]  [facility] [phone]  [place] - [phone]  [place] - [phone]            June 2018 Details    Sun Mon Tue Wed Thu Fri Sat          1               2                 3               4               5               6               7               8               9                 10               11               12               13               14               15               16                 17               18               19               20               21      4 mg   See details      22      2 mg         23      4 mg           24      4 mg         25      2 mg         26      4 mg         27      4 mg         28      4 mg         29      2 mg         30      4 mg          Date Details   06/21 This INR check               How to take your warfarin dose     To take:  2 mg Take 1 of the 2 mg tablets.    To take:  4 mg Take 2 of the 2 mg tablets.           July 2018 Details    Sun Mon Tue Wed Thu Fri  Sat     1      4 mg         2      2 mg         3      4 mg         4      4 mg         5            6               7                 8               9               10               11               12               13               14                 15               16               17               18               19               20               21                 22               23               24               25               26               27               28                 29               30               31                    Date Details   No additional details    Date of next INR:  7/5/2018         How to take your warfarin dose     To take:  2 mg Take 1 of the 2 mg tablets.    To take:  4 mg Take 2 of the 2 mg tablets.

## 2018-07-05 ENCOUNTER — ANTICOAGULATION THERAPY VISIT (OUTPATIENT)
Dept: ANTICOAGULATION | Facility: CLINIC | Age: 73
End: 2018-07-05
Payer: MEDICARE

## 2018-07-05 DIAGNOSIS — I48.91 ATRIAL FIBRILLATION (H): ICD-10-CM

## 2018-07-05 DIAGNOSIS — Z79.01 LONG TERM CURRENT USE OF ANTICOAGULANT THERAPY: ICD-10-CM

## 2018-07-05 LAB — INR POINT OF CARE: 2.3 (ref 0.86–1.14)

## 2018-07-05 PROCEDURE — 99207 ZZC NO CHARGE NURSE ONLY: CPT

## 2018-07-05 PROCEDURE — 85610 PROTHROMBIN TIME: CPT | Mod: QW

## 2018-07-05 PROCEDURE — 36416 COLLJ CAPILLARY BLOOD SPEC: CPT

## 2018-07-05 NOTE — MR AVS SNAPSHOT
Stef Bhatt   7/5/2018 1:15 PM   Anticoagulation Therapy Visit    Description:  72 year old male   Provider:  [place] [facility]   Department:  [place] [facility]           INR as of 7/5/2018     Today's INR 2.3      Anticoagulation Summary as of 7/5/2018     INR goal 2.0-3.0   Today's INR 2.3   Full warfarin instructions 2 mg on Mon, Fri; 4 mg all other days   Next INR check 7/26/2018    Indications   Atrial fibrillation (H) [I48.91]  Long term current use of anticoagulant therapy [Z79.01]         Your next Anticoagulation Clinic appointment(s)     Aug 02, 2018  1:00 PM CDT   [facility] with [place] [facility]    [address] [address] [phone]              Contact Numbers     Please call [phone] with any problems or questions regarding your therapy.    If you need to cancel and/or reschedule your appointment please call one of the following numbers:  [facility] [phone]  [place] - [phone]  [place] - [phone]  [facility] [phone]  [place] - [phone]            July 2018 Details    Sun Mon Tue Wed Thu Fri Sat     1               2               3               4               5      4 mg   See details      6      2 mg         7      4 mg           8      4 mg         9      2 mg         10      4 mg         11      4 mg         12      4 mg         13      2 mg         14      4 mg           15      4 mg         16      2 mg         17      4 mg         18      4 mg         19      4 mg         20      2 mg         21      4 mg           22      4 mg         23      2 mg         24      4 mg         25      4 mg         26            27               28                 29               30               31                    Date Details   07/05 This INR check       Date of next INR:  7/26/2018         How to take your warfarin dose     To take:  2 mg Take 1 of the 2 mg tablets.    To take:  4 mg  Take 2 of the 2 mg tablets.

## 2018-07-05 NOTE — PROGRESS NOTES
ANTICOAGULATION FOLLOW-UP CLINIC VISIT    Patient Name:  Stef Bhatt  Date:  7/5/2018  Contact Type:  Face to Face    SUBJECTIVE:     Patient Findings     Positives Missed doses    Comments Patient missed one dose as his wife was in the hospital. INR remains therapeutic so I will continue patient on the maintenance dose. Recheck INR in 4 weeks.            OBJECTIVE    INR Protime   Date Value Ref Range Status   07/05/2018 2.3 (A) 0.86 - 1.14 Final       ASSESSMENT / PLAN  INR assessment THER    Recheck INR In: 4 WEEKS    INR Location Clinic      Anticoagulation Summary as of 7/5/2018     INR goal 2.0-3.0   Today's INR 2.3   Warfarin maintenance plan 2 mg (2 mg x 1) on Mon, Fri; 4 mg (2 mg x 2) all other days   Full warfarin instructions 2 mg on Mon, Fri; 4 mg all other days   Weekly warfarin total 24 mg   Plan last modified Noelle Harrington RN (4/6/2015)   Next INR check 7/26/2018   Priority INR   Target end date Indefinite    Indications   Atrial fibrillation (H) [I48.91]  Long term current use of anticoagulant therapy [Z79.01]         Anticoagulation Episode Summary     INR check location     Preferred lab     Send INR reminders to [facility]    Comments * warfarin 2 mg tabs. Dr Shelley Lennox is pulmonologist. Likes 9 week rechecks      Anticoagulation Care Providers     Provider Role Specialty Phone number    Marcelino Foster MD [facility] [phone]            See the Encounter Report to view Anticoagulation Flowsheet and Dosing Calendar (Go to Encounters tab in chart review, and find the Anticoagulation Therapy Visit)        Bindu Callejas, RN

## 2018-08-02 ENCOUNTER — ANTICOAGULATION THERAPY VISIT (OUTPATIENT)
Dept: ANTICOAGULATION | Facility: CLINIC | Age: 73
End: 2018-08-02
Payer: MEDICARE

## 2018-08-02 DIAGNOSIS — I48.91 ATRIAL FIBRILLATION (H): ICD-10-CM

## 2018-08-02 DIAGNOSIS — Z79.01 LONG TERM CURRENT USE OF ANTICOAGULANT THERAPY: ICD-10-CM

## 2018-08-02 LAB — INR POINT OF CARE: 3.3 (ref 0.86–1.14)

## 2018-08-02 PROCEDURE — 85610 PROTHROMBIN TIME: CPT | Mod: QW

## 2018-08-02 PROCEDURE — 36416 COLLJ CAPILLARY BLOOD SPEC: CPT

## 2018-08-02 PROCEDURE — 99207 ZZC NO CHARGE NURSE ONLY: CPT

## 2018-08-02 NOTE — MR AVS SNAPSHOT
Stef Bhatt   8/2/2018 1:00 PM   Anticoagulation Therapy Visit    Description:  73 year old male   Provider:  [place] [facility]   Department:  [facility]           INR as of 8/2/2018     Today's INR 3.3!      Anticoagulation Summary as of 8/2/2018     INR goal 2.0-3.0   Today's INR 3.3!   Full warfarin instructions 2 mg on Mon, Fri; 4 mg all other days   Next INR check 8/16/2018    Indications   Atrial fibrillation (H) [I48.91]  Long term current use of anticoagulant therapy [Z79.01]         Your next Anticoagulation Clinic appointment(s)     Aug 16, 2018  1:00 PM CDT   Anticoagulation Visit with [place] [facility]   [facility]    [address] [facility] [address] [phone]              Contact Numbers     Please call [phone] with any problems or questions regarding your therapy.    If you need to cancel and/or reschedule your appointment please call one of the following numbers:  [facility] [phone]  [place] - [phone]  [facility] [phone]  [facility] [phone]  [place] - [phone]            August 2018 Details    Sun Mon Tue Wed Thu Fri Sat        1               2      4 mg   See details      3      2 mg         4      4 mg           5      4 mg         6      2 mg         7      4 mg         8      4 mg         9      4 mg         10      2 mg         11      4 mg           12      4 mg         13      2 mg         14      4 mg         15      4 mg         16            17               18                 19               20               21               22               23               24               25                 26               27               28               29               30               31                 Date Details   08/02 This INR check       Date of next INR:  8/16/2018         How to take your warfarin dose     To take:  2 mg Take 1 of the 2 mg tablets.    To take:  4 mg Take 2 of the 2 mg tablets.

## 2018-08-02 NOTE — PROGRESS NOTES
ANTICOAGULATION FOLLOW-UP CLINIC VISIT    Patient Name:  Stef Bhatt  Date:  8/2/2018  Contact Type:  Face to Face    SUBJECTIVE:     Patient Findings     Positives Inflammation (Diarrhea)    Comments Patient takes cefuroxime and prednisone when he has a COPD exacerbation. He will start to feel congested. Patient has had this going on, however he did not start the medications -- maybe will start at the end of this week?     Patient states he has been having diarrhea, writer explained this is likely the reason why his INR is elevated. He already took his warfarin dose today. No issues with bleeding or unusual bruising noted. Will plan to recheck the INR in 2 weeks.            OBJECTIVE    INR Protime   Date Value Ref Range Status   08/02/2018 3.3 (A) 0.86 - 1.14 Final       ASSESSMENT / PLAN  No question data found.  Anticoagulation Summary as of 8/2/2018     INR goal 2.0-3.0   Today's INR 3.3!   Warfarin maintenance plan 2 mg (2 mg x 1) on Mon, Fri; 4 mg (2 mg x 2) all other days   Full warfarin instructions 2 mg on Mon, Fri; 4 mg all other days   Weekly warfarin total 24 mg   No change documented Noelle Harrington RN   Plan last modified Noelle Harrington RN (4/6/2015)   Next INR check 8/16/2018   Priority INR   Target end date Indefinite    Indications   Atrial fibrillation (H) [I48.91]  Long term current use of anticoagulant therapy [Z79.01]         Anticoagulation Episode Summary     INR check location     Preferred lab     Send INR reminders to [facility]    Comments * warfarin 2 mg tabs. Dr Shelley Lennox is pulmonologist. Likes 9 week rechecks      Anticoagulation Care Providers     Provider Role Specialty Phone number    Marcelino Foster MD [facility] [phone]            See the Encounter Report to view Anticoagulation Flowsheet and Dosing Calendar (Go to Encounters tab in chart review, and find the Anticoagulation Therapy Visit)        Noelle LOPEZ  RODNEY Harrington, CACP

## 2018-08-16 ENCOUNTER — ANTICOAGULATION THERAPY VISIT (OUTPATIENT)
Dept: ANTICOAGULATION | Facility: CLINIC | Age: 73
End: 2018-08-16
Payer: MEDICARE

## 2018-08-16 DIAGNOSIS — I48.91 ATRIAL FIBRILLATION (H): ICD-10-CM

## 2018-08-16 DIAGNOSIS — Z79.01 LONG TERM CURRENT USE OF ANTICOAGULANT THERAPY: ICD-10-CM

## 2018-08-16 LAB — INR POINT OF CARE: 2.6 (ref 0.86–1.14)

## 2018-08-16 PROCEDURE — 99207 ZZC NO CHARGE NURSE ONLY: CPT

## 2018-08-16 PROCEDURE — 36416 COLLJ CAPILLARY BLOOD SPEC: CPT

## 2018-08-16 PROCEDURE — 85610 PROTHROMBIN TIME: CPT | Mod: QW

## 2018-08-16 NOTE — MR AVS SNAPSHOT
Stef Bhatt   8/16/2018 1:00 PM   Anticoagulation Therapy Visit    Description:  73 year old male   Provider:  [place] [facility]   Department:  [facility]           INR as of 8/16/2018     Today's INR 2.6      Anticoagulation Summary as of 8/16/2018     INR goal 2.0-3.0   Today's INR 2.6   Full warfarin instructions 2 mg on Mon, Fri; 4 mg all other days   Next INR check 10/18/2018    Indications   Atrial fibrillation (H) [I48.91]  Long term current use of anticoagulant therapy [Z79.01]         Your next Anticoagulation Clinic appointment(s)     Oct 18, 2018  1:00 PM CDT   Anticoagulation Visit with [place] [facility]   [facility] ([facility]    [address] [address] [phone]              Contact Numbers     Please call [phone] with any problems or questions regarding your therapy.    If you need to cancel and/or reschedule your appointment please call one of the following numbers:  [facility] [phone]  [place] - [phone]  [facility] [phone]  [facility] [phone]  [place] - [phone]            August 2018 Details    Sun Mon Tue Wed Thu Fri Sat        1               2               3               4                 5               6               7               8               9               10               11                 12               13               14               15               16      4 mg   See details      17      2 mg         18      4 mg           19      4 mg         20      2 mg         21      4 mg         22      4 mg         23      4 mg         24      2 mg         25      4 mg           26      4 mg         27      2 mg         28      4 mg         29      4 mg         30      4 mg         31      2 mg           Date Details   08/16 This INR check               How to take your warfarin dose     To take:  2 mg Take 1 of the 2 mg tablets.    To take:  4 mg Take 2 of the 2 mg tablets.            September 2018 Details    Sun Mon Tue Wed Thu Fri Sat           1      4 mg           2      4 mg         3      2 mg         4      4 mg         5      4 mg         6      4 mg         7      2 mg         8      4 mg           9      4 mg         10      2 mg         11      4 mg         12      4 mg         13      4 mg         14      2 mg         15      4 mg           16      4 mg         17      2 mg         18      4 mg         19      4 mg         20      4 mg         21      2 mg         22      4 mg           23      4 mg         24      2 mg         25      4 mg         26      4 mg         27      4 mg         28      2 mg         29      4 mg           30      4 mg                Date Details   No additional details            How to take your warfarin dose     To take:  2 mg Take 1 of the 2 mg tablets.    To take:  4 mg Take 2 of the 2 mg tablets.           October 2018 Details    Sun Mon Tue Wed Thu Fri Sat      1      2 mg         2      4 mg         3      4 mg         4      4 mg         5      2 mg         6      4 mg           7      4 mg         8      2 mg         9      4 mg         10      4 mg         11      4 mg         12      2 mg         13      4 mg           14      4 mg         15      2 mg         16      4 mg         17      4 mg         18            19               20                 21               22               23               24               25               26               27                 28               29               30               31                   Date Details   No additional details    Date of next INR:  10/18/2018         How to take your warfarin dose     To take:  2 mg Take 1 of the 2 mg tablets.    To take:  4 mg Take 2 of the 2 mg tablets.

## 2018-08-16 NOTE — PROGRESS NOTES
ANTICOAGULATION FOLLOW-UP CLINIC VISIT    Patient Name:  Stef Bhatt  Date:  8/16/2018  Contact Type:  Face to Face    SUBJECTIVE:     Patient Findings     Positives No Problem Findings    Comments Patient denies any changes. Patient will continue weekly maintenance dose. INR is therapeutic.              OBJECTIVE    INR Protime   Date Value Ref Range Status   08/16/2018 2.6 (A) 0.86 - 1.14 Final       ASSESSMENT / PLAN  INR assessment THER    Recheck INR In: 9 WEEKS    INR Location Clinic      Anticoagulation Summary as of 8/16/2018     INR goal 2.0-3.0   Today's INR 2.6   Warfarin maintenance plan 2 mg (2 mg x 1) on Mon, Fri; 4 mg (2 mg x 2) all other days   Full warfarin instructions 2 mg on Mon, Fri; 4 mg all other days   Weekly warfarin total 24 mg   No change documented Bindu Callejas RN   Plan last modified Noelle Harrington RN (4/6/2015)   Next INR check 10/18/2018   Priority INR   Target end date Indefinite    Indications   Atrial fibrillation (H) [I48.91]  Long term current use of anticoagulant therapy [Z79.01]         Anticoagulation Episode Summary     INR check location     Preferred lab     Send INR reminders to [facility]    Comments * warfarin 2 mg tabs. Dr Shelley Lennox is pulmonologist. Likes 9 week rechecks      Anticoagulation Care Providers     Provider Role Specialty Phone number    Marcelino Foster MD [facility] [phone]            See the Encounter Report to view Anticoagulation Flowsheet and Dosing Calendar (Go to Encounters tab in chart review, and find the Anticoagulation Therapy Visit)        Bindu Callejas, RN

## 2018-08-22 DIAGNOSIS — E11.40 TYPE 2 DIABETES MELLITUS WITH DIABETIC NEUROPATHY, WITHOUT LONG-TERM CURRENT USE OF INSULIN (H): ICD-10-CM

## 2018-08-22 LAB — HBA1C MFR BLD: 6.7 % (ref 0–5.6)

## 2018-08-22 PROCEDURE — 83036 HEMOGLOBIN GLYCOSYLATED A1C: CPT | Performed by: FAMILY MEDICINE

## 2018-08-22 PROCEDURE — 36415 COLL VENOUS BLD VENIPUNCTURE: CPT | Performed by: FAMILY MEDICINE

## 2018-08-29 ENCOUNTER — OFFICE VISIT (OUTPATIENT)
Dept: FAMILY MEDICINE | Facility: CLINIC | Age: 73
End: 2018-08-29
Payer: MEDICARE

## 2018-08-29 VITALS
TEMPERATURE: 97.9 F | BODY MASS INDEX: 34.65 KG/M2 | WEIGHT: 242 LBS | DIASTOLIC BLOOD PRESSURE: 65 MMHG | SYSTOLIC BLOOD PRESSURE: 109 MMHG | HEART RATE: 72 BPM | OXYGEN SATURATION: 94 % | HEIGHT: 70 IN

## 2018-08-29 DIAGNOSIS — J42 CHRONIC BRONCHITIS, UNSPECIFIED CHRONIC BRONCHITIS TYPE (H): Primary | ICD-10-CM

## 2018-08-29 DIAGNOSIS — I25.701: ICD-10-CM

## 2018-08-29 DIAGNOSIS — E11.40 TYPE 2 DIABETES MELLITUS WITH DIABETIC NEUROPATHY, WITHOUT LONG-TERM CURRENT USE OF INSULIN (H): ICD-10-CM

## 2018-08-29 DIAGNOSIS — G62.9 PERIPHERAL POLYNEUROPATHY: ICD-10-CM

## 2018-08-29 PROCEDURE — 99214 OFFICE O/P EST MOD 30 MIN: CPT | Performed by: FAMILY MEDICINE

## 2018-08-29 RX ORDER — CEFUROXIME AXETIL 500 MG/1
500 TABLET ORAL 2 TIMES DAILY
Qty: 20 TABLET | Refills: 5 | Status: SHIPPED | OUTPATIENT
Start: 2018-08-29 | End: 2018-09-08

## 2018-08-29 RX ORDER — NITROGLYCERIN 0.4 MG/1
0.4 TABLET SUBLINGUAL EVERY 5 MIN PRN
Qty: 25 TABLET | Refills: 11 | Status: SHIPPED | OUTPATIENT
Start: 2018-08-29

## 2018-08-29 RX ORDER — PREGABALIN 50 MG/1
CAPSULE ORAL
Qty: 120 CAPSULE | Refills: 5 | Status: SHIPPED | OUTPATIENT
Start: 2018-08-29 | End: 2019-02-27

## 2018-08-29 NOTE — MR AVS SNAPSHOT
After Visit Summary   8/29/2018    Stef Bhatt    MRN: [id number]           Patient Information     Date Of Birth          1945        Visit Information        Provider Department      8/29/2018 11:00 AM Marcelino Foster MD 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  You may be receiving a survey in the mail from [facility] regarding your visit today.  Please take a few minutes to complete and return the survey to let us know how we are doing.      If you have questions or concerns, please contact us via [employer] or you can contact your care team at [phone].    Our Clinic hours are:  Monday 6:40 am  to 7:00 pm  Tuesday -Friday 6:40 am to 5:00 pm    The [place] outpatient lab hours are:  Monday - Friday 6:10 am to 4:45 pm  Saturdays 7:00 am to 11:00 am  Appointments are required, call [phone]    If you have clinical questions after hours or would like to schedule an appointment,  call the clinic at [phone].      (J42) Chronic bronchitis, unspecified chronic bronchitis type (H)  (primary encounter diagnosis)  Comment:   Plan: cefuroxime (CEFTIN) 500 MG tablet        We discussed the use of the antibiotic and refills are given. Prevention discussed and he will get the flu shot in a few weeks.     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 Refills on that are done.     (G62.9) Peripheral  "polyneuropathy  Comment:   Plan: pregabalin (LYRICA) 50 MG capsule        Use this as discussed and the written refill is done today for six months. Recheck then.             Follow-ups after your visit        Your next 10 appointments already scheduled     Oct 18, 2018  1:00 PM CDT   Anticoagulation Visit with [place] [facility]   [facility]    [address]  [facility] [id number] [phone]              Who to contact     If you have questions or need follow up information about today's clinic visit or your schedule please contact [facility] directly at [phone].  Normal or non-critical lab and imaging results will be communicated to you by MyChart, letter or phone within 4 business days after the clinic has received the results. If you do not hear from us within 7 days, please contact the clinic through MyChart or phone. If you have a critical or abnormal lab result, we will notify you by phone as soon as possible.  Submit refill requests through [employer] or call your pharmacy and they will forward the refill request to us. Please allow 3 business days for your refill to be completed.          Additional Information About Your Visit        Care EveryWhere ID     This is your Care EveryWhere ID. This could be used by other organizations to access your [place] medical records  [id number]        Your Vitals Were     Pulse Temperature Height Pulse Oximetry BMI (Body Mass Index)       72 97.9  F (36.6  C) (Tympanic) 5' 10\" (1.778 m) 94% 34.72 kg/m2        Blood Pressure from Last 3 Encounters:   08/29/18 109/65   05/03/18 136/69   03/30/18 113/79    Weight from Last 3 Encounters:   08/29/18 242 lb (109.8 kg)   05/03/18 245 lb (111.1 kg)   03/30/18 248 lb (112.5 kg)              Today, you had the following     No orders found for display         Today's Medication Changes          These changes are accurate as of 8/29/18 11:42 AM.  If you have any " questions, ask your nurse or doctor.               Stop taking these medicines if you haven't already. Please contact your care team if you have questions.     PSYLLIUM HUSK   Stopped by:  Marcelino Foster MD                Where to get your medicines      These [place] [facility] [place] [address]  [address] [address] [facility] [address]     Phone:  [phone]     cefuroxime 500 MG tablet    nitroGLYcerin 0.4 MG sublingual tablet         Some of these will need a paper prescription and others can be bought over the counter.  Ask your nurse if you have questions.     Bring a paper prescription for each of these medications     pregabalin 50 MG capsule                Primary Care Provider Office Phone # Fax #    Marcelino Foster [phone] [address] [address]        Equal Access to Services     ANTONY [facility] : Hadii janna ambriz hadasho Soomaali, waaxda luqadaha, qaybta kaalmada adeegyada, susanne carlos hayrachel brown . So [facility] [phone].    ATENCIÓN: Si habla español, tiene a chau disposición servicios gratuitos de asistencia lingüística. Llame al [phone].    We comply with applicable federal civil rights laws and [place] laws. We do not discriminate on the basis of race, color, national origin, age, disability, sex, sexual orientation, or gender identity.            Thank you!     Thank you for choosing [facility]  for your care. Our goal is always to provide you with excellent care. Hearing back from our patients is one way we can continue to improve our services. Please take a few minutes to complete the written survey that you may receive in the mail after your visit with us. Thank you!             Your Updated Medication List - Protect others around you: Learn how to safely use, store and throw away your medicines at www.disposemymeds.org.          This list is accurate as of 8/29/18 11:42  AM.  Always use your most recent med list.                   Brand Name Dispense Instructions for use Diagnosis    acarbose 25 MG tablet    PRECOSE    90 tablet    Take 1 tablet (25 mg) by mouth 3 times daily (with meals)    Type 2 diabetes mellitus with diabetic neuropathy, without long-term current use of insulin (H)       albuterol 108 (90 Base) MCG/ACT inhaler    VENTOLIN HFA    1 Inhaler    Inhale 2 puffs into the lungs every 4 hours as needed Pt will call to order    COPD (chronic obstructive pulmonary disease) (H)       ALLEGRA 180 MG tablet   Generic drug:  fexofenadine     30 tablet    Take 180 mg by mouth every evening        ascorbic acid 1000 MG Tabs    vitamin C     Take 1,000 mg by mouth daily In winter only        aspirin 81 MG tablet     1 tablet    Take 81 mg by mouth every evening.        blood glucose monitoring lancets     100 Each    use as directed to test blood sugars up to once daily; pt will call to order    Type II or unspecified type diabetes mellitus without mention of complication, not stated as uncontrolled       * blood glucose monitoring test strip    no brand specified    3 Month    Use as directed to check sugars twice a day    Type II or unspecified type diabetes mellitus without mention of complication, not stated as uncontrolled       * blood glucose monitoring test strip    ACCU-CHEK COMPACT DRUM    270 strip    Use to test blood sugar three times daily or as directed.    Obesity, unspecified, Type 2 diabetes, HbA1C goal < 8% (H)       cefuroxime 500 MG tablet    CEFTIN    20 tablet    Take 1 tablet (500 mg) by mouth 2 times daily for 10 days    Chronic bronchitis, unspecified chronic bronchitis type (H)       glipiZIDE 10 MG 24 hr tablet    glipiZIDE XL    180 tablet    Take 2 in the am    Type 2 diabetes mellitus without complication, without long-term current use of insulin (H)       ipratropium - albuterol 0.5 mg/2.5 mg/3 mL 0.5-2.5 (3) MG/3ML neb solution    DUONEB     Take  1 vial by nebulization every 6 hours as needed for shortness of breath / dyspnea or wheezing        ketoconazole 2 % shampoo    NIZORAL    120 mL    Apply daily then rinse. Pt will call to order    Seborrheic dermatitis       losartan 50 MG tablet    COZAAR    90 tablet    Take 1 tablet (50 mg) by mouth daily    Essential hypertension with goal blood pressure less than 130/80       metoprolol succinate 50 MG 24 hr tablet    TOPROL-XL    270 tablet    100 mg in am, 50 mg in pm    Essential hypertension with goal blood pressure less than 130/80       MUCINEX 600 MG 12 hr tablet   Generic drug:  guaiFENesin      Take 400-600 mg by mouth 2 times daily        nitroGLYcerin 0.4 MG sublingual tablet    NITROSTAT    25 tablet    Place 1 tablet (0.4 mg) under the tongue every 5 minutes as needed for chest pain    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    Peripheral polyneuropathy       simvastatin 20 MG tablet    ZOCOR    90 tablet    Take 1 tablet (20 mg) by mouth daily    Hyperlipidemia with target LDL less than 100       TYLENOL PM EXTRA STRENGTH  MG tablet   Generic drug:  diphenhydrAMINE-acetaminophen      Take 1 tablet by mouth nightly as needed for sleep        umeclidinium-vilanterol 62.5-25 MCG/INH oral inhaler    ANORO ELLIPTA     Inhale 1 puff into the lungs daily        warfarin 2 MG tablet    COUMADIN    150 tablet    Take 2 mg Mon & Fri; 4 mg rest of the week or as directed by Anticoagulation Clinic.    Chronic atrial fibrillation (H), Long term current use of anticoagulant therapy       * Notice:  This list has 2 medication(s) that are the same as other medications prescribed for you. Read the directions carefully, and ask your doctor or  other care provider to review them with you.

## 2018-08-29 NOTE — PATIENT INSTRUCTIONS
Thank you for choosing [facility].  You may be receiving a survey in the mail from Ariana Mendiola regarding your visit today.  Please take a few minutes to complete and return the survey to let us know how we are doing.      If you have questions or concerns, please contact us via [employer] or you can contact your care team at [phone].    Our Clinic hours are:  Monday 6:40 am  to 7:00 pm  Tuesday -Friday 6:40 am to 5:00 pm    The [place] outpatient lab hours are:  Monday - Friday 6:10 am to 4:45 pm  Saturdays 7:00 am to 11:00 am  Appointments are required, call [phone]    If you have clinical questions after hours or would like to schedule an appointment,  call the clinic at [phone].      (J42) Chronic bronchitis, unspecified chronic bronchitis type (H)  (primary encounter diagnosis)  Comment:   Plan: cefuroxime (CEFTIN) 500 MG tablet        We discussed the use of the antibiotic and refills are given. Prevention discussed and he will get the flu shot in a few weeks.     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 Refills on that are done.     (G62.9) Peripheral polyneuropathy  Comment:   Plan: pregabalin (LYRICA) 50 MG capsule        Use this as discussed and the written refill is done today for six months. Recheck then.

## 2018-08-29 NOTE — PROGRESS NOTES
SUBJECTIVE:   Stef Bhatt is a 73 year old male who presents to clinic today for the following health issues:      Diabetes Follow-up      Patient is checking blood sugars: He was until he started having respiratory illness issues.  Then he doesn't check due to that.  Will go by the A1C level.    Diabetic concerns: None     Symptoms of hypoglycemia (low blood sugar): When he was on the Acarbose he was having symptoms for the first 2 weeks.  Since being off it is better.     Paresthesias (numbness or burning in feet) or sores: Yes, numbness bilateral.  Left side is worse.  He is able to drive a car.  He doesn't drive to far without getting out.     Date of last diabetic eye exam: 4-27-18.  Will have this abstracted as it is in his Media Tab.    The A1c 6.7%    Diabetes Management Resources    Hyperlipidemia Follow-Up      Rate your low fat/cholesterol diet?: good    Taking statin?  Yes, no muscle aches from statin    Other lipid medications/supplements?:  None  Lab Results   Component Value Date    CHOL 125 09/12/2017     Lab Results   Component Value Date    HDL 33 09/12/2017     Lab Results   Component Value Date    LDL 57 09/12/2017     Lab Results   Component Value Date    TRIG 173 09/12/2017     Lab Results   Component Value Date    CHOLHDLRATIO 3.1 08/24/2015     Hypertension Follow-up      Outpatient blood pressures are being checked at home, off and on.  Results are Normal readings when checking.    Low Salt Diet: Will only add salt on corn or tomatoes.  Doesn't cook with it either.    BP Readings from Last 2 Encounters:   05/03/18 136/69   03/30/18 113/79     Hemoglobin A1C (%)   Date Value   08/22/2018 6.7 (H)   04/27/2018 8.1 (H)     LDL Cholesterol Calculated (mg/dL)   Date Value   09/12/2017 57   08/24/2016 54       Amount of exercise or physical activity: None, due to his breathing and walking issues.    Problems taking medications regularly: No    Medication side effects: none    Diet: low salt,  low fat/cholesterol and carbohydrate counting        Current Outpatient Prescriptions:      ACCU-CHEK SOFTCLIX LANCETS MISC, use as directed to test blood sugars up to once daily; pt will call to order, Disp: 100 Each, Rfl: 11     albuterol (VENTOLIN HFA) 108 (90 BASE) MCG/ACT inhaler, Inhale 2 puffs into the lungs every 4 hours as needed Pt will call to order, Disp: 1 Inhaler, Rfl: 11     aspirin 81 MG tablet, Take 81 mg by mouth every evening., Disp: 1 tablet, Rfl: 3     blood glucose (ACCU-CHEK COMPACT DRUM) test strip, Use to test blood sugar three times daily or as directed., Disp: 270 strip, Rfl: 1     cefuroxime (CEFTIN) 500 MG tablet, Take 500 mg by mouth 2 times daily, Disp: , Rfl:      diphenhydrAMINE-acetaminophen (TYLENOL PM EXTRA STRENGTH)  MG tablet, Take 1 tablet by mouth nightly as needed for sleep, Disp: , Rfl:      fexofenadine (ALLEGRA) 180 MG tablet, Take 180 mg by mouth every evening , Disp: 30 tablet, Rfl: 1     glipiZIDE (GLIPIZIDE XL) 10 MG 24 hr tablet, Take 2 in the am, Disp: 180 tablet, Rfl: 3     glucose blood VI test strips strip, Use as directed to check sugars twice a day, Disp: 3 Month, Rfl: 3     guaiFENesin (MUCINEX) 600 MG 12 hr tablet, Take 400-600 mg by mouth 2 times daily , Disp: , Rfl:      ipratropium - albuterol 0.5 mg/2.5 mg/3 mL (DUONEB) 0.5-2.5 (3) MG/3ML nebulization, Take 1 vial by nebulization every 6 hours as needed for shortness of breath / dyspnea or wheezing, Disp: , Rfl:      losartan (COZAAR) 50 MG tablet, Take 1 tablet (50 mg) by mouth daily, Disp: 90 tablet, Rfl: 3     metoprolol succinate (TOPROL-XL) 50 MG 24 hr tablet, 100 mg in am, 50 mg in pm, Disp: 270 tablet, Rfl: 3     nitroGLYcerin (NITROSTAT) 0.4 MG sublingual tablet, Place 1 tablet (0.4 mg) under the tongue every 5 minutes as needed for chest pain, Disp: 25 tablet, Rfl: 11     predniSONE (DELTASONE) 10 MG tablet, Take 10 mg by mouth daily, Disp: , Rfl:      pregabalin (LYRICA) 50 MG capsule, Take  "2 in the am, 2 in the pm., Disp: 120 capsule, Rfl: 5     simvastatin (ZOCOR) 20 MG tablet, Take 1 tablet (20 mg) by mouth daily, Disp: 90 tablet, Rfl: 3     umeclidinium-vilanterol (ANORO ELLIPTA) 62.5-25 MCG/INH oral inhaler, Inhale 1 puff into the lungs daily, Disp: , Rfl:      warfarin (COUMADIN) 2 MG tablet, Take 2 mg Mon & Fri; 4 mg rest of the week or as directed by Anticoagulation Clinic., Disp: 150 tablet, Rfl: 3     acarbose (PRECOSE) 25 MG tablet, Take 1 tablet (25 mg) by mouth 3 times daily (with meals) (Patient not taking: Reported on 8/29/2018), Disp: 90 tablet, Rfl: 11     ascorbic acid (VITAMIN C) 1000 MG TABS, Take 1,000 mg by mouth daily In winter only, Disp: , Rfl:      ketoconazole (NIZORAL) 2 % shampoo, Apply daily then rinse. Pt will call to order, Disp: 120 mL, Rfl: 11    Patient Active Problem List   Diagnosis     Coronary atherosclerosis     Essential hypertension     Hemorrhage of rectum and anus     Allergic Rhinitis     AK (actinic keratosis)     HYPERLIPIDEMIA LDL GOAL <100     Ventral hernia     Colon polyp     Dysphonia     Senile nuclear sclerosis     Acute myocardial infarction of other specified sites, episode of care unspecified     Health Care Home     Atrial fibrillation (H)     Advance Care Planning     Edge's neuroma     COPD (chronic obstructive pulmonary disease) (H)     Long term current use of anticoagulant therapy     Hyperlipidemia with target LDL less than 100     Morbid obesity (H)     Type 2 diabetes mellitus with diabetic neuropathy (H)     Peripheral polyneuropathy     Chronic bronchitis, unspecified chronic bronchitis type (H)     DJD (degenerative joint disease), cervical     Benign neoplasm of skin of trunk, except scrotum     Decubitus ulcer of buttock, stage 1, unspecified laterality       Blood pressure 109/65, pulse 72, temperature 97.9  F (36.6  C), temperature source Tympanic, height 5' 10\" (1.778 m), weight 242 lb (109.8 kg), SpO2 94 %.    Exam:  GENERAL " APPEARANCE: healthy, alert and no distress  EYES: EOMI,  PERRL  NECK: no adenopathy, no asymmetry, masses, or scars and thyroid normal to palpation  RESP: lungs clear to auscultation - no rales, rhonchi or wheezes  CV: regular rates and rhythm, normal S1 S2, no S3 or S4 and no murmur, click or rub -  MS: arthritis of the hands noted bilaterally  SKIN: no suspicious lesions or rashes  PSYCH: mentation appears normal and affect normal/bright      (J42) Chronic bronchitis, unspecified chronic bronchitis type (H)  (primary encounter diagnosis)  Comment:   Plan: cefuroxime (CEFTIN) 500 MG tablet        We discussed the use of the antibiotic and refills are given. Prevention discussed and he will get the flu shot in a few weeks.     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 Refills on that are done.     (G62.9) Peripheral polyneuropathy  Comment:   Plan: pregabalin (LYRICA) 50 MG capsule        Use this as discussed and the written refill is done today for six months. Recheck then.     Marcelino Foster

## 2018-09-26 ENCOUNTER — ALLIED HEALTH/NURSE VISIT (OUTPATIENT)
Dept: FAMILY MEDICINE | Facility: CLINIC | Age: 73
End: 2018-09-26
Payer: MEDICARE

## 2018-09-26 DIAGNOSIS — Z23 NEED FOR PROPHYLACTIC VACCINATION AND INOCULATION AGAINST INFLUENZA: Primary | ICD-10-CM

## 2018-09-26 PROCEDURE — G0008 ADMIN INFLUENZA VIRUS VAC: HCPCS

## 2018-09-26 PROCEDURE — 90662 IIV NO PRSV INCREASED AG IM: CPT

## 2018-09-26 NOTE — PROGRESS NOTES

## 2018-09-26 NOTE — MR AVS SNAPSHOT
After Visit Summary   9/26/2018    Stef Bhatt    MRN: [id number]           Patient Information     Date Of Birth          1945        Visit Information        Provider Department      9/26/2018 2:00 PM [facility]        Today's Diagnoses     Need for prophylactic vaccination and inoculation against influenza    -  1       Follow-ups after your visit        Your next 10 appointments already scheduled     Oct 18, 2018  1:00 PM CDT   Anticoagulation Visit with [place] [facility]   [facility]    [address] [address] [phone]              Who to contact     If you have questions or need follow up information about today's clinic visit or your schedule please contact [facility] directly at [phone].  Normal or non-critical lab and imaging results will be communicated to you by MyChart, letter or phone within 4 business days after the clinic has received the results. If you do not hear from us within 7 days, please contact the clinic through MyChart or phone. If you have a critical or abnormal lab result, we will notify you by phone as soon as possible.  Submit refill requests through [employer] or call your pharmacy and they will forward the refill request to us. Please allow 3 business days for your refill to be completed.          Additional Information About Your Visit        Care EveryWhere ID     This is your Care EveryWhere ID. This could be used by other organizations to access your [facility] medical records  [id number]         Blood Pressure from Last 3 Encounters:   08/29/18 109/65   05/03/18 136/69   03/30/18 113/79    Weight from Last 3 Encounters:   08/29/18 242 lb (109.8 kg)   05/03/18 245 lb (111.1 kg)   03/30/18 248 lb (112.5 kg)              We Performed the Following     ADMIN INFLUENZA (For MEDICARE Patients ONLY) []     FLU VACCINE, INCREASED ANTIGEN, PRESV  FREE, AGE 65+ [71395]        Primary Care Provider Office Phone # Fax #    Marcelino Roque Foster [phone] [address] [address]        Equal Access to Services     BARBI ROMERO : Hadii janna ambriz hadjose miguelo Soomaali, waaxda luqadaha, qaybta kaalmada adeegyada, susanne roseyin hayaan carlozrg gar stephanie padilla. So [facility] [phone].    ATENCIÓN: Si habla español, tiene a chau disposición servicios gratuitos de asistencia lingüística. Llame al [phone].    We comply with applicable federal civil rights laws and [place] laws. We do not discriminate on the basis of race, color, national origin, age, disability, sex, sexual orientation, or gender identity.            Thank you!     Thank you for choosing [facility]  for your care. Our goal is always to provide you with excellent care. Hearing back from our patients is one way we can continue to improve our services. Please take a few minutes to complete the written survey that you may receive in the mail after your visit with us. Thank you!             Your Updated Medication List - Protect others around you: Learn how to safely use, store and throw away your medicines at www.disposemymeds.org.          This list is accurate as of 9/26/18  2:05 PM.  Always use your most recent med list.                   Brand Name Dispense Instructions for use Diagnosis    acarbose 25 MG tablet    PRECOSE    90 tablet    Take 1 tablet (25 mg) by mouth 3 times daily (with meals)    Type 2 diabetes mellitus with diabetic neuropathy, without long-term current use of insulin (H)       albuterol 108 (90 Base) MCG/ACT inhaler    VENTOLIN HFA    1 Inhaler    Inhale 2 puffs into the lungs every 4 hours as needed Pt will call to order    COPD (chronic obstructive pulmonary disease) (H)       ALLEGRA 180 MG tablet   Generic drug:  fexofenadine     30 tablet    Take 180 mg by mouth every evening        ascorbic acid 1000 MG Tabs    vitamin C     Take  1,000 mg by mouth daily In winter only        aspirin 81 MG tablet     1 tablet    Take 81 mg by mouth every evening.        blood glucose monitoring lancets     100 Each    use as directed to test blood sugars up to once daily; pt will call to order    Type II or unspecified type diabetes mellitus without mention of complication, not stated as uncontrolled       * blood glucose monitoring test strip    no brand specified    3 Month    Use as directed to check sugars twice a day    Type II or unspecified type diabetes mellitus without mention of complication, not stated as uncontrolled       * blood glucose monitoring test strip    ACCU-CHEK COMPACT DRUM    270 strip    Use to test blood sugar three times daily or as directed.    Obesity, unspecified, Type 2 diabetes, HbA1C goal < 8% (H)       glipiZIDE 10 MG 24 hr tablet    glipiZIDE XL    180 tablet    Take 2 in the am    Type 2 diabetes mellitus without complication, without long-term current use of insulin (H)       ipratropium - albuterol 0.5 mg/2.5 mg/3 mL 0.5-2.5 (3) MG/3ML neb solution    DUONEB     Take 1 vial by nebulization every 6 hours as needed for shortness of breath / dyspnea or wheezing        ketoconazole 2 % shampoo    NIZORAL    120 mL    Apply daily then rinse. Pt will call to order    Seborrheic dermatitis       losartan 50 MG tablet    COZAAR    90 tablet    Take 1 tablet (50 mg) by mouth daily    Essential hypertension with goal blood pressure less than 130/80       metoprolol succinate 50 MG 24 hr tablet    TOPROL-XL    270 tablet    100 mg in am, 50 mg in pm    Essential hypertension with goal blood pressure less than 130/80       MUCINEX 600 MG 12 hr tablet   Generic drug:  guaiFENesin      Take 400-600 mg by mouth 2 times daily        nitroGLYcerin 0.4 MG sublingual tablet    NITROSTAT    25 tablet    Place 1 tablet (0.4 mg) under the tongue every 5 minutes as needed for chest pain    Atherosclerosis of coronary artery bypass graft with  angina pectoris with documented spasm, unspecified whether native or transplanted heart (H)       predniSONE 10 MG tablet    DELTASONE     Take 10 mg by mouth daily        pregabalin 50 MG capsule    LYRICA    120 capsule    Take 2 in the am, 2 in the pm.    Peripheral polyneuropathy       simvastatin 20 MG tablet    ZOCOR    90 tablet    Take 1 tablet (20 mg) by mouth daily    Hyperlipidemia with target LDL less than 100       TYLENOL PM EXTRA STRENGTH  MG tablet   Generic drug:  diphenhydrAMINE-acetaminophen      Take 1 tablet by mouth nightly as needed for sleep        umeclidinium-vilanterol 62.5-25 MCG/INH oral inhaler    ANORO ELLIPTA     Inhale 1 puff into the lungs daily        warfarin 2 MG tablet    COUMADIN    150 tablet    Take 2 mg Mon & Fri; 4 mg rest of the week or as directed by Anticoagulation Clinic.    Chronic atrial fibrillation (H), Long term current use of anticoagulant therapy       * Notice:  This list has 2 medication(s) that are the same as other medications prescribed for you. Read the directions carefully, and ask your doctor or other care provider to review them with you.

## 2018-10-18 ENCOUNTER — ANTICOAGULATION THERAPY VISIT (OUTPATIENT)
Dept: ANTICOAGULATION | Facility: CLINIC | Age: 73
End: 2018-10-18
Payer: MEDICARE

## 2018-10-18 ENCOUNTER — TELEPHONE (OUTPATIENT)
Dept: ANTICOAGULATION | Facility: CLINIC | Age: 73
End: 2018-10-18

## 2018-10-18 DIAGNOSIS — I48.91 ATRIAL FIBRILLATION (H): ICD-10-CM

## 2018-10-18 DIAGNOSIS — Z79.01 LONG TERM CURRENT USE OF ANTICOAGULANT THERAPY: ICD-10-CM

## 2018-10-18 LAB — INR POINT OF CARE: 2.7 (ref 0.86–1.14)

## 2018-10-18 PROCEDURE — 36416 COLLJ CAPILLARY BLOOD SPEC: CPT

## 2018-10-18 PROCEDURE — 99207 ZZC NO CHARGE NURSE ONLY: CPT

## 2018-10-18 PROCEDURE — 85610 PROTHROMBIN TIME: CPT | Mod: QW

## 2018-10-18 NOTE — MR AVS SNAPSHOT
Stef Bhatt   10/18/2018 1:00 PM   Anticoagulation Therapy Visit    Description:  73 year old male   Provider:  [place] [facility]   Department:  [facility]           INR as of 10/18/2018     Today's INR 2.7      Anticoagulation Summary as of 10/18/2018     INR goal 2.0-3.0   Today's INR 2.7   Full warfarin instructions 2 mg on Mon, Fri; 4 mg all other days   Next INR check 12/20/2018    Indications   Atrial fibrillation (H) [I48.91]  Long term current use of anticoagulant therapy [Z79.01]         Your next Anticoagulation Clinic appointment(s)     Dec 20, 2018  1:00 PM CST   Anticoagulation Visit with [place] [facility]   [facility]    [address] [address] [phone]              Contact Numbers     Please call [phone] with any problems or questions regarding your therapy.    If you need to cancel and/or reschedule your appointment please call one of the following numbers:  [facility] [phone]  [place] - [phone]  [place] - [phone]  [facility] [phone]  [place] - [phone]            October 2018 Details    Sun Mon Tue Wed Thu Fri Sat      1               2               3               4               5               6                 7               8               9               10               11               12               13                 14               15               16               17               18      4 mg   See details      19      2 mg         20      4 mg           21      4 mg         22      2 mg         23      4 mg         24      4 mg         25      4 mg         26      2 mg         27      4 mg           28      4 mg         29      2 mg         30      4 mg         31      4 mg             Date Details   10/18 This INR check               How to take your warfarin dose     To take:  2 mg Take 1 of the 2 mg tablets.    To take:  4 mg Take 2 of the 2 mg tablets.           November  2018 Details    Sun Mon Tue Wed Thu Fri Sat         1      4 mg         2      2 mg         3      4 mg           4      4 mg         5      2 mg         6      4 mg         7      4 mg         8      4 mg         9      2 mg         10      4 mg           11      4 mg         12      2 mg         13      4 mg         14      4 mg         15      4 mg         16      2 mg         17      4 mg           18      4 mg         19      2 mg         20      4 mg         21      4 mg         22      4 mg         23      2 mg         24      4 mg           25      4 mg         26      2 mg         27      4 mg         28      4 mg         29      4 mg         30      2 mg           Date Details   No additional details            How to take your warfarin dose     To take:  2 mg Take 1 of the 2 mg tablets.    To take:  4 mg Take 2 of the 2 mg tablets.           December 2018 Details    Sun Mon Tue Wed Thu Fri Sat           1      4 mg           2      4 mg         3      2 mg         4      4 mg         5      4 mg         6      4 mg         7      2 mg         8      4 mg           9      4 mg         10      2 mg         11      4 mg         12      4 mg         13      4 mg         14      2 mg         15      4 mg           16      4 mg         17      2 mg         18      4 mg         19      4 mg         20            21               22                 23               24               25               26               27               28               29                 30               31                     Date Details   No additional details    Date of next INR:  12/20/2018         How to take your warfarin dose     To take:  2 mg Take 1 of the 2 mg tablets.    To take:  4 mg Take 2 of the 2 mg tablets.

## 2018-10-18 NOTE — PROGRESS NOTES
ADDENDUM 10/18/2018:  Per Dr. Cristian Castro, Yes, Stef may continue the every 9 week checks if stable.   Marcelino Arboleda, RN on 10/18/2018 at 1:29 PM    ANTICOAGULATION FOLLOW-UP CLINIC VISIT    Patient Name:  Stef Bhatt  Date:  10/18/2018  Contact Type:  Face to Face    SUBJECTIVE:     Patient Findings     Positives No Problem Findings    Comments No changes in medications, activity, or diet noted. No bleeding or increased bruising noted. Took warfarin as prescribed.  Pt did comment about hemorrhoids but this is his normal - Sx have not worsened.  Patient is to continue maintenance warfarin plan, and check INR in 9 weeks.  Telephone encounter routed to Dr. Foster for an ok to continue INR rechecks q 9 weeks when INR is therapeutic.   Patient verbalizes understanding and agrees to plan. No further questions or concerns.           OBJECTIVE    INR Protime   Date Value Ref Range Status   10/18/2018 2.7 (A) 0.86 - 1.14 Final       ASSESSMENT / PLAN  INR assessment THER    Recheck INR In: 9 WEEKS    INR Location Clinic      Anticoagulation Summary as of 10/18/2018     INR goal 2.0-3.0   Today's INR 2.7   Warfarin maintenance plan 2 mg (2 mg x 1) on Mon, Fri; 4 mg (2 mg x 2) all other days   Full warfarin instructions 2 mg on Mon, Fri; 4 mg all other days   Weekly warfarin total 24 mg   No change documented Gloria Arboleda, RN   Plan last modified Noelle Harrington RN (4/6/2015)   Next INR check 12/20/2018   Priority INR   Target end date Indefinite    Indications   Atrial fibrillation (H) [I48.91]  Long term current use of anticoagulant therapy [Z79.01]         Anticoagulation Episode Summary     INR check location     Preferred lab     Send INR reminders to [facility]    Comments * warfarin 2 mg tabs. Dr Shelley Lennox is pulmonologist. Likes 9 week rechecks      Anticoagulation Care Providers     Provider Role Specialty Phone number    Marcelino Foster MD  [facility] [phone]            See the Encounter Report to view Anticoagulation Flowsheet and Dosing Calendar (Go to Encounters tab in chart review, and find the Anticoagulation Therapy Visit)        Gloria Arboleda RN

## 2018-12-05 ENCOUNTER — ANTICOAGULATION THERAPY VISIT (OUTPATIENT)
Dept: ANTICOAGULATION | Facility: CLINIC | Age: 73
End: 2018-12-05
Payer: MEDICARE

## 2018-12-05 DIAGNOSIS — Z79.01 LONG TERM CURRENT USE OF ANTICOAGULANT THERAPY: ICD-10-CM

## 2018-12-05 DIAGNOSIS — I48.91 ATRIAL FIBRILLATION (H): ICD-10-CM

## 2018-12-05 LAB — INR POINT OF CARE: 2.7 (ref 0.86–1.14)

## 2018-12-05 PROCEDURE — 36416 COLLJ CAPILLARY BLOOD SPEC: CPT

## 2018-12-05 PROCEDURE — 99207 ZZC NO CHARGE NURSE ONLY: CPT

## 2018-12-05 PROCEDURE — 85610 PROTHROMBIN TIME: CPT | Mod: QW

## 2018-12-05 NOTE — MR AVS SNAPSHOT
Stef Bhatt   12/5/2018 3:00 PM   Anticoagulation Therapy Visit    Description:  73 year old male   Provider:  [facility] [facility]   Department:  [facility]           INR as of 12/5/2018     Today's INR 2.7      Anticoagulation Summary as of 12/5/2018     INR goal 2.0-3.0   Today's INR 2.7   Full warfarin instructions 2 mg on Mon, Fri; 4 mg all other days   Next INR check 2/7/2019    Indications   Atrial fibrillation (H) [I48.91]  Long term current use of anticoagulant therapy [Z79.01]         Your next Anticoagulation Clinic appointment(s)     Feb 07, 2019  1:30 PM CST   Anticoagulation Visit with [place] [facility]   [facility]    [address] [address] [phone]              Contact Numbers     Please call [phone] with any problems or questions regarding your therapy.    If you need to cancel and/or reschedule your appointment please call one of the following numbers:  [facility] [phone]  [place] - [phone]  [place] - [phone]  [place] - [phone]  [place] - [phone]            December 2018 Details    Sun Mon Tue Wed Thu Fri Sat           1                 2               3               4               5      4 mg   See details      6      4 mg         7      2 mg         8      4 mg           9      4 mg         10      2 mg         11      4 mg         12      4 mg         13      4 mg         14      2 mg         15      4 mg           16      4 mg         17      2 mg         18      4 mg         19      4 mg         20      4 mg         21      2 mg         22      4 mg           23      4 mg         24      2 mg         25      4 mg         26      4 mg         27      4 mg         28      2 mg         29      4 mg           30      4 mg         31      2 mg               Date Details   12/05 This INR check               How to take your warfarin dose     To take:  2 mg Take 1 of the 2 mg tablets.    To  take:  4 mg Take 2 of the 2 mg tablets.           January 2019 Details    Sun Mon Tue Wed Thu Fri Sat       1      4 mg         2      4 mg         3      4 mg         4      2 mg         5      4 mg           6      4 mg         7      2 mg         8      4 mg         9      4 mg         10      4 mg         11      2 mg         12      4 mg           13      4 mg         14      2 mg         15      4 mg         16      4 mg         17      4 mg         18      2 mg         19      4 mg           20      4 mg         21      2 mg         22      4 mg         23      4 mg         24      4 mg         25      2 mg         26      4 mg           27      4 mg         28      2 mg         29      4 mg         30      4 mg         31      4 mg            Date Details   No additional details            How to take your warfarin dose     To take:  2 mg Take 1 of the 2 mg tablets.    To take:  4 mg Take 2 of the 2 mg tablets.           February 2019 Details    Sun Mon Tue Wed Thu Fri Sat          1      2 mg         2      4 mg           3      4 mg         4      2 mg         5      4 mg         6      4 mg         7            8               9                 10               11               12               13               14               15               16                 17               18               19               20               21               22               23                 24               25               26               27               28                  Date Details   No additional details    Date of next INR:  2/7/2019         How to take your warfarin dose     To take:  2 mg Take 1 of the 2 mg tablets.    To take:  4 mg Take 2 of the 2 mg tablets.

## 2018-12-05 NOTE — PROGRESS NOTES
ANTICOAGULATION FOLLOW-UP CLINIC VISIT    Patient Name:  Stef Bhatt  Date:  12/5/2018  Contact Type:  Face to Face    SUBJECTIVE:     Patient Findings     Positives Dental/Other procedures (12/6)    Comments No changes in medications, activity, or diet noted. No bleeding or increased bruising noted. Took warfarin as prescribed.  Patient will continue weekly maintenance dose. INR is therapeutic.   Recheck INR in 9 weeks.   Patient verbalizes understanding and agrees to plan. No further questions or concerns.             OBJECTIVE    INR Protime   Date Value Ref Range Status   12/05/2018 2.7 (A) 0.86 - 1.14 Final       ASSESSMENT / PLAN  INR assessment THER    Recheck INR In: 9 WEEKS    INR Location Clinic      Anticoagulation Summary as of 12/5/2018     INR goal 2.0-3.0   Today's INR 2.7   Warfarin maintenance plan 2 mg (2 mg x 1) on Mon, Fri; 4 mg (2 mg x 2) all other days   Full warfarin instructions 2 mg on Mon, Fri; 4 mg all other days   Weekly warfarin total 24 mg   No change documented Bindu Callejas RN   Plan last modified Noelle Harrington RN (4/6/2015)   Next INR check 2/7/2019   Priority INR   Target end date Indefinite    Indications   Atrial fibrillation (H) [I48.91]  Long term current use of anticoagulant therapy [Z79.01]         Anticoagulation Episode Summary     INR check location     Preferred lab     Send INR reminders to [facility]    Comments * warfarin 2 mg tabs. Dr Shelley Lennox is pulmonologist. 10/18/18 okay for 9 week rechecks per Dr. Foster      Anticoagulation Care Providers     Provider Role Specialty Phone number    Marcelino Foster MD [facility] [phone]            See the Encounter Report to view Anticoagulation Flowsheet and Dosing Calendar (Go to Encounters tab in chart review, and find the Anticoagulation Therapy Visit)        Bindu Callejas, RN

## 2018-12-20 ENCOUNTER — TRANSFERRED RECORDS (OUTPATIENT)
Dept: HEALTH INFORMATION MANAGEMENT | Facility: CLINIC | Age: 73
End: 2018-12-20

## 2019-01-21 ENCOUNTER — ANCILLARY PROCEDURE (OUTPATIENT)
Dept: GENERAL RADIOLOGY | Facility: CLINIC | Age: 74
End: 2019-01-21
Payer: MEDICARE

## 2019-01-21 ENCOUNTER — OFFICE VISIT (OUTPATIENT)
Dept: ORTHOPEDICS | Facility: CLINIC | Age: 74
End: 2019-01-21
Payer: MEDICARE

## 2019-01-21 VITALS — DIASTOLIC BLOOD PRESSURE: 84 MMHG | SYSTOLIC BLOOD PRESSURE: 130 MMHG

## 2019-01-21 DIAGNOSIS — M21.372 ACQUIRED LEFT FOOT DROP: ICD-10-CM

## 2019-01-21 DIAGNOSIS — M25.561 ACUTE PAIN OF RIGHT KNEE: Primary | ICD-10-CM

## 2019-01-21 DIAGNOSIS — M25.561 RIGHT KNEE PAIN: ICD-10-CM

## 2019-01-21 PROCEDURE — 73562 X-RAY EXAM OF KNEE 3: CPT

## 2019-01-21 PROCEDURE — 99203 OFFICE O/P NEW LOW 30 MIN: CPT | Performed by: FAMILY MEDICINE

## 2019-01-21 NOTE — PROGRESS NOTES
ASSESSMENT & PLAN  Stef was seen today for pain.    Diagnoses and all orders for this visit:    Acute pain of right knee  -     XR Knee Standing AP Bilat [place] Bilat Lat Right; Future  -     order for DME; Equipment being ordered: neoprene large hinged knee brace    Acquired left foot drop  -     ORTHOTICS REFERRAL (Foot and Ankle)    Right knee pain: Tenderness to palpation notably over the MCL with x-rays negative for acute fracture with possible intra-articular body.  No locking symptoms noted on history.  He does have some significant pain with valgus stressing.  Etiology likely grade 1/2 MCL sprain.  Will give patient hinged knee brace, rest for 1 week and then encouraged recumbent bike to help build up quad/hamstring strengthening.  The patient to follow-up in 1 month if symptoms are improved.    Left foot drop: Etiology secondary to significant lumbar stenosis with residual foot drop.  He has 1-2 out of 4 strength on the left.  Given concern for fall will place order for AFO and follow-up if symptoms of limited gait persist despite this.    -----    SUBJECTIVE  Stef Bhatt is a/an 73 year old male who is seen in consultation for evaluation of right knee pain. The patient is seen with their wife.    Onset: 1/15/19, 1 week(s) ago. Patient describes injury as going up step twisted knee and felt a sharp increased pain.    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      Obesity, unspecified      Pure hypercholesterolemia      Tobacco use disorder     quit in 2005     Trigger finger (acquired)      Type II or unspecified type diabetes mellitus without mention of complication, not stated as uncontrolled      Social History     Socioeconomic History     Marital status:      Spouse name: Not on file     Number of children: Not on file     Years of education: Not on file     Highest education level: Not on file   Social Needs     Financial resource strain: Not on file     Food insecurity - worry: Not on file     Food insecurity - inability: Not on file     Transportation needs - medical: Not on file     Transportation needs - non-medical: Not on file   Occupational History     Not on file   Tobacco Use     Smoking status: Former Smoker     Packs/day: 1.00     Types: Cigarettes, Cigars     Last attempt to quit: 2006     Years since quittin.0     Smokeless tobacco: Never Used     Tobacco comment: cigar once in a blue moon-quit again about 6 weeks ago   Substance and Sexual Activity     Alcohol use: Yes     Comment: occasional     Drug use: No     Sexual activity: Not on file   Other Topics Concern     Parent/sibling w/ CABG, MI or angioplasty before 65F 55M? No   Social History Narrative     Not on file         Patient's past medical, surgical, social, and family histories were reviewed today and no changes are noted.    REVIEW OF SYSTEMS:  10 point ROS is negative other than symptoms noted above in HPI, Past Medical History or as stated below  Constitutional: NEGATIVE for fever, chills, change in weight  Skin: NEGATIVE for worrisome rashes, moles or lesions  GI/: NEGATIVE for bowel or bladder changes  Neuro: NEGATIVE for weakness, dizziness or paresthesias    OBJECTIVE:  /84    General: healthy, alert and in no distress  HEENT: no scleral icterus or conjunctival erythema  Skin: no suspicious lesions or rash. No jaundice.  CV: no pedal edema  Resp: normal  respiratory effort without conversational dyspnea   Psych: normal mood and affect  Gait: normal steady gait with appropriate coordination and balance  Neuro: Normal light sensory exam of lower extremity  MSK:  LEFT KNEE  Inspection:    normal alignment  Palpation:    Tender about the MCL. Remainder of bony and ligamentous landmarks are nontender.    No effusion is present    Patellofemoral crepitus is Present  Range of Motion:     00 extension to 1350 flexion  Strength:    Quadriceps 5/5, hamstrings 5/5, gastrocsoleus 5/5 and tibialis anterior 5/5    Extensor mechanism intact  Special Tests:    Positive: MCL/valgus stress (0 & 30 deg)    Negative: Patellar grind, patellar apprehension, LCL/varus stress (0 & 30 deg), Lachman's, anterior drawer, posterior drawer, Paula's    Left LE exam:  Foot drop, 1/4 on ankle dorsiflexion, 5-/5 on plantar flexion, 5/5 knee flex/extension    Independent visualization of the below image:  No results found for this or any previous visit (from the past 24 hour(s)).  KNEE STANDING AP BILATERAL [place] BILATERAL LATERAL RIGHT   1/21/2019  2:04 PM      HISTORY: Right knee pain.     COMPARISON: None.                                                                      IMPRESSION: No acute fractures on either side. 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 A small ossicle projected over the  right knee lateral compartment on the frontal view. This could just be  a small osteophyte, but a very small loose body is also possible.  Bilateral leg soft tissue vascular calcifications noted.     LUIS ACEVEDO MD    Patient's conditions were thoroughly discussed during today's visit with greater than 50% of the visit spent counseling the patient with total time spent face-to-face with the patient being 30 minutes.    Rober Lemons MD, [facility]

## 2019-01-21 NOTE — PATIENT INSTRUCTIONS
1) Work on exercise bike on a difficult level  2) Wear hinged knee brace for the next 2-3 weeks  3) Follow-up if not improved 4 weeks  Patient Education     Treating Medial Collateral Ligament (MCL) Problems  There are 2 options for treating an MCL injury: nonsurgical and surgical. Nonsurgical treatment is used much more often. With either one, rehab will be part of your treatment.  Pre-op checklist    Stop taking aspirin and other medicines 7 days before surgery, or as your healthcare provider directs.    Arrange to get properly sized crutches to use as you heal.    Don t eat or drink 10 to 12 hours before surgery (or as your healthcare provider directs).    Arrange for someone to drive you home after surgery.   Nonsurgical treatment  This treatment starts with rest, ice, and elevation. This eases pain and swelling. In the next stage, you start exercises to improve your knee s range of motion, strength, and flexibility. You may need a brace for weeks after your injury. Using crutches or a brace rests your joint, helping it to heal.     Your healthcare provider may secure the ligament to the bone with screws.       Your healthcare provider may stitch or staple your ligament together.    Surgery  Surgery is seldom used to fix an MCL injury. But sometimes it is advised, especially if some other part of your knee is hurt. Open surgery is used to screw or stitch the MCL back into place. If fixing the original MCL is not possible, an MCL graft may be used. Based on their location, other knee injuries may be fixed using arthroscopy. With arthroscopy, a tiny camera lets your healthcare provider see inside the joint. Tools are put in through small cuts to fix the joint.  After surgery  Right after surgery, you ll spend a few hours in a recovery unit. Your knee will be bandaged. Ice will be applied, and your leg raised. Depending on the surgery performed, physical therapy may start shortly after. A brace and crutches  are often used after surgery. You may have limits on weight bearing and activity while you heal.  Date Last Reviewed: 1/1/2018 2000-2018 [email or web address]. [address] [place] [address], [place] [address]. All rights reserved. This information is not intended as a substitute for professional medical care. Always follow your healthcare professional's instructions.

## 2019-01-21 NOTE — LETTER
1/21/2019         RE: Stef Bhatt  [address] [place] [address]        Dear Colleague,    Thank you for referring your patient, Stef Bhatt, to the [facility]. Please see a copy of my visit note below.    ASSESSMENT & PLAN  Stef was seen today for pain.    Diagnoses and all orders for this visit:    Acute pain of right knee  -     XR Knee Standing AP Bilat [place] Bilat Lat Right; Future  -     order for DME; Equipment being ordered: neoprene large hinged knee brace    Acquired left foot drop  -     ORTHOTICS REFERRAL (Foot and Ankle)    Right knee pain: Tenderness to palpation notably over the MCL with x-rays negative for acute fracture with possible intra-articular body.  No locking symptoms noted on history.  He does have some significant pain with valgus stressing.  Etiology likely grade 1/2 MCL sprain.  Will give patient hinged knee brace, rest for 1 week and then encouraged recumbent bike to help build up quad/hamstring strengthening.  The patient to follow-up in 1 month if symptoms are improved.    Left foot drop: Etiology secondary to significant lumbar stenosis with residual foot drop.  He has 1-2 out of 4 strength on the left.  Given concern for fall will place order for AFO and follow-up if symptoms of limited gait persist despite this.    -----    SUBJECTIVE  Stef Bhatt is a/an 73 year old male who is seen in consultation for evaluation of right knee pain. The patient is seen with their wife.    Onset: 1/15/19, 1 week(s) ago. Patient describes injury as going up step twisted knee and felt a sharp increased pain.    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     Obesity, unspecified      Pure hypercholesterolemia      Tobacco use disorder     quit in 2005     Trigger finger (acquired)      Type II or unspecified type diabetes mellitus without mention of complication, not stated as uncontrolled      Social History     Socioeconomic History     Marital status:      Spouse name: Not on file     Number of children: Not on file     Years of education: Not on file     Highest education level: Not on file   Social Needs     Financial resource strain: Not on file     Food insecurity - worry: Not on file     Food insecurity - inability: Not on file     Transportation needs - medical: Not on file     Transportation needs - non-medical: Not on file   Occupational History     Not on file   Tobacco Use     Smoking status: Former Smoker     Packs/day: 1.00     Types: Cigarettes, Cigars     Last attempt to quit: 2006     Years since quittin.0     Smokeless tobacco: Never Used     Tobacco comment: cigar once in a blue moon-quit again about 6 weeks ago   Substance and Sexual Activity     Alcohol use: Yes     Comment: occasional     Drug use: No     Sexual activity: Not on file   Other Topics Concern     Parent/sibling w/ CABG, MI or angioplasty before 65F 55M? No   Social History Narrative     Not on file         Patient's past medical, surgical, social, and family histories were reviewed today and no changes are noted.    REVIEW OF SYSTEMS:  10 point ROS is negative other than symptoms noted above in HPI, Past Medical History or as stated below  Constitutional: NEGATIVE for fever, chills, change in weight  Skin: NEGATIVE for worrisome rashes, moles or lesions  GI/: NEGATIVE for bowel or bladder  changes  Neuro: NEGATIVE for weakness, dizziness or paresthesias    OBJECTIVE:  /84    General: healthy, alert and in no distress  HEENT: no scleral icterus or conjunctival erythema  Skin: no suspicious lesions or rash. No jaundice.  CV: no pedal edema  Resp: normal respiratory effort without conversational dyspnea   Psych: normal mood and affect  Gait: normal steady gait with appropriate coordination and balance  Neuro: Normal light sensory exam of lower extremity  MSK:  LEFT KNEE  Inspection:    normal alignment  Palpation:    Tender about the MCL. Remainder of bony and ligamentous landmarks are nontender.    No effusion is present    Patellofemoral crepitus is Present  Range of Motion:     00 extension to 1350 flexion  Strength:    Quadriceps 5/5, hamstrings 5/5, gastrocsoleus 5/5 and tibialis anterior 5/5    Extensor mechanism intact  Special Tests:    Positive: MCL/valgus stress (0 & 30 deg)    Negative: Patellar grind, patellar apprehension, LCL/varus stress (0 & 30 deg), Lachman's, anterior drawer, posterior drawer, Paula's    Left LE exam:  Foot drop, 1/4 on ankle dorsiflexion, 5-/5 on plantar flexion, 5/5 knee flex/extension    Independent visualization of the below image:  No results found for this or any previous visit (from the past 24 hour(s)).  KNEE STANDING AP BILATERAL [place] BILATERAL LATERAL RIGHT   1/21/2019  2:04 PM      HISTORY: Right knee pain.     COMPARISON: None.                                                                      IMPRESSION: No acute fractures on either side. 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 A small ossicle projected over the  right knee lateral compartment on the frontal view. This could just be  a small osteophyte, but a very small loose body is also possible.  Bilateral leg soft tissue vascular calcifications noted.     LUIS ACEVEDO MD    Patient's conditions were thoroughly discussed during today's visit with greater than 50% of the visit spent  counseling the patient with total time spent face-to-face with the patient being 30 minutes.    Rober Lemons MD, [facility]      Again, thank you for allowing me to participate in the care of your patient.        Sincerely,        Rober Lemons MD

## 2019-02-18 ENCOUNTER — ANTICOAGULATION THERAPY VISIT (OUTPATIENT)
Dept: ANTICOAGULATION | Facility: CLINIC | Age: 74
End: 2019-02-18
Payer: MEDICARE

## 2019-02-18 ENCOUNTER — OFFICE VISIT (OUTPATIENT)
Dept: ORTHOPEDICS | Facility: CLINIC | Age: 74
End: 2019-02-18
Payer: MEDICARE

## 2019-02-18 VITALS
SYSTOLIC BLOOD PRESSURE: 122 MMHG | WEIGHT: 242 LBS | DIASTOLIC BLOOD PRESSURE: 84 MMHG | BODY MASS INDEX: 34.65 KG/M2 | HEIGHT: 70 IN

## 2019-02-18 DIAGNOSIS — I48.91 ATRIAL FIBRILLATION (H): ICD-10-CM

## 2019-02-18 DIAGNOSIS — S83.411A SPRAIN OF MEDIAL COLLATERAL LIGAMENT OF RIGHT KNEE, INITIAL ENCOUNTER: Primary | ICD-10-CM

## 2019-02-18 DIAGNOSIS — Z79.01 LONG TERM CURRENT USE OF ANTICOAGULANT THERAPY: ICD-10-CM

## 2019-02-18 LAB — INR POINT OF CARE: 3 (ref 0.86–1.14)

## 2019-02-18 PROCEDURE — 99214 OFFICE O/P EST MOD 30 MIN: CPT | Performed by: FAMILY MEDICINE

## 2019-02-18 PROCEDURE — 99207 ZZC NO CHARGE NURSE ONLY: CPT

## 2019-02-18 PROCEDURE — 36416 COLLJ CAPILLARY BLOOD SPEC: CPT

## 2019-02-18 PROCEDURE — 85610 PROTHROMBIN TIME: CPT | Mod: QW

## 2019-02-18 ASSESSMENT — MIFFLIN-ST. JEOR: SCORE: 1858.95

## 2019-02-18 NOTE — PROGRESS NOTES
ANTICOAGULATION FOLLOW-UP CLINIC VISIT    Patient Name:  Stef Bhatt  Date:  2/18/2019  Contact Type:  Face to Face    SUBJECTIVE:     Patient Findings     Positives:   No Problem Findings    Comments:   Patient reports no changes in medication. He states he did hurt his knee going up the stairs, did not fall. Patient in a wheelchair today and has been seeing Ortho. Patient states he has not been drinking any alcohol or eating the same amount of greens. Patient reports has taken warfarin as instructed, no missed doses. Patient reports no abnormal bruising or bleeding and no signs or symptoms of a blood clot. INR is therapeutic today. Will plan to continue maintenance dose and recheck INR in 9 weeks. Patient to call [facility] with any changes or concerns. Patient in agreement to plan. Patient verbalized understanding of all instructions, denies questions or concerns at this time.              OBJECTIVE    INR Protime   Date Value Ref Range Status   02/18/2019 3.0 (A) 0.86 - 1.14 Final       ASSESSMENT / PLAN  INR assessment THER    Recheck INR In: 9 WEEKS    INR Location Clinic      Anticoagulation Summary  As of 2/18/2019    INR goal:   2.0-3.0   TTR:   86.4 % (3.8 y)   INR used for dosing:   3.0 (2/18/2019)   Warfarin maintenance plan:   2 mg (2 mg x 1) every Mon, Fri; 4 mg (2 mg x 2) all other days   Full warfarin instructions:   2 mg every Mon, Fri; 4 mg all other days   Weekly warfarin total:   24 mg   No change documented:   Joslyn Rao RN   Plan last modified:   Noelle Harrington RN (4/6/2015)   Next INR check:   4/22/2019   Priority:   INR   Target end date:   Indefinite    Indications    Atrial fibrillation (H) [I48.91]  Long term current use of anticoagulant therapy [Z79.01]             Anticoagulation Episode Summary     INR check location:       Preferred lab:       Send INR reminders to:   [facility]    Comments:   * warfarin 2 mg tabs. Dr Shelley Lennox is pulmonologist. 10/18/18  natasha for 9 week rechecks per Dr. Foster      Anticoagulation Care Providers     Provider Role Specialty Phone number    Marcelino Foster MD [facility] [phone]            See the Encounter Report to view Anticoagulation Flowsheet and Dosing Calendar (Go to Encounters tab in chart review, and find the Anticoagulation Therapy Visit)        Joslyn Rao RN

## 2019-02-18 NOTE — PATIENT INSTRUCTIONS
1) Wear hinged knee brace for 3 weeks  2) Schedule appointment for AFO  3) Schedule physical therapy  4) Follow-up in one month    It was great seeing you guys again!    Rober Lemons

## 2019-02-18 NOTE — LETTER
"    2/18/2019         RE: Stef Bhatt  [address] [facility] [address]        Dear Colleague,    Thank you for referring your patient, Stef Bhatt, to the [facility]. Please see a copy of my visit note below.         ASSESSMENT & PLAN  There are no diagnoses linked to this encounter.Right knee pain: Tenderness to palpation notably over the MCL with x-rays negative for acute fracture with possible intra-articular body.  No locking symptoms noted on history.  He does have some significant pain with valgus stressing.  Etiology likely grade 1/2 MCL sprain.  Will give patient hinged knee brace, rest for 1 week and then encouraged recumbent bike to help build up quad/hamstring strengthening.  The patient to follow-up in 1 month if symptoms are improved.    Left foot drop: Etiology secondary to significant lumbar stenosis with residual foot drop.  He has 1-2 out of 4 strength on the left.  Given concern for fall will place order for AFO and follow-up if symptoms of limited gait persist despite this.    -----    SUBJECTIVE  Stef Bhatt is a/an 73 year old male who is seen in consultation for evaluation of right knee pain. The patient is seen with their wife.    Onset: 1/15/19, 1 week(s) ago. Patient describes injury as going up step twisted knee and felt a sharp increased pain.    Location of Pain: right medial knee   Rating of Pain at worst: 9/10  Rating of Pain Currently: 3/10  Worsened by: twisting, walking   Better with: sitting, rest   Treatments tried: rest/activity avoidance and Tylenol  Associated symptoms: no distal numbness or tingling; denies swelling or warmth  Orthopedic history: YES - Date: neuropathy, and drop foot in left   Relevant surgical history: NO    Interim History - February 18, 2019  Since last visit on 1/21/2019 patient had improved right knee pain.  2/15/19 he was cleaning bathroom and patient felt knee \"give out.\" Patient has been taking " hydrocodone from previous injury. No interim injury.       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     Obesity, unspecified      Pure hypercholesterolemia      Tobacco use disorder     quit in 2005     Trigger finger (acquired)      Type II or unspecified type diabetes mellitus without mention of complication, not stated as uncontrolled      Social History     Socioeconomic History     Marital status:      Spouse name: Not on file     Number of children: Not on file     Years of education: Not on file     Highest education level: Not on file   Social Needs     Financial resource strain: Not on file     Food insecurity - worry: Not on file     Food insecurity - inability: Not on file     Transportation needs - medical: Not on file     Transportation needs - non-medical: Not on file   Occupational History     Not on file   Tobacco Use     Smoking status: Former Smoker     Packs/day: 1.00     Types: Cigarettes, Cigars     Last attempt to quit: 2006     Years since quittin.0     Smokeless tobacco: Never Used     Tobacco comment: cigar once in a blue moon-quit again about 6 weeks ago   Substance and Sexual Activity     Alcohol use: Yes     Comment: occasional     Drug use: No     Sexual activity: Not on file   Other Topics Concern     Parent/sibling w/ CABG, MI or angioplasty before 65F 55M? No   Social History Narrative     Not on file         Patient's past medical, surgical, social, and family histories were reviewed today and no changes are noted.    REVIEW OF SYSTEMS:  10 point ROS is negative other than symptoms noted above in HPI, Past Medical History or as stated below  Constitutional: NEGATIVE for fever, chills, change in weight  Skin: NEGATIVE for worrisome rashes, moles or lesions  GI/: NEGATIVE for bowel or bladder changes  Neuro:  "NEGATIVE for weakness, dizziness or paresthesias    OBJECTIVE:  Ht 1.778 m (5' 10\")   Wt 109.8 kg (242 lb)   BMI 34.72 kg/m      General: healthy, alert and in no distress  HEENT: no scleral icterus or conjunctival erythema  Skin: no suspicious lesions or rash. No jaundice.  CV: no pedal edema  Resp: normal respiratory effort without conversational dyspnea   Psych: normal mood and affect  Gait: normal steady gait with appropriate coordination and balance  Neuro: Normal light sensory exam of lower extremity  MSK:  LEFT KNEE  Inspection:    normal alignment  Palpation:    Tender about the MCL. Remainder of bony and ligamentous landmarks are nontender.    No effusion is present    Patellofemoral crepitus is Present  Range of Motion:     00 extension to 1350 flexion  Strength:    Quadriceps 5/5, hamstrings 5/5, gastrocsoleus 5/5 and tibialis anterior 5/5    Extensor mechanism intact  Special Tests:    Positive: MCL/valgus stress (0 & 30 deg)    Negative: Patellar grind, patellar apprehension, LCL/varus stress (0 & 30 deg), Lachman's, anterior drawer, posterior drawer, Paula's    Left LE exam:  Foot drop, 1/4 on ankle dorsiflexion, 5-/5 on plantar flexion, 5/5 knee flex/extension    Independent visualization of the below image:  No results found for this or any previous visit (from the past 24 hour(s)).  KNEE STANDING AP BILATERAL SUNRISE BILATERAL LATERAL RIGHT   1/21/2019  2:04 PM      HISTORY: Right knee pain.     COMPARISON: None.                                                                      IMPRESSION: No acute fractures on either side. 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 A small ossicle projected over the  right knee lateral compartment on the frontal view. This could just be  a small osteophyte, but a very small loose body is also possible.  Bilateral leg soft tissue vascular calcifications noted.     LUIS ACEVEDO MD    Patient's conditions were thoroughly discussed during today's visit " with greater than 50% of the visit spent counseling the patient with total time spent face-to-face with the patient being 30 minutes.    Rober Lemons MD, [facility]      Again, thank you for allowing me to participate in the care of your patient.        Sincerely,        Rober Lemons MD

## 2019-02-18 NOTE — PROGRESS NOTES
ASSESSMENT & PLAN  Stef was seen today for pain.    Diagnoses and all orders for this visit:    Sprain of medial collateral ligament of right knee, initial encounter  -     PHYSICAL THERAPY REFERRAL (Internal); Future    Patient is a 73-year-old male presenting for recurrent right medial knee pain following an inversion injury occurring several days ago with acute swelling of the medial portion of his knee.  He does have tenderness to palpation of the MCL with pain on valgus stressing as well.  No other ligamentous laxity noted on examination today.  Given history of MCL sprain likely recurrent episode today.  Will have patient continue hinged knee brace, cane/walker to help with ambulation and refer to physical therapy for quad strengthening to help prevent recurrences.    Left foot drop: Etiology secondary to significant lumbar stenosis with residual foot drop.  He has 1-2/4 strength on the left.  An AFO was ordered at previous visit but unfortunately was unable to get obtain due to lack of follow-up.  Will have patient present to the orthotic store for this and follow-up if need be to assist in him getting want to prevent falls.  Patient and significant other understand agree with plan.  -----    SUBJECTIVE  Stef Bhatt is a/an 73 year old male who is seen in consultation for evaluation of right knee pain. The patient is seen with their wife.    Onset: 1/15/19, 1 week(s) ago. Patient describes injury as going up step twisted knee and felt a sharp increased pain.    Location of Pain: right medial knee   Rating of Pain at worst: 9/10  Rating of Pain Currently: 3/10  Worsened by: twisting, walking   Better with: sitting, rest   Treatments tried: rest/activity avoidance and Tylenol  Associated symptoms: no distal numbness or tingling; denies swelling or warmth  Orthopedic history: YES - Date: neuropathy, and drop foot in left   Relevant surgical history: NO    Interim History - February 18, 2019  Since  "last visit on 2019 patient had improved right knee pain.  2/15/19 he was cleaning bathroom and patient felt knee \"give out.\" Patient has been taking hydrocodone from previous injury. No interim injury.       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     Obesity, unspecified      Pure hypercholesterolemia      Tobacco use disorder     quit in 2005     Trigger finger (acquired)      Type II or unspecified type diabetes mellitus without mention of complication, not stated as uncontrolled      Social History     Socioeconomic History     Marital status:      Spouse name: Not on file     Number of children: Not on file     Years of education: Not on file     Highest education level: Not on file   Social Needs     Financial resource strain: Not on file     Food insecurity - worry: Not on file     Food insecurity - inability: Not on file     Transportation needs - medical: Not on file     Transportation needs - non-medical: Not on file   Occupational History     Not on file   Tobacco Use     Smoking status: Former Smoker     Packs/day: 1.00     Types: Cigarettes, Cigars     Last attempt to quit: 2006     Years since quittin.0     Smokeless tobacco: Never Used     Tobacco comment: cigar once in a blue moon-quit again about 6 weeks ago   Substance and Sexual Activity     Alcohol use: Yes     Comment: occasional     Drug use: No     Sexual activity: Not on file   Other Topics Concern     Parent/sibling w/ CABG, MI or angioplasty before 65F 55M? No   Social History Narrative     Not on file         Patient's past medical, surgical, social, and family histories were reviewed today and no changes are noted.    REVIEW OF SYSTEMS:  10 point ROS is negative other than symptoms noted above in HPI, Past Medical History or as stated below  Constitutional: " "NEGATIVE for fever, chills, change in weight  Skin: NEGATIVE for worrisome rashes, moles or lesions  GI/: NEGATIVE for bowel or bladder changes  Neuro: NEGATIVE for weakness, dizziness or paresthesias    OBJECTIVE:  /84   Ht 1.778 m (5' 10\")   Wt 109.8 kg (242 lb)   BMI 34.72 kg/m     General: healthy, alert and in no distress  HEENT: no scleral icterus or conjunctival erythema  Skin: no suspicious lesions or rash. No jaundice.  CV: no pedal edema  Resp: normal respiratory effort without conversational dyspnea   Psych: normal mood and affect  Gait: normal steady gait with appropriate coordination and balance  Neuro: Normal light sensory exam of lower extremity  MSK:  LEFT KNEE  Inspection:    normal alignment  Palpation:    Tender about the MCL. Remainder of bony and ligamentous landmarks are nontender.    No effusion is present    Patellofemoral crepitus is Present  Range of Motion:     00 extension to 1350 flexion  Strength:    Quadriceps 5/5, hamstrings 5/5, gastrocsoleus 5/5 and tibialis anterior 5/5    Extensor mechanism intact  Special Tests:    Positive: MCL/valgus stress (0 & 30 deg)    Negative: Patellar grind, patellar apprehension, LCL/varus stress (0 & 30 deg), Lachman's, anterior drawer, posterior drawer, Paula's    Left LE exam:  Foot drop, 1/4 on ankle dorsiflexion, 5-/5 on plantar flexion, 5/5 knee flex/extension    Independent visualization of the below image:  No results found for this or any previous visit (from the past 24 hour(s)).  KNEE STANDING AP BILATERAL SUNRISE BILATERAL LATERAL RIGHT   1/21/2019  2:04 PM      HISTORY: Right knee pain.     COMPARISON: None.                                                                      IMPRESSION: No acute fractures on either side. 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 A small ossicle projected over the  right knee lateral compartment on the frontal view. This could just be  a small osteophyte, but a very small loose body is " also possible.  Bilateral leg soft tissue vascular calcifications noted.     LUIS ACEVEDO MD    Patient's conditions were thoroughly discussed during today's visit with greater than 50% of the visit spent counseling the patient with total time spent face-to-face with the patient being 30 minutes.    Rober Lemons MD, [facility]

## 2019-02-27 DIAGNOSIS — G62.9 PERIPHERAL POLYNEUROPATHY: ICD-10-CM

## 2019-02-27 RX ORDER — PREGABALIN 50 MG/1
CAPSULE ORAL
Qty: 120 CAPSULE | Refills: 0 | Status: SHIPPED | OUTPATIENT
Start: 2019-02-27 | End: 2019-03-29

## 2019-02-27 NOTE — TELEPHONE ENCOUNTER
"Last Office Visit:  8/29/18  Per 8/29/18   \"(G62.9) Peripheral polyneuropathy  Comment:   Plan: pregabalin (LYRICA) 50 MG capsule        Use this as discussed and the written refill is done today for six months. Recheck then. \"    Patient is requesting 1 month supply of Lyrica stating that he cannot get in for a follow up appointment until next month.    Router to provider:  Medicaton not on [facility] refill protocol.    Carlos Mckee RN    "

## 2019-02-27 NOTE — TELEPHONE ENCOUNTER
Reason for Call:  Medication or medication refill: lyrica-- Patient would like to have just one more month supply do to not able to come in.    Do you use a [facility]?  Name of the pharmacy and phone number for the current request:  [facility] [phone]    Name of the medication requested:   Lyrica  Last Written Prescription Date:  8/29/18  Last Fill Quantity: 120,  # refills: 5   Last office visit: 9/26/2018 with prescribing provider:  Cristian   Future Office Visit:      Can we leave a detailed message on this number? YES    Phone number patient can be reached at: Home number on file [phone] (home)    Best Time: any    Call taken on 2/27/2019 at 1:04 PM by Jaclyn Oneill

## 2019-02-28 ENCOUNTER — HOSPITAL ENCOUNTER (OUTPATIENT)
Dept: PHYSICAL THERAPY | Facility: CLINIC | Age: 74
Setting detail: THERAPIES SERIES
End: 2019-02-28
Attending: FAMILY MEDICINE
Payer: MEDICARE

## 2019-02-28 DIAGNOSIS — S83.411A SPRAIN OF MEDIAL COLLATERAL LIGAMENT OF RIGHT KNEE, INITIAL ENCOUNTER: ICD-10-CM

## 2019-02-28 PROCEDURE — 97112 NEUROMUSCULAR REEDUCATION: CPT | Mod: GP | Performed by: PHYSICAL THERAPIST

## 2019-02-28 PROCEDURE — 97161 PT EVAL LOW COMPLEX 20 MIN: CPT | Mod: GP | Performed by: PHYSICAL THERAPIST

## 2019-02-28 PROCEDURE — 97110 THERAPEUTIC EXERCISES: CPT | Mod: GP | Performed by: PHYSICAL THERAPIST

## 2019-02-28 NOTE — PROGRESS NOTES
" 02/28/19 1300   General Information   Type of Visit Initial OP Ortho PT Evaluation   Start of Care Date 02/28/19   Referring Physician Dr. Lemons   Patient/Family Goals Statement To get the right knee strong enough to walk normally ; to be done with the knee brace March 11th   Orders Evaluate and Treat   Date of Order 02/18/19   Insurance Type Medicare;Blue Cross   Insurance Comments/Visits Authorized Medicare/ BCBS ($2040 remaining in PT/SLP cap; no iontophoresis)   Medical Diagnosis Sprain of medial collateral ligament of right knee, initial encounter   General Information Comments Currenth asthma; copd; diabetes; history of cardiac bypass 2003   Body Part(s)   Body Part(s) Knee   Presentation and Etiology   Pertinent history of current problem (include personal factors and/or comorbidities that impact the POC) Per chart review: patient was cleaning his bathroom when he felt his right knee twist and \"give out.\"  Also of note- left foot drop secondary to lumbar stenosis; does not currently have AFO however one was ordered.  Per patient: he has had chronic left foot drop which has caused him to rely on his right knee more.  He recently injured his right knee climbing stairs.  Was provided with a brace from MD which helps.     Impairments A. Pain;B. Decreased WB tolerance;C. Swelling;D. Decreased ROM;E. Decreased flexibility;F. Decreased strength and endurance;G. Impaired balance;H. Impaired gait;K. Numbness;M. Locking or catching   Functional Limitations perform desired leisure / sports activities;perform activities of daily living   Symptom Location Right medial knee   How/Where did it occur At home   Onset date of current episode/exacerbation 01/15/19   Chronicity New   Pain rating (0-10 point scale) Best (/10);Worst (/10)   Best (/10) 0   Worst (/10) 8   Pain quality A. Sharp;C. Aching   Frequency of pain/symptoms C. With activity   Pain/symptoms are: The same all the time   Pain/symptoms exacerbated by G. " Certain positions   Pain/symptoms eased by A. Sitting;C. Rest;F. Certain positions;J. Braces/supports   Progression of symptoms since onset: Improved   Current / Previous Interventions   Diagnostic Tests: X-ray   X-ray Results Results   X-ray results 1/21/19 XR knee standing: IMPRESSION: No acute fractures on either side. Mild-to-moderate bilateral knee DJD identified. A small ossicle projected over the right knee lateral compartment on the frontal view. This could just be a small osteophyte, but a very small loose body is also possible.  Bilateral leg soft tissue vascular calcifications noted   Prior Level of Function   Prior Level of Function-Mobility Independent  (Standard cane right UE)   Prior Level of Function-ADLs Independent   Current Level of Function   Current Community Support Family/friend caregiver   Patient role/employment history F. Retired   Living environment House/townhome  (1 story; stairs to basement)   Home/community accessibility Usually using no AD when indoors, 1 cane when outdoors.  Since injury 2 canes.     Current equipment-Gait/Locomotion Other  (2x standard canes)   Fall Risk Screen   Fall screen completed by PT   Have you fallen 2 or more times in the past year? No   Have you fallen and had an injury in the past year? No   Timed Up and Go score (seconds) 12 seconds   Is patient a fall risk? Yes;Department fall risk interventions implemented   Fall screen comments Due to left foot drop; weakness of global right knee   Functional Scales   Functional Scales Other   Other Scales  See LEFS   Knee Objective Findings   Side (if bilateral, select both right and left) Right   Gait/Locomotion With 2 standard canes: mild left toe drop with kne and hip flexion compensation   Balance/Proprioception (Single Leg Stance) Tandem stance <5 seconds bob   Palpation Tender to palpation of medial joint line   Right Knee Extension AROM Bob=0 deg   Right Knee Flexion AROM Uhqjs=532 deg   Right Knee Flexion  Strength 4/5   Right Knee Extension Strength 4.5   Right Hip Abduction Strength 3+/5   Planned Therapy Interventions   Planned Therapy Interventions ADL retraining;balance training;joint mobilization;gait training;motor coordination training;manual therapy;neuromuscular re-education;ROM;strengthening;stretching;transfer training   Planned Modality Interventions   Planned Modality Interventions Cryotherapy   Clinical Impression   Criteria for Skilled Therapeutic Interventions Met yes, treatment indicated   PT Diagnosis Right medial knee pain with global weakness and impaired proprioception   Influenced by the following impairments Pain; weakness; impaired proprioception and gait   Functional limitations due to impairments Difficulty walking, performing ADL's, moving from supine <> sit   Clinical Presentation Stable/Uncomplicated   Clinical Presentation Rationale (+) motivation; pain improving with brace wear  (-) time since injury; comorbidities   Clinical Decision Making (Complexity) Low complexity   Therapy Frequency 1 time/week   Predicted Duration of Therapy Intervention (days/wks) 3-4 weeks   Risk & Benefits of therapy have been explained Yes   Patient, Family & other staff in agreement with plan of care Yes   Clinical Impression Comments Stef is a pleasant 72 yo male who presents today with right medial knee pain; he will benefit from skilled PT to restore normalized mobility and pain relief.   Education Assessment   Preferred Learning Style Listening;Demonstration;Pictures/video   Barriers to Learning No barriers   ORTHO GOALS   PT Ortho Eval Goals 1;2;3   Ortho Goal 1   Goal Description Patient will be able to walk with 1 standard cane.   Target Date 03/28/19   Ortho Goal 2   Goal Description Patient will be able to ambulate household distances without the right knee brace.   Target Date 04/25/19   Ortho Goal 3   Goal Description Patient will be independent with his HEP to reduce future occurrence of pain  and disability.   Target Date 03/21/19   Total Evaluation Time   PT Eval, Low Complexity Minutes (89544) 20   Therapy Certification   Certification date from 02/28/19   Certification date to 03/28/19   Medical Diagnosis Sprain of medial collateral ligament of right knee, initial encounter       Maryan Shaw, PT, DPT  Doctor of Physical Therapy #[id number]  [facility]  [phone]  [email or web address]

## 2019-02-28 NOTE — PROGRESS NOTES
[facility]          OUTPATIENT PHYSICAL THERAPY ORTHOPEDIC EVALUATION  PLAN OF TREATMENT FOR OUTPATIENT REHABILITATION  (COMPLETE FOR INITIAL CLAIMS ONLY)  Patient's Last Name, First Name, M.I.  YOB: 1945  Stef Bhatt    Provider s Name:  [facility]   Medical Record No.  [id number]   Start of Care Date:  02/28/19   Onset Date:  01/15/19   Type:     _X__PT   ___OT   ___SLP Medical Diagnosis:  Sprain of medial collateral ligament of right knee, initial encounter     PT Diagnosis:  Right medial knee pain with global weakness and impaired proprioception   Visits from SOC:  1      _________________________________________________________________________________  Plan of Treatment/Functional Goals:  ADL retraining, balance training, joint mobilization, gait training, motor coordination training, manual therapy, neuromuscular re-education, ROM, strengthening, stretching, transfer training     Cryotherapy     Goals     Goal Description: Patient will be able to walk with 1 standard cane.  Target Date: 03/28/19       Goal Description: Patient will be able to ambulate household distances without the right knee brace.  Target Date: 04/25/19       Goal Description: Patient will be independent with his HEP to reduce future occurrence of pain and disability.  Target Date: 03/21/19               Therapy Frequency:  1 time/week  Predicted Duration of Therapy Intervention:  3-4 weeks    Maryan Shaw, PT  Maryan Shaw PT, DPT  Doctor of Physical Therapy #[id number]  [facility]  [phone]  [email or web address]  Thank you for this referral.    I CERTIFY THE NEED FOR THESE SERVICES FURNISHED UNDER        THIS PLAN OF TREATMENT AND WHILE UNDER MY CARE     (Physician co-signature of this document indicates review and certification of the therapy plan).                       Certification Date From:  02/28/19   Certification Date To:   03/28/19    Referring Provider:  Dr. Lemons    Initial Assessment        See Epic Evaluation Start of Care Date: 02/28/19

## 2019-03-06 ENCOUNTER — HOSPITAL ENCOUNTER (OUTPATIENT)
Dept: PHYSICAL THERAPY | Facility: CLINIC | Age: 74
Setting detail: THERAPIES SERIES
End: 2019-03-06
Attending: FAMILY MEDICINE
Payer: MEDICARE

## 2019-03-06 PROCEDURE — 97110 THERAPEUTIC EXERCISES: CPT | Mod: GP | Performed by: PHYSICAL THERAPIST

## 2019-03-13 ENCOUNTER — HOSPITAL ENCOUNTER (OUTPATIENT)
Dept: PHYSICAL THERAPY | Facility: CLINIC | Age: 74
Setting detail: THERAPIES SERIES
End: 2019-03-13
Attending: FAMILY MEDICINE
Payer: MEDICARE

## 2019-03-13 PROCEDURE — 97110 THERAPEUTIC EXERCISES: CPT | Mod: GP | Performed by: PHYSICAL THERAPIST

## 2019-03-14 NOTE — PROGRESS NOTES
Outpatient Physical Therapy Discharge Note     Patient: Stef Bhatt  : 1945    Beginning/End Dates of Reporting Period:  19 to 3/14/2019    Referring Provider: Dr. Lemons    Therapy Diagnosis: Right MCL sprain; global weakness     Client Self Report: Last week patient reports he fell and landed on his low back, this is sore and limiting progress.  Feels the knee is getting better.  Likely this will be the last week with the knee brace.    Objective Measurements:  Objective Measure: Gait  Details: Right UE standard cane; mild left foot drop  Objective Measure: Lower Extremity Functional Scale  Details: 70/80  (12.5% disability; 74% improvement from initial evaluation)       Goals:  Goal Identifier     Goal Description Patient will be able to walk with 1 standard cane.   Target Date 19   Date Met  19   Progress:     Goal Identifier     Goal Description Patient will be able to ambulate household distances without the right knee brace.   Target Date 19   Date Met  19   Progress:     Goal Identifier     Goal Description Patient will be independent with his HEP to reduce future occurrence of pain and disability.   Target Date 19   Date Met  19   Progress:       Progress Toward Goals:   Progress this reporting period: Setf attended 3 PT sessions to address his right knee pain status post a strain.  He made excellent progress returning to normalized gait with a cane; progress was slightly limited by fall resulting in low back strain.  He presents with improved right global knee strength and full ROM.  Stef is independent with his long term HEP.      Plan:  Discharge from therapy; chart open x 4 weeks in case of exacerbation    Discharge:    Reason for Discharge: Patient has met all goals.    Equipment Issued: Theraband    Discharge Plan: Patient to continue home program.      Maryan Shaw, PT, DPT  Doctor of Physical Therapy #[id number]  [facility]  [facility] [place]  [phone]  [email or web address]

## 2019-03-26 DIAGNOSIS — E11.40 TYPE 2 DIABETES MELLITUS WITH DIABETIC NEUROPATHY, WITHOUT LONG-TERM CURRENT USE OF INSULIN (H): ICD-10-CM

## 2019-03-26 LAB
CHOLEST SERPL-MCNC: 118 MG/DL
CREAT SERPL-MCNC: 1.23 MG/DL (ref 0.66–1.25)
CREAT UR-MCNC: 109 MG/DL
ERYTHROCYTE [DISTWIDTH] IN BLOOD BY AUTOMATED COUNT: 14.5 % (ref 10–15)
GFR SERPL CREATININE-BSD FRML MDRD: 58 ML/MIN/{1.73_M2}
HBA1C MFR BLD: 7.3 % (ref 0–5.6)
HCT VFR BLD AUTO: 50.8 % (ref 40–53)
HDLC SERPL-MCNC: 42 MG/DL
HGB BLD-MCNC: 17 G/DL (ref 13.3–17.7)
LDLC SERPL CALC-MCNC: 51 MG/DL
MCH RBC QN AUTO: 32.1 PG (ref 26.5–33)
MCHC RBC AUTO-ENTMCNC: 33.5 G/DL (ref 31.5–36.5)
MCV RBC AUTO: 96 FL (ref 78–100)
MICROALBUMIN UR-MCNC: 824 MG/L
MICROALBUMIN/CREAT UR: 755.96 MG/G CR (ref 0–17)
NONHDLC SERPL-MCNC: 76 MG/DL
PLATELET # BLD AUTO: 150 10E9/L (ref 150–450)
RBC # BLD AUTO: 5.3 10E12/L (ref 4.4–5.9)
TRIGL SERPL-MCNC: 127 MG/DL
TSH SERPL DL<=0.005 MIU/L-ACNC: 1.95 MU/L (ref 0.4–4)
WBC # BLD AUTO: 9.3 10E9/L (ref 4–11)

## 2019-03-26 PROCEDURE — 36415 COLL VENOUS BLD VENIPUNCTURE: CPT | Performed by: FAMILY MEDICINE

## 2019-03-26 PROCEDURE — 85027 COMPLETE CBC AUTOMATED: CPT | Performed by: FAMILY MEDICINE

## 2019-03-26 PROCEDURE — 83036 HEMOGLOBIN GLYCOSYLATED A1C: CPT | Performed by: FAMILY MEDICINE

## 2019-03-26 PROCEDURE — 82565 ASSAY OF CREATININE: CPT | Performed by: FAMILY MEDICINE

## 2019-03-26 PROCEDURE — 80061 LIPID PANEL: CPT | Performed by: FAMILY MEDICINE

## 2019-03-26 PROCEDURE — 84443 ASSAY THYROID STIM HORMONE: CPT | Performed by: FAMILY MEDICINE

## 2019-03-26 PROCEDURE — 82043 UR ALBUMIN QUANTITATIVE: CPT | Performed by: FAMILY MEDICINE

## 2019-03-29 ENCOUNTER — OFFICE VISIT (OUTPATIENT)
Dept: FAMILY MEDICINE | Facility: CLINIC | Age: 74
End: 2019-03-29
Payer: MEDICARE

## 2019-03-29 ENCOUNTER — OFFICE VISIT (OUTPATIENT)
Dept: ORTHOPEDICS | Facility: CLINIC | Age: 74
End: 2019-03-29
Payer: MEDICARE

## 2019-03-29 VITALS — DIASTOLIC BLOOD PRESSURE: 86 MMHG | HEIGHT: 70 IN | SYSTOLIC BLOOD PRESSURE: 132 MMHG | BODY MASS INDEX: 34.72 KG/M2

## 2019-03-29 VITALS
DIASTOLIC BLOOD PRESSURE: 86 MMHG | OXYGEN SATURATION: 94 % | TEMPERATURE: 98 F | HEART RATE: 80 BPM | RESPIRATION RATE: 14 BRPM | SYSTOLIC BLOOD PRESSURE: 132 MMHG

## 2019-03-29 DIAGNOSIS — I48.20 CHRONIC ATRIAL FIBRILLATION (H): ICD-10-CM

## 2019-03-29 DIAGNOSIS — J41.0 SIMPLE CHRONIC BRONCHITIS (H): Primary | ICD-10-CM

## 2019-03-29 DIAGNOSIS — I10 ESSENTIAL HYPERTENSION WITH GOAL BLOOD PRESSURE LESS THAN 130/80: ICD-10-CM

## 2019-03-29 DIAGNOSIS — S83.411A SPRAIN OF MEDIAL COLLATERAL LIGAMENT OF RIGHT KNEE, INITIAL ENCOUNTER: Primary | ICD-10-CM

## 2019-03-29 DIAGNOSIS — E78.5 HYPERLIPIDEMIA WITH TARGET LDL LESS THAN 100: ICD-10-CM

## 2019-03-29 DIAGNOSIS — E11.9 TYPE 2 DIABETES MELLITUS WITHOUT COMPLICATION, WITHOUT LONG-TERM CURRENT USE OF INSULIN (H): ICD-10-CM

## 2019-03-29 DIAGNOSIS — Z79.01 LONG TERM CURRENT USE OF ANTICOAGULANT THERAPY: ICD-10-CM

## 2019-03-29 DIAGNOSIS — G62.9 PERIPHERAL POLYNEUROPATHY: ICD-10-CM

## 2019-03-29 DIAGNOSIS — M21.372 ACQUIRED LEFT FOOT DROP: ICD-10-CM

## 2019-03-29 PROCEDURE — 99213 OFFICE O/P EST LOW 20 MIN: CPT | Performed by: FAMILY MEDICINE

## 2019-03-29 PROCEDURE — 99214 OFFICE O/P EST MOD 30 MIN: CPT | Performed by: FAMILY MEDICINE

## 2019-03-29 RX ORDER — CEFUROXIME AXETIL 500 MG/1
500 TABLET ORAL 2 TIMES DAILY
COMMUNITY
End: 2019-05-23

## 2019-03-29 RX ORDER — WARFARIN SODIUM 2 MG/1
TABLET ORAL
Qty: 150 TABLET | Refills: 3 | Status: SHIPPED | OUTPATIENT
Start: 2019-03-29

## 2019-03-29 RX ORDER — LOSARTAN POTASSIUM 50 MG/1
50 TABLET ORAL DAILY
Qty: 90 TABLET | Refills: 3 | Status: SHIPPED | OUTPATIENT
Start: 2019-03-29

## 2019-03-29 RX ORDER — PREGABALIN 50 MG/1
CAPSULE ORAL
Qty: 120 CAPSULE | Refills: 5 | Status: SHIPPED | OUTPATIENT
Start: 2019-03-29 | End: 2019-09-20

## 2019-03-29 RX ORDER — GLIPIZIDE 10 MG/1
TABLET, FILM COATED, EXTENDED RELEASE ORAL
Qty: 180 TABLET | Refills: 3 | Status: SHIPPED | OUTPATIENT
Start: 2019-03-29

## 2019-03-29 RX ORDER — LEVOFLOXACIN 500 MG/1
500 TABLET, FILM COATED ORAL DAILY
Qty: 10 TABLET | Refills: 5 | Status: SHIPPED | OUTPATIENT
Start: 2019-03-29 | End: 2019-04-08

## 2019-03-29 RX ORDER — METOPROLOL SUCCINATE 50 MG/1
TABLET, EXTENDED RELEASE ORAL
Qty: 270 TABLET | Refills: 3 | Status: SHIPPED | OUTPATIENT
Start: 2019-03-29

## 2019-03-29 RX ORDER — SIMVASTATIN 20 MG
20 TABLET ORAL DAILY
Qty: 90 TABLET | Refills: 3 | Status: SHIPPED | OUTPATIENT
Start: 2019-03-29

## 2019-03-29 NOTE — LETTER
"    3/29/2019         RE: Stef Bhatt  [address] [address]        Dear Colleague,    Thank you for referring your patient, Stef Bhatt, to the [facility]. Please see a copy of my visit note below.         ASSESSMENT & PLAN  Stef was seen today for follow up.    Diagnoses and all orders for this visit:    Sprain of medial collateral ligament of right knee, initial encounter    Acquired left foot drop    MCL Injury Right Knee:  Patient is a 73-year-old male presenting for recurrent right medial knee pain following an inversion injury occurring several days ago with acute swelling of the medial portion of his knee.  Pain near resolved on examination, cleared from PT.  Given this pt cleared, cont activities as tolerated, f/u PRN    Left foot drop: Etiology secondary to significant lumbar stenosis with residual foot drop.  He has 1-2/4 strength on the left.  Ambulation significantly improved with AFO.  Pt to cont advised to get shoes 1/2 size bigger to allot for the AFO.  F/U PRN for this       -----    SUBJECTIVE  Stef Bhatt is a/an 73 year old male who is seen in consultation for evaluation of right knee pain. The patient is seen with their wife.    Onset: 1/15/19, 1 week(s) ago. Patient describes injury as going up step twisted knee and felt a sharp increased pain.    Location of Pain: right medial knee   Rating of Pain at worst: 9/10  Rating of Pain Currently: 3/10  Worsened by: twisting, walking   Better with: sitting, rest   Treatments tried: rest/activity avoidance and Tylenol  Associated symptoms: no distal numbness or tingling; denies swelling or warmth  Orthopedic history: YES - Date: neuropathy, and drop foot in left   Relevant surgical history: NO    Interim History - February 18, 2019  Since last visit on 1/21/2019 patient had improved right knee pain.  2/15/19 he was cleaning bathroom and patient felt knee \"give out.\" Patient has been taking " hydrocodone from previous injury. No interim injury.       Interim History - 2019  Since last visit on 2019 patient has improved.  Patient switched from the knee brace to a knee sleeve.  States that PT went well and that he has been discharged.  Wearing AFO for left foot. No interim injury.   Has sore on 5th toe, PCP evaluated felt it was healing well.  Pt stopped brace 7 days ago.  No pain currently, PT going well completed.  No other concerns at this time.     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     Obesity, unspecified      Pure hypercholesterolemia      Tobacco use disorder     quit in 2005     Trigger finger (acquired)      Type II or unspecified type diabetes mellitus without mention of complication, not stated as uncontrolled      Social History     Socioeconomic History     Marital status:      Spouse name: Not on file     Number of children: Not on file     Years of education: Not on file     Highest education level: Not on file   Social Needs     Financial resource strain: Not on file     Food insecurity - worry: Not on file     Food insecurity - inability: Not on file     Transportation needs - medical: Not on file     Transportation needs - non-medical: Not on file   Occupational History     Not on file   Tobacco Use     Smoking status: Former Smoker     Packs/day: 1.00     Types: Cigarettes, Cigars     Last attempt to quit: 2006     Years since quittin.0     Smokeless tobacco: Never Used     Tobacco comment: cigar once in a blue moon-quit again about 6 weeks ago   Substance and Sexual Activity     Alcohol use: Yes     Comment: occasional     Drug use: No     Sexual activity: Not on file   Other Topics Concern     Parent/sibling w/ CABG, MI or angioplasty before 65F 55M? No   Social History Narrative     Not on  "file     Patient's past medical, surgical, social, and family histories were reviewed today and no changes are noted.    REVIEW OF SYSTEMS:  10 point ROS is negative other than symptoms noted above in HPI, Past Medical History or as stated below  Constitutional: NEGATIVE for fever, chills, change in weight  Skin: NEGATIVE for worrisome rashes, moles or lesions  GI/: NEGATIVE for bowel or bladder changes  Neuro: NEGATIVE for weakness, dizziness or paresthesias    OBJECTIVE:  /86   Ht 1.778 m (5' 10\")   BMI 34.72 kg/m      General: healthy, alert and in no distress  HEENT: no scleral icterus or conjunctival erythema  Skin: no suspicious lesions or rash. No jaundice.  CV: no pedal edema  Resp: normal respiratory effort without conversational dyspnea   Psych: normal mood and affect  Gait: normal steady gait with appropriate coordination and balance  Neuro: Normal light sensory exam of lower extremity  MSK:  RIGHT KNEE  Inspection:     normal alignment,   Palpation:    Mild TTP over MCL. Remainder of bony and ligamentous landmarks are nontender.    No effusion is present    Patellofemoral crepitus is Present  Range of Motion:     00 extension to 1350 flexion  Strength:    Quadriceps 5/5, hamstrings 5/5, gastrocsoleus 5/5 and tibialis anterior 5/5    Extensor mechanism intact  Special Tests:    Positive: MCL/valgus stress (0 & 30 deg)    Negative: Patellar grind, patellar apprehension, LCL/varus stress (0 & 30 deg), Lachman's, anterior drawer, posterior drawer, Paula's    Left LE exam:  Foot drop, 1/4 on ankle dorsiflexion, 5-/5 on plantar flexion, 5/5 knee flex/extension.  Residual left leg slap gait    Independent visualization of the below image:  No results found for this or any previous visit (from the past 24 hour(s)).  KNEE STANDING AP BILATERAL SUNRISE BILATERAL LATERAL RIGHT   1/21/2019  2:04 PM      HISTORY: Right knee pain.     COMPARISON: None.                                                       "                IMPRESSION: No acute fractures on either side. 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 A small ossicle projected over the  right knee lateral compartment on the frontal view. This could just be  a small osteophyte, but a very small loose body is also possible.  Bilateral leg soft tissue vascular calcifications noted.     LUIS ACEVEDO MD    Patient's conditions were thoroughly discussed during today's visit with greater than 50% of the visit spent counseling the patient with total time spent face-to-face with the patient being 30 minutes.    Rober Lemons MD, [facility]      Again, thank you for allowing me to participate in the care of your patient.        Sincerely,        Rober Lemons MD

## 2019-03-29 NOTE — PROGRESS NOTES
SUBJECTIVE:   Stef Bhatt is a 73 year old male who presents to clinic today for the following health issues:    Diabetes Follow-up      Patient is checking blood sugars: he does not check sugars regularly.     Diabetic concerns: other - He would like to have a sore on his left foot looked at.      Symptoms of hypoglycemia (low blood sugar): none     Paresthesias (numbness or burning in feet) or sores: Yes Sore on left foot he is concerned about      Date of last diabetic eye exam: unknown    Diabetes Management Resources    Hyperlipidemia Follow-Up      Rate your low fat/cholesterol diet?: fair    Taking statin?  Yes, no muscle aches from statin    Other lipid medications/supplements?:  None  Lab Results   Component Value Date    CHOL 118 03/26/2019     Lab Results   Component Value Date    HDL 42 03/26/2019     Lab Results   Component Value Date    LDL 51 03/26/2019     Lab Results   Component Value Date    TRIG 127 03/26/2019     Lab Results   Component Value Date    CHOLHDLRATIO 3.1 08/24/2015       Hypertension Follow-up      Outpatient blood pressures are not being checked.    Low Salt Diet: no added salt    BP Readings from Last 2 Encounters:   02/18/19 122/84   01/21/19 130/84     Hemoglobin A1C (%)   Date Value   03/26/2019 7.3 (H)   08/22/2018 6.7 (H)     LDL Cholesterol Calculated (mg/dL)   Date Value   03/26/2019 51   09/12/2017 57       Amount of exercise or physical activity: None    Problems taking medications regularly: No    Medication side effects: none    Diet: regular (no restrictions)      Current Outpatient Medications:      ACCU-CHEK SOFTCLIX LANCETS MISC, use as directed to test blood sugars up to once daily; pt will call to order, Disp: 100 Each, Rfl: 11     albuterol (VENTOLIN HFA) 108 (90 BASE) MCG/ACT inhaler, Inhale 2 puffs into the lungs every 4 hours as needed Pt will call to order, Disp: 1 Inhaler, Rfl: 11     aspirin 81 MG tablet, Take 81 mg by mouth every evening., Disp: 1  tablet, Rfl: 3     blood glucose (ACCU-CHEK COMPACT DRUM) test strip, Use to test blood sugar three times daily or as directed., Disp: 270 strip, Rfl: 1     cefuroxime (CEFTIN) 500 MG tablet, Take 500 mg by mouth 2 times daily, Disp: , Rfl:      diphenhydrAMINE-acetaminophen (TYLENOL PM EXTRA STRENGTH)  MG tablet, Take 1 tablet by mouth nightly as needed for sleep, Disp: , Rfl:      fexofenadine (ALLEGRA) 180 MG tablet, Take 180 mg by mouth every evening , Disp: 30 tablet, Rfl: 1     glipiZIDE (GLIPIZIDE XL) 10 MG 24 hr tablet, Take 2 in the am, Disp: 180 tablet, Rfl: 3     glucose blood VI test strips strip, Use as directed to check sugars twice a day, Disp: 3 Month, Rfl: 3     guaiFENesin (MUCINEX) 600 MG 12 hr tablet, Take 400-600 mg by mouth 2 times daily , Disp: , Rfl:      ipratropium - albuterol 0.5 mg/2.5 mg/3 mL (DUONEB) 0.5-2.5 (3) MG/3ML nebulization, Take 1 vial by nebulization every 6 hours as needed for shortness of breath / dyspnea or wheezing, Disp: , Rfl:      losartan (COZAAR) 50 MG tablet, Take 1 tablet (50 mg) by mouth daily, Disp: 90 tablet, Rfl: 3     metoprolol succinate (TOPROL-XL) 50 MG 24 hr tablet, 100 mg in am, 50 mg in pm, Disp: 270 tablet, Rfl: 3     nitroGLYcerin (NITROSTAT) 0.4 MG sublingual tablet, Place 1 tablet (0.4 mg) under the tongue every 5 minutes as needed for chest pain, Disp: 25 tablet, Rfl: 11     order for DME, Equipment being ordered: neoprene large hinged knee brace, Disp: , Rfl:      predniSONE (DELTASONE) 10 MG tablet, Take 10 mg by mouth daily, Disp: , Rfl:      pregabalin (LYRICA) 50 MG capsule, Take 2 in the am, 2 in the pm., Disp: 120 capsule, Rfl: 0     simvastatin (ZOCOR) 20 MG tablet, Take 1 tablet (20 mg) by mouth daily, Disp: 90 tablet, Rfl: 3     umeclidinium-vilanterol (ANORO ELLIPTA) 62.5-25 MCG/INH oral inhaler, Inhale 1 puff into the lungs daily, Disp: , Rfl:      warfarin (COUMADIN) 2 MG tablet, Take 2 mg Mon & Fri; 4 mg rest of the week or as  directed by Anticoagulation Clinic., Disp: 150 tablet, Rfl: 3     acarbose (PRECOSE) 25 MG tablet, Take 1 tablet (25 mg) by mouth 3 times daily (with meals) (Patient not taking: Reported on 8/29/2018), Disp: 90 tablet, Rfl: 11     ascorbic acid (VITAMIN C) 1000 MG TABS, Take 1,000 mg by mouth daily In winter only, Disp: , Rfl:      ketoconazole (NIZORAL) 2 % shampoo, Apply daily then rinse. Pt will call to order, Disp: 120 mL, Rfl: 11    Patient Active Problem List   Diagnosis     Coronary atherosclerosis     Essential hypertension     Hemorrhage of rectum and anus     Allergic Rhinitis     AK (actinic keratosis)     HYPERLIPIDEMIA LDL GOAL <100     Ventral hernia     Colon polyp     Dysphonia     Senile nuclear sclerosis     Acute myocardial infarction of other specified sites, episode of care unspecified     Health Care Home     Atrial fibrillation (H)     Advance Care Planning     Edge's neuroma     COPD (chronic obstructive pulmonary disease) (H)     Long term current use of anticoagulant therapy     Hyperlipidemia with target LDL less than 100     Morbid obesity (H)     Type 2 diabetes mellitus with diabetic neuropathy (H)     Peripheral polyneuropathy     Chronic bronchitis, unspecified chronic bronchitis type (H)     DJD (degenerative joint disease), cervical     Benign neoplasm of skin of trunk, except scrotum     Decubitus ulcer of buttock, stage 1, unspecified laterality       Blood pressure 132/86, pulse 80, temperature 98  F (36.7  C), temperature source Tympanic, resp. rate 14, SpO2 94 %.    Exam:  GENERAL APPEARANCE: healthy, alert and no distress  GENERAL APPEARANCE: over weight  EYES: EOMI,  PERRL  HENT: ear canals and TM's normal and nose and mouth without ulcers or lesions  NECK: no adenopathy, no asymmetry, masses, or scars and thyroid normal to palpation  RESP: lungs clear to auscultation - no rales, rhonchi or wheezes  CV: regular rates and rhythm, normal S1 S2, no S3 or S4 and no murmur,  click or rub -  MS: he has an orthotic on the left leg and foot.   SKIN: left 5th toe has a callus with a dried scab. No infection.   PSYCH: mentation appears normal and affect normal/bright    normal DP and PT pulses, no trophic changes or ulcerative lesions and normal sensory exam        (E11.9) Type 2 diabetes mellitus without complication, without long-term current use of insulin (H)  Comment:   Plan: glipiZIDE (GLIPIZIDE XL) 10 MG 24 hr tablet        Use the lower carb diet and daily exercise. See the eye doctor annually. Do the daily foot care.   The A1c is 7.3, mildly higher from recent prednisone. The meds are refilled. Recheck in clinic in six months if doing well.     (I10) Essential hypertension with goal blood pressure less than 130/80  Comment:   Plan: losartan (COZAAR) 50 MG tablet, metoprolol         succinate ER (TOPROL-XL) 50 MG 24 hr tablet        Monitor and record the BP at rest. Use the meds and the non drug therapies.   Refill and recheck in six months as above.     (G62.9) Peripheral polyneuropathy  Comment:   Plan: pregabalin (LYRICA) 50 MG capsule        Instructions given on diagnoses and refills done for six months.     (E78.5) Hyperlipidemia with target LDL less than 100  Comment:   Plan: simvastatin (ZOCOR) 20 MG tablet        Use the lower chol diet and exercise daily. Stay on the med and refill and recheck annually.     (I48.2) Chronic atrial fibrillation (H)  Comment:   Plan: warfarin (COUMADIN) 2 MG tablet        You are followed by INR clinic. The med is refilled.     (J41.0) Simple chronic bronchitis (H)    Comment:   Plan: levofloxacin (LEVAQUIN) 500 MG tablet        He has refills with instructions on its use.       Marcelino Foster

## 2019-03-29 NOTE — PROGRESS NOTES
"     ASSESSMENT & PLAN  Stef was seen today for follow up.    Diagnoses and all orders for this visit:    Sprain of medial collateral ligament of right knee, initial encounter    Acquired left foot drop    MCL Injury Right Knee:  Patient is a 73-year-old male presenting for recurrent right medial knee pain following an inversion injury occurring several days ago with acute swelling of the medial portion of his knee.  Pain near resolved on examination, cleared from PT.  Given this pt cleared, cont activities as tolerated, f/u PRN    Left foot drop: Etiology secondary to significant lumbar stenosis with residual foot drop.  He has 1-2/4 strength on the left.  Ambulation significantly improved with AFO.  Pt to cont advised to get shoes 1/2 size bigger to allot for the AFO.  F/U PRN for this       -----    SUBJECTIVE  Stef Bhatt is a/an 73 year old male who is seen in consultation for evaluation of right knee pain. The patient is seen with their wife.    Onset: 1/15/19, 1 week(s) ago. Patient describes injury as going up step twisted knee and felt a sharp increased pain.    Location of Pain: right medial knee   Rating of Pain at worst: 9/10  Rating of Pain Currently: 3/10  Worsened by: twisting, walking   Better with: sitting, rest   Treatments tried: rest/activity avoidance and Tylenol  Associated symptoms: no distal numbness or tingling; denies swelling or warmth  Orthopedic history: YES - Date: neuropathy, and drop foot in left   Relevant surgical history: NO    Interim History - February 18, 2019  Since last visit on 1/21/2019 patient had improved right knee pain.  2/15/19 he was cleaning bathroom and patient felt knee \"give out.\" Patient has been taking hydrocodone from previous injury. No interim injury.       Interim History - March 29, 2019  Since last visit on 2/18/2019 patient has improved.  Patient switched from the knee brace to a knee sleeve.  States that PT went well and that he has been " discharged.  Wearing AFO for left foot. No interim injury.   Has sore on 5th toe, PCP evaluated felt it was healing well.  Pt stopped brace 7 days ago.  No pain currently, PT going well completed.  No other concerns at this time.     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     Obesity, unspecified      Pure hypercholesterolemia      Tobacco use disorder     quit in 2005     Trigger finger (acquired)      Type II or unspecified type diabetes mellitus without mention of complication, not stated as uncontrolled      Social History     Socioeconomic History     Marital status:      Spouse name: Not on file     Number of children: Not on file     Years of education: Not on file     Highest education level: Not on file   Social Needs     Financial resource strain: Not on file     Food insecurity - worry: Not on file     Food insecurity - inability: Not on file     Transportation needs - medical: Not on file     Transportation needs - non-medical: Not on file   Occupational History     Not on file   Tobacco Use     Smoking status: Former Smoker     Packs/day: 1.00     Types: Cigarettes, Cigars     Last attempt to quit: 2006     Years since quittin.0     Smokeless tobacco: Never Used     Tobacco comment: cigar once in a blue moon-quit again about 6 weeks ago   Substance and Sexual Activity     Alcohol use: Yes     Comment: occasional     Drug use: No     Sexual activity: Not on file   Other Topics Concern     Parent/sibling w/ CABG, MI or angioplasty before 65F 55M? No   Social History Narrative     Not on file     Patient's past medical, surgical, social, and family histories were reviewed today and no changes are noted.    REVIEW OF SYSTEMS:  10 point ROS is negative other than symptoms noted above in HPI, Past Medical History or as stated  "below  Constitutional: NEGATIVE for fever, chills, change in weight  Skin: NEGATIVE for worrisome rashes, moles or lesions  GI/: NEGATIVE for bowel or bladder changes  Neuro: NEGATIVE for weakness, dizziness or paresthesias    OBJECTIVE:  /86   Ht 1.778 m (5' 10\")   BMI 34.72 kg/m     General: healthy, alert and in no distress  HEENT: no scleral icterus or conjunctival erythema  Skin: no suspicious lesions or rash. No jaundice.  CV: no pedal edema  Resp: normal respiratory effort without conversational dyspnea   Psych: normal mood and affect  Gait: normal steady gait with appropriate coordination and balance  Neuro: Normal light sensory exam of lower extremity  MSK:  RIGHT KNEE  Inspection:     normal alignment,   Palpation:    Mild TTP over MCL. Remainder of bony and ligamentous landmarks are nontender.    No effusion is present    Patellofemoral crepitus is Present  Range of Motion:     00 extension to 1350 flexion  Strength:    Quadriceps 5/5, hamstrings 5/5, gastrocsoleus 5/5 and tibialis anterior 5/5    Extensor mechanism intact  Special Tests:    Positive: MCL/valgus stress (0 & 30 deg)    Negative: Patellar grind, patellar apprehension, LCL/varus stress (0 & 30 deg), Lachman's, anterior drawer, posterior drawer, Paula's    Left LE exam:  Foot drop, 1/4 on ankle dorsiflexion, 5-/5 on plantar flexion, 5/5 knee flex/extension.  Residual left leg slap gait    Independent visualization of the below image:  No results found for this or any previous visit (from the past 24 hour(s)).  KNEE STANDING AP BILATERAL SUNRISE BILATERAL LATERAL RIGHT   1/21/2019  2:04 PM      HISTORY: Right knee pain.     COMPARISON: None.                                                                      IMPRESSION: No acute fractures on either side. 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 A small ossicle projected over the  right knee lateral compartment on the frontal view. This could just be  a small osteophyte, " but a very small loose body is also possible.  Bilateral leg soft tissue vascular calcifications noted.     LUIS ACEVEDO MD    Patient's conditions were thoroughly discussed during today's visit with greater than 50% of the visit spent counseling the patient with total time spent face-to-face with the patient being 30 minutes.    Rober Lemons MD, [facility]

## 2019-03-29 NOTE — NURSING NOTE
"Initial /86   Pulse 80   Temp 98  F (36.7  C) (Tympanic)   Resp 14   SpO2 94%  Estimated body mass index is 34.72 kg/m  as calculated from the following:    Height as of 2/18/19: 1.778 m (5' 10\").    Weight as of 2/18/19: 109.8 kg (242 lb). .    Samia Daugherty CMA ([facility])  "

## 2019-03-29 NOTE — PATIENT INSTRUCTIONS
(E11.9) Type 2 diabetes mellitus without complication, without long-term current use of insulin (H)  Comment:   Plan: glipiZIDE (GLIPIZIDE XL) 10 MG 24 hr tablet        Use the lower carb diet and daily exercise. See the eye doctor annually. Do the daily foot care.   The A1c is 7.3, mildly higher from recent prednisone. The meds are refilled. Recheck in clinic in six months if doing well.     (I10) Essential hypertension with goal blood pressure less than 130/80  Comment:   Plan: losartan (COZAAR) 50 MG tablet, metoprolol         succinate ER (TOPROL-XL) 50 MG 24 hr tablet        Monitor and record the BP at rest. Use the meds and the non drug therapies.   Refill and recheck in six months as above.     (G62.9) Peripheral polyneuropathy  Comment:   Plan: pregabalin (LYRICA) 50 MG capsule        Instructions given on diagnoses and refills done for six months.     (E78.5) Hyperlipidemia with target LDL less than 100  Comment:   Plan: simvastatin (ZOCOR) 20 MG tablet        Use the lower chol diet and exercise daily. Stay on the med and refill and recheck annually.     (I48.2) Chronic atrial fibrillation (H)  Comment:   Plan: warfarin (COUMADIN) 2 MG tablet        You are followed by INR clinic. The med is refilled.     (J41.0) Simple chronic bronchitis (H)    Comment:   Plan: levofloxacin (LEVAQUIN) 500 MG tablet        He has refills with instructions on its use.

## 2019-04-22 ENCOUNTER — ANTICOAGULATION THERAPY VISIT (OUTPATIENT)
Dept: ANTICOAGULATION | Facility: CLINIC | Age: 74
End: 2019-04-22
Payer: MEDICARE

## 2019-04-22 DIAGNOSIS — Z79.01 LONG TERM CURRENT USE OF ANTICOAGULANT THERAPY: ICD-10-CM

## 2019-04-22 DIAGNOSIS — I48.91 ATRIAL FIBRILLATION (H): ICD-10-CM

## 2019-04-22 LAB — INR POINT OF CARE: 2.5 (ref 0.86–1.14)

## 2019-04-22 PROCEDURE — 99207 ZZC NO CHARGE NURSE ONLY: CPT

## 2019-04-22 PROCEDURE — 85610 PROTHROMBIN TIME: CPT | Mod: QW

## 2019-04-22 PROCEDURE — 36416 COLLJ CAPILLARY BLOOD SPEC: CPT

## 2019-04-22 NOTE — PROGRESS NOTES
ANTICOAGULATION FOLLOW-UP CLINIC VISIT    Patient Name:  Stef Bhatt  Date:  2019  Contact Type:  Face to Face    SUBJECTIVE:     Patient Findings     Positives:   Change in health (Bronchitis/COPD), Change in medications (Finished course of prednisone 3 weeks ago and Levaquin last dose today)    Comments:   Patient reports he was put on Prednisone a few weeks ago and today is his last dose of Levaquin for Bronchitis/COPD. He reports he is feeling better. Patient is aware of the interactions with warfarin being on these medications. Writer requested that in the future he notify [facility] if he has medication changes in case we need to check his INR sooner, and patient verbalized understanding. Patient denies any concerns with bleeding, bruising, or S/S of a blood clot. Will plan to continue maintenance dose and recheck INR in 8.5 weeks (patient requested to return to Thursday appts). Patient to call [facility] with any changes or concerns. Patient in agreement to plan. Patient verbalized understanding of all instructions, denies questions or concerns at this time.              OBJECTIVE    INR Protime   Date Value Ref Range Status   2019 2.5 (A) 0.86 - 1.14 Final       ASSESSMENT / PLAN  INR assessment THER    Recheck INR In: 9 WEEKS    INR Location Clinic      Anticoagulation Summary  As of 2019    INR goal:   2.0-3.0   TTR:   86.9 % (4 y)   INR used for dosin.5 (2019)   Warfarin maintenance plan:   2 mg (2 mg x 1) every Mon, Fri; 4 mg (2 mg x 2) all other days   Full warfarin instructions:   2 mg every Mon, Fri; 4 mg all other days   Weekly warfarin total:   24 mg   No change documented:   Joslyn Rao RN   Plan last modified:   Noelle Harrington RN (2015)   Next INR check:   2019   Priority:   INR   Target end date:   Indefinite    Indications    Atrial fibrillation (H) [I48.91]  Long term current use of anticoagulant therapy [Z79.01]             Anticoagulation Episode  Summary     INR check location:       Preferred lab:       Send INR reminders to:   [facility]    Comments:   * warfarin 2 mg tabs. Dr Shelley Lennox is pulmonologist. 10/18/18 okay for 9 week rechecks per Dr. Foster      Anticoagulation Care Providers     Provider Role Specialty Phone number    Marcelino Foster MD [facility] [phone]            See the Encounter Report to view Anticoagulation Flowsheet and Dosing Calendar (Go to Encounters tab in chart review, and find the Anticoagulation Therapy Visit)      Joslyn Rao RN

## 2019-05-03 ENCOUNTER — TRANSFERRED RECORDS (OUTPATIENT)
Dept: HEALTH INFORMATION MANAGEMENT | Facility: CLINIC | Age: 74
End: 2019-05-03

## 2019-05-07 ENCOUNTER — TELEPHONE (OUTPATIENT)
Dept: WOUND CARE | Facility: CLINIC | Age: 74
End: 2019-05-07

## 2019-05-07 DIAGNOSIS — L97.909 ULCER OF LOWER EXTREMITY, UNSPECIFIED LATERALITY, UNSPECIFIED ULCER STAGE (H): Primary | ICD-10-CM

## 2019-05-07 NOTE — TELEPHONE ENCOUNTER
Call from pt who is requesting a wound care clinic appointment for his toe wound that he has had for a while and does not seem to be improving.  He has a nurse who does foot care for him who has advised him to be seen.  He has not been seen by a provider for this wound.  Previously saw Dr. Irvin in podiatry and vascular for another wound, this was over a year ago and has healed.  He is not interested in seeing podiatry again at this time.      Pt advised that [facility] wound care nurses cannot see pt without a referral from provider.  Pt last saw Dr. Foster in March.  Encounter will be routed to Dr. Foster for order request.  If/when orders for wound care evaluation and treatment received may then schedule pt to be seen.  He states he will call Dr. Foster's office also.    Asya Merchant RN, CWOCN [phone]

## 2019-05-07 NOTE — TELEPHONE ENCOUNTER
Reason for Call: Request for an order or referral:    Order or referral being requested: Patient is calling asking for a referral for wound care. He refused to be seen by Dr. Foster again. He said it is getting worse on his foot. And would like wound care to look at it.     Date needed: at your convenience    Has the patient been seen by the PCP for this problem? YES    Additional comments: adding this to wound care note from him also.    Phone number Patient can be reached at:  Home number on file [phone] (home)    Best Time:  any    Can we leave a detailed message on this number?  YES     Jaclyn Oneill on 5/7/2019 at 3:17 PM

## 2019-05-07 NOTE — TELEPHONE ENCOUNTER
"Patient called back and says \"Dr Foster did unwrap and rewrap the wound that day \" (3/29/19) and he doesn't want to come in.   He is asking for Dr Foster to do a wound care referral.       Eula Gaines RNC    "

## 2019-05-07 NOTE — TELEPHONE ENCOUNTER
Tried to call him for more info , and got voicemail.   Left detailed message per his ok below to do so that I don't see that Dr COTA has seen/ commented on this wound and would suggest he schedule a clinic appt.   Advised him I will route to Dr COTA also, and if there is anything else we recommend we will contact him, otherwise left him our number to call back and schedule an appt with Dr COTA about the wound/ wound care referral.       Eula Gaines RNC

## 2019-05-08 NOTE — TELEPHONE ENCOUNTER
Left detailed message per his ok below that Dr COTA has signed the referral to wound care and he can call them::  To schedule an appointment, please contact the Wound Care/Enterostomal Therapy Nurse at [phone] at [facility].    Eula Gaines RNC

## 2019-05-14 ENCOUNTER — TELEPHONE (OUTPATIENT)
Dept: FAMILY MEDICINE | Facility: CLINIC | Age: 74
End: 2019-05-14

## 2019-05-14 ENCOUNTER — HOSPITAL ENCOUNTER (OUTPATIENT)
Dept: WOUND CARE | Facility: CLINIC | Age: 74
Discharge: HOME | End: 2019-05-14
Attending: SURGERY | Admitting: SURGERY
Payer: MEDICARE

## 2019-05-14 DIAGNOSIS — E11.9 TYPE 2 DIABETES MELLITUS WITHOUT COMPLICATION, WITH LONG-TERM CURRENT USE OF INSULIN (H): ICD-10-CM

## 2019-05-14 DIAGNOSIS — Z79.4 TYPE 2 DIABETES MELLITUS WITHOUT COMPLICATION, WITH LONG-TERM CURRENT USE OF INSULIN (H): ICD-10-CM

## 2019-05-14 DIAGNOSIS — E11.40 TYPE 2 DIABETES MELLITUS WITH DIABETIC NEUROPATHY (H): Primary | ICD-10-CM

## 2019-05-14 PROCEDURE — G0463 HOSPITAL OUTPT CLINIC VISIT: HCPCS

## 2019-05-14 NOTE — DISCHARGE INSTRUCTIONS
"Recommendations -   This appears to be a pressure area, scab/callus is forming as a protective mechanism against some chronic rubbing and pressure.  The hope is that in time this will heal/fall off with intact skin here if you can get the pressure off.  I would not want you to do anything to remove this as I am concerned that your circulation is poor and once this is open bacteria can grow here causing infection.    1. Must keep pressure and rubbing off this toe.  If needed, take an old pair of shoes and cut a hole in them over this toe so that nothing is rubbing on it.  If you think the bed is the issue then need to deal with this.  A very loose shoe that your foot is sliding in can also cause pressure and rubbing here.  Please consider seeing Dr. Foster and getting a prescription for diabetic shoes.  There are a lot of great options in all variety of shoes and most do not look \"orthotic\".  You could go to [facility] here at [facility], [place] for this.    2. Continue to keep covered with a thin bandage.  Could also apply Iodine (betadine) to area when changing the bandaide to help control bacteria and prevent infection and keep dry, (allow to dry before putting the bandaide on).  When this area heals could consider wearing a gel toe cap when wearing shoes to cushion the area, do not do this now as would hold too much moisture and could cause this to get wet and open up into a sore.    3. Need to be seen right away for any signs of infection including redness of toe, swelling, pus coming from under the scab.    Follow-up with wound care nurse as needed if this opens up into an open sore as will require some changes in treatment.    Asya Merchant RN, CWOCN [phone]  "

## 2019-05-14 NOTE — TELEPHONE ENCOUNTER
Reason for Call:  Other orders    Detailed comments: Patient was seen in the wound clinic today and they have suggested he get a pair of diabetic shoes.  If ok please fax order to [facility] at [phone] and call patient.    Phone Number Patient can be reached at: Home number on file [phone] (home)    Best Time: any    Can we leave a detailed message on this number? YES    Call taken on 5/14/2019 at 1:18 PM by Katiana Fierro

## 2019-05-14 NOTE — IP AVS SNAPSHOT
MRN:[id number]                      After Visit Summary   5/14/2019    Stef Bhatt    MRN: [id number]           Visit Information        Provider Department      5/14/2019 10:00 AM Juan Helm Nurse - [facility]           Review of your medicines      UNREVIEWED medicines. Ask your doctor about these medicines       Dose / Directions   acarbose 25 MG tablet  Commonly known as:  PRECOSE  Used for:  Type 2 diabetes mellitus with diabetic neuropathy, without long-term current use of insulin (H)      Dose:  25 mg  Take 1 tablet (25 mg) by mouth 3 times daily (with meals)  Quantity:  90 tablet  Refills:  11     albuterol 108 (90 Base) MCG/ACT inhaler  Commonly known as:  VENTOLIN HFA  Used for:  COPD (chronic obstructive pulmonary disease) (H)      Dose:  2 puff  Inhale 2 puffs into the lungs every 4 hours as needed Pt will call to order  Quantity:  1 Inhaler  Refills:  11     ALLEGRA 180 MG tablet  Generic drug:  fexofenadine      Dose:  180 mg  Take 180 mg by mouth every evening  Quantity:  30 tablet  Refills:  1     aspirin 81 MG tablet  Commonly known as:  ASA      Dose:  81 mg  Take 81 mg by mouth every evening.  Quantity:  1 tablet  Refills:  3     cefuroxime 500 MG tablet  Commonly known as:  CEFTIN      Dose:  500 mg  Take 500 mg by mouth 2 times daily  Refills:  0     glipiZIDE 10 MG 24 hr tablet  Commonly known as:  GLIPIZIDE XL  Used for:  Type 2 diabetes mellitus without complication, without long-term current use of insulin (H)      Take 2 in the am  Quantity:  180 tablet  Refills:  3     ipratropium - albuterol 0.5 mg/2.5 mg/3 mL 0.5-2.5 (3) MG/3ML neb solution  Commonly known as:  DUONEB      Dose:  1 vial  Take 1 vial by nebulization every 6 hours as needed for shortness of breath / dyspnea or wheezing  Refills:  0     ketoconazole 2 % external shampoo  Commonly known as:  NIZORAL  Used for:  Seborrheic dermatitis      Apply daily then rinse. Pt will call to  order  Quantity:  120 mL  Refills:  11     levofloxacin 500 MG tablet  Commonly known as:  LEVAQUIN  Used for:  Simple chronic bronchitis (H)  Ask about: Should I take this medication?      Dose:  500 mg  Take 1 tablet (500 mg) by mouth daily for 10 days  Quantity:  10 tablet  Refills:  5     losartan 50 MG tablet  Commonly known as:  COZAAR  Used for:  Essential hypertension with goal blood pressure less than 130/80      Dose:  50 mg  Take 1 tablet (50 mg) by mouth daily  Quantity:  90 tablet  Refills:  3     metoprolol succinate ER 50 MG 24 hr tablet  Commonly known as:  TOPROL-XL  Used for:  Essential hypertension with goal blood pressure less than 130/80      100 mg in am, 50 mg in pm  Quantity:  270 tablet  Refills:  3     MUCINEX 600 MG 12 hr tablet  Generic drug:  guaiFENesin      Dose:  400-600 mg  Take 400-600 mg by mouth 2 times daily  Refills:  0     nitroGLYcerin 0.4 MG sublingual tablet  Commonly known as:  NITROSTAT  Used for:  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  Quantity:  120 capsule  Refills:  5     simvastatin 20 MG tablet  Commonly known as:  ZOCOR  Used for:  Hyperlipidemia with target LDL less than 100      Dose:  20 mg  Take 1 tablet (20 mg) by mouth daily  Quantity:  90 tablet  Refills:  3     TYLENOL PM EXTRA STRENGTH  MG tablet  Generic drug:  diphenhydrAMINE-acetaminophen      Dose:  1 tablet  Take 1 tablet by mouth nightly as needed for sleep  Refills:  0     umeclidinium-vilanterol 62.5-25  MCG/INH oral inhaler  Commonly known as:  ANORO ELLIPTA      Dose:  1 puff  Inhale 1 puff into the lungs daily  Refills:  0     vitamin C 1000 MG Tabs  Commonly known as:  ASCORBIC ACID      Dose:  1000 mg  Take 1,000 mg by mouth daily In winter only  Refills:  0     warfarin 2 MG tablet  Commonly known as:  COUMADIN  Used for:  Chronic atrial fibrillation (H), Long term current use of anticoagulant therapy      Take as directed by the anticoagulation clinic. If you are unsure how to take this medication, talk to your nurse or doctor.  Original instructions:  Take 2 mg Mon & Fri; 4 mg rest of the week or as directed by Anticoagulation Clinic.  Quantity:  150 tablet  Refills:  3        CONTINUE these medicines which have NOT CHANGED       Dose / Directions   blood glucose monitoring lancets  Used for:  DIABETES MELLITUS TYPE II-UNCOMPL      use as directed to test blood sugars up to once daily; pt will call to order  Quantity:  100 Each  Refills:  11     * blood glucose test strip  Commonly known as:  NO BRAND SPECIFIED  Used for:  DIABETES MELLITUS TYPE II-UNCOMPL      Use as directed to check sugars twice a day  Quantity:  3 Month  Refills:  3     * blood glucose test strip  Commonly known as:  ACCU-CHEK COMPACT DRUM  Used for:  OBESITY NOS, Type 2 diabetes, HbA1C goal < 8% (H)      Use to test blood sugar three times daily or as directed.  Quantity:  270 strip  Refills:  1     order for DME  Used for:  Acute pain of right knee      Equipment being ordered: neoprene large hinged knee brace  Refills:  0         * This list has 2 medication(s) that are the same as other medications prescribed for you. Read the directions carefully, and ask your doctor or other care provider to review them with you.                  Protect others around you: Learn how to safely use, store and throw away your medicines at www.disposemymeds.org.       Follow-ups after your visit       Your next 10 appointments already scheduled    May  21, 2019 12:00 PM CDT  NM MPI WITH LEXISCAN with WYNM2, WY STRESS TEST, W[place] STRESS TEST 2  [facility] ([facility]) [address] [facility] [address]  [phone]   How do I prepare for my exam? (Food and drink instructions)  ***12 hours before your test stop all caffeine. This includes coffee, decaf coffee (has small amounts of caffeine), tea soda, chocolate and some medications (such as Anacin, Excedrin, NoDoz). Stop drinking alcohol, smoking and using tobacco.  ****The morning of the test stop eating 3 hours before the test.    How do I prepare for my exam? (Medication instructions)  You may need to stop some medicines before the test. Follow your doctor s orders.  *2 days before:  Stop taking dipyridamole (Aggrenox, Persantine, Permole)  Stop taking Sildenafil (Viagra), Tadalafil (Cialis) and Vardenafil (Levitra)    **1 day before:  If you take a beta blocker, follow the instructions from your health care team.  Before the test, your doctor may ask you to stop taking your beta blocker. If so: Do not take it the day before your test or the day of your test. Bring it with you to take after the test.    ***12 hours before:  Stop taking Theophylline (Theolair) & Aminophylline    ****Morning of the test:  Do not take Nitrates or wear a Nitro Patch.  If you take insulin or a beta blocker, follow the instructions from your health care provider.    What should I wear: Please wear a loose two-piece outfit. If you will have an exercise test, bring rubber-soled walking shoes.    How long does the exam take:  *This test can take 1-2 days.*  ONE day exam: Allow 3-4 hours for test.  IF TWO day exam: Allow  minutes for test.    What should I bring: Please bring a list of your medicines (including vitamins, minerals and over-the-counter drugs). Leave your valuables at home.    Do I need a : No  is needed.    What do I need to tell my doctor?  Tell your doctor if  you are breastfeeding or if there is any chance you may be pregnant.  When you arrive, please tell us if you:  Have diabetes  Have a pacemaker of ICD (implantable defibrillator).    What should I do after the exam: No restrictions, you may resume normal activities.    What is this test: Your doctor has ordered a nuclear stress test to check how well blood is flowing through your heart. You will either exercise or take a medicine that mimics exercise; we will watch your heart.    Who should I call with questions: If you have any questions, please call the Imaging Department where you will have your exam. Directions, parking instructions, and other information are available on our website, [email or web address].     May 23, 2019  2:30 PM CDT  Return Visit with Lillian Machado MD  [facility] ([facility]) [address] [address]  [phone]      May 29, 2019 11:45 AM CDT  [facility]ee [facility]win [facility]  [facility] [address] [address]  [phone]      Jun 20, 2019  1:00 PM CDT  Anticoagulation Visit with WY ANTI COAG  [facility] [address] [address]  [phone]         Care Instructions       Further instructions from your care team       Recommendations -   This appears to be a pressure area, scab/callus is forming as a protective mechanism against some chronic rubbing and pressure.  The hope is that in time this will heal/fall off with intact skin here if you can get the pressure off.  I would not want you to do anything to remove this as I am concerned that your circulation is poor and once this is open bacteria can grow here causing infection.    1. Must keep pressure and rubbing off this toe.  If needed, take an old pair of shoes and cut a hole in them over this toe so that nothing is rubbing on it.   "If you think the bed is the issue then need to deal with this.  A very loose shoe that your foot is sliding in can also cause pressure and rubbing here.  Please consider seeing Dr. Foster and getting a prescription for diabetic shoes.  There are a lot of great options in all variety of shoes and most do not look \"orthotic\".  You could go to [facility] here at [facility] for this.    2. Continue to keep covered with a thin bandage.  Could also apply Iodine (betadine) to area when changing the bandaide to help control bacteria and prevent infection and keep dry, (allow to dry before putting the bandaide on).  When this area heals could consider wearing a gel toe cap when wearing shoes to cushion the area, do not do this now as would hold too much moisture and could cause this to get wet and open up into a sore.    3. Need to be seen right away for any signs of infection including redness of toe, swelling, pus coming from under the scab.    Follow-up with wound care nurse as needed if this opens up into an open sore as will require some changes in treatment.    Asya Merchant RN, CWOCN [phone]    Additional Information About Your Visit       Care EveryWhere ID    This is your Care EveryWhere ID. This could be used by other organizations to access your [facility] medical records  [id number]        Primary Care Provider Office Phone # Fax #    Marcelino Roque Foster [phone]      Equal Access to Services    [facility] : Hadii janna Raza, waaxda luqadaha, qaybta kaalmada grisel, susanne padilla. So [facility] [phone].    ATENCIÓN: Si habla español, tiene a chau disposición servicios gratuitos de asistencia lingüística. Tarun al [phone].    We comply with applicable federal civil rights laws and [place] laws. We do not discriminate on the basis of race, color, national origin, age, disability, sex, sexual orientation, or gender " identity.           Thank you!    Thank you for choosing [facility] for your care. Our goal is always to provide you with excellent care. Hearing back from our patients is one way we can continue to improve our services. Please take a few minutes to complete the written survey that you may receive in the mail after you visit with us. Thank you!            Medication List      Medications          Morning Afternoon Evening Bedtime As Needed    blood glucose monitoring lancets  INSTRUCTIONS:  use as directed to test blood sugars up to once daily; pt will call to order                     * blood glucose test strip  Also known as:  NO BRAND SPECIFIED  INSTRUCTIONS:  Use as directed to check sugars twice a day                     * blood glucose test strip  Also known as:  ACCU-CHEK COMPACT DRUM  INSTRUCTIONS:  Use to test blood sugar three times daily or as directed.                     order for DME  INSTRUCTIONS:  Equipment being ordered: neoprene large hinged knee brace                        * This list has 2 medication(s) that are the same as other medications prescribed for you. Read the directions carefully, and ask your doctor or other care provider to review them with you.            ASK your doctor about these medications          Morning Afternoon Evening Bedtime As Needed    acarbose 25 MG tablet  Also known as:  PRECOSE  INSTRUCTIONS:  Take 1 tablet (25 mg) by mouth 3 times daily (with meals)                     albuterol 108 (90 Base) MCG/ACT inhaler  Also known as:  VENTOLIN HFA  INSTRUCTIONS:  Inhale 2 puffs into the lungs every 4 hours as needed Pt will call to order                     ALLEGRA 180 MG tablet  INSTRUCTIONS:  Take 180 mg by mouth every evening  Generic drug:  fexofenadine                     aspirin 81 MG tablet  Also known as:  ASA  INSTRUCTIONS:  Take 81 mg by mouth every evening.                     cefuroxime 500 MG tablet  Also known as:  CEFTIN  INSTRUCTIONS:  Take 500 mg by mouth 2  times daily                     glipiZIDE 10 MG 24 hr tablet  Also known as:  GLIPIZIDE XL  INSTRUCTIONS:  Take 2 in the am                     ipratropium - albuterol 0.5 mg/2.5 mg/3 mL 0.5-2.5 (3) MG/3ML neb solution  Also known as:  DUONEB  INSTRUCTIONS:  Take 1 vial by nebulization every 6 hours as needed for shortness of breath / dyspnea or wheezing                     ketoconazole 2 % external shampoo  Also known as:  NIZORAL  INSTRUCTIONS:  Apply daily then rinse. Pt will call to order                     levofloxacin 500 MG tablet  Also known as:  LEVAQUIN  INSTRUCTIONS:  Take 1 tablet (500 mg) by mouth daily for 10 days  Ask about: Should I take this medication?                     losartan 50 MG tablet  Also known as:  COZAAR  INSTRUCTIONS:  Take 1 tablet (50 mg) by mouth daily                     metoprolol succinate ER 50 MG 24 hr tablet  Also known as:  TOPROL-XL  INSTRUCTIONS:  100 mg in am, 50 mg in pm                     MUCINEX 600 MG 12 hr tablet  INSTRUCTIONS:  Take 400-600 mg by mouth 2 times daily  Generic drug:  guaiFENesin                     nitroGLYcerin 0.4 MG sublingual tablet  Also known as:  NITROSTAT  INSTRUCTIONS:  Place 1 tablet (0.4 mg) under the tongue every 5 minutes as needed for chest pain                     predniSONE 10 MG tablet  Also known as:  DELTASONE  INSTRUCTIONS:  Take 10 mg by mouth daily                     pregabalin 50 MG capsule  Also known as:  LYRICA  INSTRUCTIONS:  Take 2 in the am, 2 in the pm.                     simvastatin 20 MG tablet  Also known as:  ZOCOR  INSTRUCTIONS:  Take 1 tablet (20 mg) by mouth daily                     TYLENOL PM EXTRA STRENGTH  MG tablet  INSTRUCTIONS:  Take 1 tablet by mouth nightly as needed for sleep  Generic drug:  diphenhydrAMINE-acetaminophen                     umeclidinium-vilanterol 62.5-25 MCG/INH oral inhaler  Also known as:  ANORO ELLIPTA  INSTRUCTIONS:  Inhale 1 puff into the lungs daily                      vitamin C 1000 MG Tabs  Also known as:  ASCORBIC ACID  INSTRUCTIONS:  Take 1,000 mg by mouth daily In winter only                     warfarin 2 MG tablet  Also known as:  COUMADIN  Take as directed by the anticoagulation clinic. If you are unsure how to take this medication, talk to your nurse or doctor.  Original instructions:  Take 2 mg Mon & Fri; 4 mg rest of the week or as directed by Anticoagulation Clinic.

## 2019-05-14 NOTE — TELEPHONE ENCOUNTER
S-(situation): Patient requesting a DME for diabetic shoes.     B-(background): Last Office visit: 3/29/19 for diabetes and labs up to date.   Had wound care Office visit today, unable to view encounter or notes.    A-(assessment): Patient reports his wound is getting slightly worse and the wound care specialist advised getting a pair of diabetic shoes.     Fax to [facility] in [place] once complete.     Pended DME.  R-(recommendations): Provider please review and advise. Thank you.

## 2019-05-16 NOTE — PROGRESS NOTES
"Reason For Visit: Stef Bhatt, 73 year old male, seen as outpatient to evaluate and treat left foot wound. Referred by Dr. Foster. Patient presents self today.      History: Per pt, he has had area on left 5th toe for about 2 months but thinks it's \"growing\".  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  He has foot drop on the left and wears a brace for this. He does have multiple co-morbidities including DM II, COPD, CAD with history of MI, and is on anticoagulation for A fib.  Pt states he has been told he has poor blood flow, states he saw Dr. Triana and was told that he \"can't help the left side, anything that would be done with be done in an emergency only\".    Per Dr. Triana's note:   \"IMAGING DATA:  He underwent noninvasive imaging.  He has noncompressible vessels with monophasic waveforms on the Doppler and a flat line on the left big toe.      DIAGNOSIS:  Peripheral artery disease, symptomatic.      PLAN:  Mr. Bhatt does have peripheral arterial disease as can be expected in somebody with longstanding diabetes, history of tobacco abuse, and coronary artery disease.  However, it is still asymptomatic.  He does not have any nonhealing wounds or ulcers.  I would not recommend any further intervention at this time.  I have, however, warned him if he develops nonhealing wounds or pain in his feet and legs, then he should come back for evaluation.  With his history of coronary artery disease and tobacco abuse, I would recommend doing a screening ultrasound for abdominal aortic aneurysm.  All questions were answered, and all concerns were addressed. \"      Personal/social history: Lives at home with spouse    Objective:   Physical appearance: adult male, average build, appears stated age  Ambulation: independant  Current treatment plan: keeps covered with bandaide  Last changed: applied 2 days ago    Wound #1 Left 5th toe, lateral " aspect  Stage/tissue depth: unstagable  0.4 cm L x 0.5 cm W x raised 2-3mm   Tunneling: no  Undermining: no  Wound bed type/amount: 100% dark, dry; non fluctuant  Wound Edges: attached  Periwound: intact, no signs of infection  Drainage: no  Odor: no  Pain: no    Dorsalis Pedal Pulse: palpable: minimally doppler: monophasic  Hair growth: scant near knee only  Capillary Refill: <3 seconds  Feet/toes color: pink, foot warm but toes slightly cool  Nails:   L Leg: Edema no    Mobility: independant  Current offloading/footwear: wearing atheletic shoes, these do not seem to be applying pressure to this area however pt normally wears a brace for foot drop and does not have this on today, states brace does not touch this area  Sensation: minimal, neuropathy  HgbA1C: 7.3 Date: 3/26/19  Checks Blood Glucose?:  Every few days, states BG controlled and only takes oral glipizide  Other callousing/areas of concern: flush callus over plantar 3rd met head    Diet:   Smoking: history of    Discussed: etiology of wound likely being pressure, this look like a callus that has had some dermal bleeding with in.  In talking with pt, he notes that the foot board on his bed may be coming into contact with his foot at night and he wakes up with area hurting when sleeping in bed but no issues when he sleeping in the recliner where feet handing off end of foot rest.  Also discussed is concerns with circulation, goal of pressure reduction while keeping area clean and dry - NO debridement or removal of this at this time as do not want to cause an open wound.  If we can assure that pressure if off all the time expect that this will gradually resolve by callus/scab lifting and coming off with hopefully intact skin beneath.    Education: as above, also signs and symptoms of infection which would necessitate urgent follow-up with provider       Assessment:  Left 5th toe probable callus, at great risk for callus ulceration, in pt with PAD, DM II      "  Plan:  Long discussion about pressure/rubbing in area.  He did not want to make changes in bed with foot board he may be hitting, \"but it's an antique\".  He is not interested in being fit with diabetic shoes, \"they are so ugly\".  He thought he may be willing to sleep in the recliner for a while and may have a shoe he can cut a hole into for now.  See below for recommendations which were discussed with pt.  He does have a foot care nurse who trims nails regularly and can help him monitor area.    The following discharge instructions were reviewed with and sent home with the patient:  Recommendations -   This appears to be a pressure area, scab/callus is forming as a protective mechanism against some chronic rubbing and pressure.  The hope is that in time this will heal/fall off with intact skin here if you can get the pressure off.  I would not want you to do anything to remove this as I am concerned that your circulation is poor and once this is open bacteria can grow here causing infection.    1. Must keep pressure and rubbing off this toe.  If needed, take an old pair of shoes and cut a hole in them over this toe so that nothing is rubbing on it.  If you think the bed is the issue then need to deal with this.  A very loose shoe that your foot is sliding in can also cause pressure and rubbing here.  Please consider seeing Dr. Foster and getting a prescription for diabetic shoes.  There are a lot of great options in all variety of shoes and most do not look \"orthotic\".  You could go to [facility] here at [facility] for this.    2. Continue to keep covered with a thin bandage.  Could also apply Iodine (betadine) to area when changing the bandaide to help control bacteria and prevent infection and keep dry, (allow to dry before putting the bandaide on).  When this area heals could consider wearing a gel toe cap when wearing shoes to cushion the area, do not do this now as would hold too much moisture " and could cause this to get wet and open up into a sore.    3. Need to be seen right away for any signs of infection including redness of toe, swelling, pus coming from under the scab.    Follow-up with wound care nurse as needed if this opens up into an open sore as will require some changes in treatment.    The following supplies were sent home with the patient:   None    Return visit: as needed per above    Verbal, written, & demonstrative education provided.  Face to face time (excluding procedure): approximately 45 minutes.  Asya Merchant RN, CWOCN   [phone]

## 2019-05-23 ENCOUNTER — OFFICE VISIT (OUTPATIENT)
Dept: CARDIOLOGY | Facility: CLINIC | Age: 74
End: 2019-05-23
Payer: MEDICARE

## 2019-05-23 VITALS
OXYGEN SATURATION: 90 % | SYSTOLIC BLOOD PRESSURE: 120 MMHG | WEIGHT: 234 LBS | DIASTOLIC BLOOD PRESSURE: 75 MMHG | BODY MASS INDEX: 33.58 KG/M2 | HEART RATE: 84 BPM

## 2019-05-23 DIAGNOSIS — I48.20 CHRONIC ATRIAL FIBRILLATION (H): Primary | ICD-10-CM

## 2019-05-23 DIAGNOSIS — Z95.1 POSTSURGICAL AORTOCORONARY BYPASS STATUS: ICD-10-CM

## 2019-05-23 PROCEDURE — 99214 OFFICE O/P EST MOD 30 MIN: CPT | Performed by: INTERNAL MEDICINE

## 2019-05-23 NOTE — PATIENT INSTRUCTIONS
"[facility] HEART CARE  [facility]~[address]~[place], [place]~[address]  Thank you for your  Heart Care visit today. If you have questions regarding your visit, please contact your cardiology RN's, Kecia Isaac or Azul Viveros, at [phone]. Your provider has recommended the following:  Medication Changes:  None.  Recommendations:  Stress test in August.  Follow-up:  See Dr. Machado for cardiology follow up at [facility]: one year.    To schedule a future appointment, we kindly ask that you call cardiology scheduling at [phone] three months prior to requested revisit date.  [facility] cardiology clinic is staffed with \"Advance Practice Providers\". These are our cardiology Physician Assistants and Nurse Practitioners.   Please call cardiology scheduling if you feel you need clinical evaluation with them at any time for any cardiac reason.   Reminder:  For your safety, we ask that you bring in your current medication(s) or an updated list of your medications with you to EACH office visit. Include the medication name, dose of pill on bottle and how you are taking it. Include over-the-counter medications or supplements. Your provider will review this at each visit and plan your care based on your current information.   ~~~~~~~~~~~~~~~~~~~~~~~~~~~~~~~~~~~~~~~  \"[facility]\" campus telephone numbers for reference:  Cardiology Scheduling~[phone]  Diagnostic Imaging Scheduling~[phone]  Lab Scheduling~[phone]  Anticoagulation Clinic~[phone]  Cardiac Rehabilitation~[phone]  CORE [facility] RN's~[phone] (at [facility])  Cardiology Clinic RN's~[phone] (Azul Viveros, RN & Kecia Isaac, RN)  ~~~~~~~~~~~~~~~~~~~~~~~~~~~~~~~~~~~~~~~~    "

## 2019-05-23 NOTE — PROGRESS NOTES
HPI and Plan:   See dictation    Orders Placed This Encounter   Procedures     NM Lexiscan stress test (nuc card)     Follow-Up with Cardiologist       No orders of the defined types were placed in this encounter.      Medications Discontinued During This Encounter   Medication Reason     cefuroxime (CEFTIN) 500 MG tablet      acarbose (PRECOSE) 25 MG tablet          Encounter Diagnosis   Name Primary?     Postsurgical aortocoronary bypass status        CURRENT MEDICATIONS:  Current Outpatient Medications   Medication Sig Dispense Refill     ACCU-CHEK SOFTCLIX LANCETS MISC use as directed to test blood sugars up to once daily; pt will call to order 100 Each 11     albuterol (VENTOLIN HFA) 108 (90 BASE) MCG/ACT inhaler Inhale 2 puffs into the lungs every 4 hours as needed Pt will call to order 1 Inhaler 11     ascorbic acid (VITAMIN C) 1000 MG TABS Take 1,000 mg by mouth daily In winter only       aspirin 81 MG tablet Take 81 mg by mouth every evening. 1 tablet 3     blood glucose (ACCU-CHEK COMPACT DRUM) test strip Use to test blood sugar three times daily or as directed. 270 strip 1     diphenhydrAMINE-acetaminophen (TYLENOL PM EXTRA STRENGTH)  MG tablet Take 1 tablet by mouth nightly as needed for sleep       fexofenadine (ALLEGRA) 180 MG tablet Take 180 mg by mouth every evening  30 tablet 1     glipiZIDE (GLIPIZIDE XL) 10 MG 24 hr tablet Take 2 in the am 180 tablet 3     glucose blood VI test strips strip Use as directed to check sugars twice a day 3 Month 3     guaiFENesin (MUCINEX) 600 MG 12 hr tablet Take 400-600 mg by mouth 2 times daily        ipratropium - albuterol 0.5 mg/2.5 mg/3 mL (DUONEB) 0.5-2.5 (3) MG/3ML nebulization Take 1 vial by nebulization every 6 hours as needed for shortness of breath / dyspnea or wheezing       ketoconazole (NIZORAL) 2 % shampoo Apply daily then rinse. Pt will call to order 120 mL 11     losartan (COZAAR) 50 MG tablet Take 1 tablet (50 mg) by mouth daily 90 tablet 3      metoprolol succinate ER (TOPROL-XL) 50 MG 24 hr tablet 100 mg in am, 50 mg in pm 270 tablet 3     nitroGLYcerin (NITROSTAT) 0.4 MG sublingual tablet Place 1 tablet (0.4 mg) under the tongue every 5 minutes as needed for chest pain 25 tablet 11     order for DME Equipment being ordered: 1 pair Diabetic shoes 1 Device 0     order for DME Equipment being ordered: neoprene large hinged knee brace       predniSONE (DELTASONE) 10 MG tablet Take 10 mg by mouth daily       pregabalin (LYRICA) 50 MG capsule Take 2 in the am, 2 in the pm. 120 capsule 5     simvastatin (ZOCOR) 20 MG tablet Take 1 tablet (20 mg) by mouth daily 90 tablet 3     umeclidinium-vilanterol (ANORO ELLIPTA) 62.5-25 MCG/INH oral inhaler Inhale 1 puff into the lungs daily       warfarin (COUMADIN) 2 MG tablet Take 2 mg Mon & Fri; 4 mg rest of the week or as directed by Anticoagulation Clinic. 150 tablet 3       ALLERGIES     Allergies   Allergen Reactions     Sulfa Drugs Rash     Rash on chest     Versed Other (See Comments)     agitation     Captopril Fatigue     dizziness     Cimetidine Other (See Comments)     Tagamet - reaction unknown     Lisinopril Other (See Comments) and Fatigue     dizziness       PAST MEDICAL HISTORY:  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     Obesity, unspecified      Pure hypercholesterolemia      Tobacco use disorder     quit in January 2005     Trigger finger (acquired)      Type II or unspecified type diabetes mellitus without mention of complication, not stated as uncontrolled        PAST SURGICAL HISTORY:  Past Surgical History:   Procedure Laterality Date     COLONOSCOPY  1/27/2011    COMBINED COLONOSCOPY, REMOVE TUMOR/POLYP/LESION BY SNARE performed by MARIUSZ ELIZALDE at [facility]     CORONARY ARTERY BYPASS  dec 2006     FLEXIBLE SIGMOIDOSCOPY  7/15/05      PHACOEMULSIFICATION WITH STANDARD INTRAOCULAR LENS IMPLANT  2012    Procedure:PHACOEMULSIFICATION WITH STANDARD INTRAOCULAR LENS IMPLANT; Right Kelman Phacoemulsification with introcular lens implant ; Surgeon:ROBY DORADO; Location:[facility]     PHACOEMULSIFICATION WITH STANDARD INTRAOCULAR LENS IMPLANT  2012    Procedure:PHACOEMULSIFICATION WITH STANDARD INTRAOCULAR LENS IMPLANT; Left Kelman Phacoemulsification with introcular lens implant ; Surgeon:ROBY DORADO; Location:[facility]     SURGICAL HISTORY OF -       chay     SURGICAL HISTORY OF -       choledochocystotomy     SURGICAL HISTORY OF -       lumbar     SURGICAL HISTORY OF -       colonoscopy       FAMILY HISTORY:  Family History   Problem Relation Age of Onset     Diabetes Brother      Breast Cancer Sister      Cancer Sister         lymph nodes     Heart Disease Father        SOCIAL HISTORY:  Social History     Socioeconomic History     Marital status:      Spouse name: None     Number of children: None     Years of education: None     Highest education level: None   Occupational History     None   Social Needs     Financial resource strain: None     Food insecurity:     Worry: None     Inability: None     Transportation needs:     Medical: None     Non-medical: None   Tobacco Use     Smoking status: Former Smoker     Packs/day: 1.00     Types: Cigarettes, Cigars     Last attempt to quit: 2006     Years since quittin.3     Smokeless tobacco: Never Used     Tobacco comment: cigar once in a blue moon-quit again about 6 weeks ago   Substance and Sexual Activity     Alcohol use: Yes     Comment: occasional     Drug use: No     Sexual activity: None   Lifestyle     Physical activity:     Days per week: None     Minutes per session: None     Stress: None   Relationships     Social connections:     Talks on phone: None     Gets together: None     Attends Islam service: None     Active member of club or organization: None      Attends meetings of clubs or organizations: None     Relationship status: None     Intimate partner violence:     Fear of current or ex partner: None     Emotionally abused: None     Physically abused: None     Forced sexual activity: None   Other Topics Concern     Parent/sibling w/ CABG, MI or angioplasty before 65F 55M? No   Social History Narrative     None       Review of Systems:  Skin:  Positive for bruising     Eyes:  Negative      ENT:  Positive for hoarseness    Respiratory:  Positive for dyspnea on exertion;shortness of breath;cough COPD   Cardiovascular:    Positive for;chest pain;palpitations;edema;lower extremity symptoms;fatigue;lightheadedness    Gastroenterology: Negative      Genitourinary:  Negative      Musculoskeletal:  Positive for back pain;joint pain;arthritis;joint swelling;joint stiffness    Neurologic:  Positive for headaches;numbness or tingling of feet neuropathy  Psychiatric:  Negative      Heme/Lymph/Imm:  Negative      Endocrine:  Positive for diabetes      Physical Exam:  Vitals: /90   Pulse 84   Wt 106.1 kg (234 lb)   SpO2 90%   BMI 33.58 kg/m      Constitutional:  cooperative, alert and oriented, well developed, well nourished, in no acute distress        Skin:  warm and dry to the touch          Head:  normocephalic        Eyes:           Lymph:      ENT:           Neck:           Respiratory:       no wheezes, breathsounds clear though with diffuse decrease in BS    Cardiac: normal S1 and S2;no S3 or S4;regular rhythm     no presence of murmur                                                   GI:           Extremities and Muscular Skeletal:                 Neurological:      uses a cane/ mildly dependent gait with left foot drop    Psych:  Alert and Oriented x 3;affect appropriate, oriented to time, person and place          CC  Lillian Machado MD  [address]  [place] [address]

## 2019-05-23 NOTE — LETTER
5/23/2019    Marcelino Foster MD  [address] [facility] [address]    RE: Stef Bhatt       Dear Colleague,    I had the pleasure of seeing Stef Bhatt in the [facility].    HPI and Plan:   See dictation    Orders Placed This Encounter   Procedures     NM Lexiscan stress test (nuc card)     Follow-Up with Cardiologist       No orders of the defined types were placed in this encounter.      Medications Discontinued During This Encounter   Medication Reason     cefuroxime (CEFTIN) 500 MG tablet      acarbose (PRECOSE) 25 MG tablet          Encounter Diagnosis   Name Primary?     Postsurgical aortocoronary bypass status        CURRENT MEDICATIONS:  Current Outpatient Medications   Medication Sig Dispense Refill     ACCU-CHEK SOFTCLIX LANCETS MISC use as directed to test blood sugars up to once daily; pt will call to order 100 Each 11     albuterol (VENTOLIN HFA) 108 (90 BASE) MCG/ACT inhaler Inhale 2 puffs into the lungs every 4 hours as needed Pt will call to order 1 Inhaler 11     ascorbic acid (VITAMIN C) 1000 MG TABS Take 1,000 mg by mouth daily In winter only       aspirin 81 MG tablet Take 81 mg by mouth every evening. 1 tablet 3     blood glucose (ACCU-CHEK COMPACT DRUM) test strip Use to test blood sugar three times daily or as directed. 270 strip 1     diphenhydrAMINE-acetaminophen (TYLENOL PM EXTRA STRENGTH)  MG tablet Take 1 tablet by mouth nightly as needed for sleep       fexofenadine (ALLEGRA) 180 MG tablet Take 180 mg by mouth every evening  30 tablet 1     glipiZIDE (GLIPIZIDE XL) 10 MG 24 hr tablet Take 2 in the am 180 tablet 3     glucose blood VI test strips strip Use as directed to check sugars twice a day 3 Month 3     guaiFENesin (MUCINEX) 600 MG 12 hr tablet Take 400-600 mg by mouth 2 times daily        ipratropium - albuterol 0.5 mg/2.5 mg/3 mL (DUONEB) 0.5-2.5 (3) MG/3ML nebulization Take 1 vial by nebulization every 6 hours as needed for  shortness of breath / dyspnea or wheezing       ketoconazole (NIZORAL) 2 % shampoo Apply daily then rinse. Pt will call to order 120 mL 11     losartan (COZAAR) 50 MG tablet Take 1 tablet (50 mg) by mouth daily 90 tablet 3     metoprolol succinate ER (TOPROL-XL) 50 MG 24 hr tablet 100 mg in am, 50 mg in pm 270 tablet 3     nitroGLYcerin (NITROSTAT) 0.4 MG sublingual tablet Place 1 tablet (0.4 mg) under the tongue every 5 minutes as needed for chest pain 25 tablet 11     order for DME Equipment being ordered: 1 pair Diabetic shoes 1 Device 0     order for DME Equipment being ordered: neoprene large hinged knee brace       predniSONE (DELTASONE) 10 MG tablet Take 10 mg by mouth daily       pregabalin (LYRICA) 50 MG capsule Take 2 in the am, 2 in the pm. 120 capsule 5     simvastatin (ZOCOR) 20 MG tablet Take 1 tablet (20 mg) by mouth daily 90 tablet 3     umeclidinium-vilanterol (ANORO ELLIPTA) 62.5-25 MCG/INH oral inhaler Inhale 1 puff into the lungs daily       warfarin (COUMADIN) 2 MG tablet Take 2 mg Mon & Fri; 4 mg rest of the week or as directed by Anticoagulation Clinic. 150 tablet 3       ALLERGIES     Allergies   Allergen Reactions     Sulfa Drugs Rash     Rash on chest     Versed Other (See Comments)     agitation     Captopril Fatigue     dizziness     Cimetidine Other (See Comments)     Tagamet - reaction unknown     Lisinopril Other (See Comments) and Fatigue     dizziness       PAST MEDICAL HISTORY:  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     Obesity, unspecified      Pure hypercholesterolemia      Tobacco use disorder     quit in January 2005     Trigger finger (acquired)      Type II or unspecified type diabetes mellitus without mention of complication, not stated as uncontrolled        PAST SURGICAL HISTORY:  Past Surgical History:   Procedure  Laterality Date     COLONOSCOPY  2011    COMBINED COLONOSCOPY, REMOVE TUMOR/POLYP/LESION BY SNARE performed by MARIUSZ ELIZALDE at [facility]     CORONARY ARTERY BYPASS  dec 2006     FLEXIBLE SIGMOIDOSCOPY  7/15/05     PHACOEMULSIFICATION WITH STANDARD INTRAOCULAR LENS IMPLANT  2012    Procedure:PHACOEMULSIFICATION WITH STANDARD INTRAOCULAR LENS IMPLANT; Right Kelman Phacoemulsification with introcular lens implant ; Surgeon:ROBY DORADO; Location:[facility]     PHACOEMULSIFICATION WITH STANDARD INTRAOCULAR LENS IMPLANT  2012    Procedure:PHACOEMULSIFICATION WITH STANDARD INTRAOCULAR LENS IMPLANT; Left Kelman Phacoemulsification with introcular lens implant ; Surgeon:ROBY DORADO; Location:[facility]     SURGICAL HISTORY OF -       chay     SURGICAL HISTORY OF -       choledochocystotomy     SURGICAL HISTORY OF -       lumbar     SURGICAL HISTORY OF -       colonoscopy       FAMILY HISTORY:  Family History   Problem Relation Age of Onset     Diabetes Brother      Breast Cancer Sister      Cancer Sister         lymph nodes     Heart Disease Father        SOCIAL HISTORY:  Social History     Socioeconomic History     Marital status:      Spouse name: None     Number of children: None     Years of education: None     Highest education level: None   Occupational History     None   Social Needs     Financial resource strain: None     Food insecurity:     Worry: None     Inability: None     Transportation needs:     Medical: None     Non-medical: None   Tobacco Use     Smoking status: Former Smoker     Packs/day: 1.00     Types: Cigarettes, Cigars     Last attempt to quit: 2006     Years since quittin.3     Smokeless tobacco: Never Used     Tobacco comment: cigar once in a blue moon-quit again about 6 weeks ago   Substance and Sexual Activity     Alcohol use: Yes     Comment: occasional     Drug use: No     Sexual activity: None   Lifestyle     Physical activity:     Days per week: None      Minutes per session: None     Stress: None   Relationships     Social connections:     Talks on phone: None     Gets together: None     Attends Yarsani service: None     Active member of club or organization: None     Attends meetings of clubs or organizations: None     Relationship status: None     Intimate partner violence:     Fear of current or ex partner: None     Emotionally abused: None     Physically abused: None     Forced sexual activity: None   Other Topics Concern     Parent/sibling w/ CABG, MI or angioplasty before 65F 55M? No   Social History Narrative     None       Review of Systems:  Skin:  Positive for bruising     Eyes:  Negative      ENT:  Positive for hoarseness    Respiratory:  Positive for dyspnea on exertion;shortness of breath;cough COPD   Cardiovascular:    Positive for;chest pain;palpitations;edema;lower extremity symptoms;fatigue;lightheadedness    Gastroenterology: Negative      Genitourinary:  Negative      Musculoskeletal:  Positive for back pain;joint pain;arthritis;joint swelling;joint stiffness    Neurologic:  Positive for headaches;numbness or tingling of feet neuropathy  Psychiatric:  Negative      Heme/Lymph/Imm:  Negative      Endocrine:  Positive for diabetes      Physical Exam:  Vitals: /90   Pulse 84   Wt 106.1 kg (234 lb)   SpO2 90%   BMI 33.58 kg/m       Constitutional:  cooperative, alert and oriented, well developed, well nourished, in no acute distress        Skin:  warm and dry to the touch          Head:  normocephalic        Eyes:           Lymph:      ENT:           Neck:           Respiratory:       no wheezes, breathsounds clear though with diffuse decrease in BS    Cardiac: normal S1 and S2;no S3 or S4;regular rhythm     no presence of murmur                                                   GI:           Extremities and Muscular Skeletal:                 Neurological:      uses a cane/ mildly dependent gait with left foot drop    Psych:  Alert and  Oriented x 3;affect appropriate, oriented to time, person and place          CC  Lillian Machado MD  [address]  [place] [address]                    Thank you for allowing me to participate in the care of your patient.      Sincerely,     Lillian Machado MD     [facility]    cc:   Lillian Machado MD  [address]  [place] [address]

## 2019-05-23 NOTE — LETTER
"5/23/2019      Marcelino Foster MD  [address] [facility] [address]      RE: Stef Bhatt       Dear Colleague,    I had the pleasure of seeing Stef Bhatt in the [facility].    Service Date: 05/23/2019      CARDIOLOGY FOLLOWUP       PATIENT HISTORY:  Mr. Stef Bhatt was seen in followup at [facility] in [place], [place].  At 73 years of age, he is followed for a history of coronary artery disease with prior coronary artery bypass graft surgery and a history of atrial fibrillation.      Mr. Bhatt has a history of coronary disease and in 2006, underwent further evaluation which included coronary angiography.  He was diagnosed with multivessel coronary disease and on 12/07/2006, Dr. Alex Joyner placed a left internal mammary artery graft to the LAD, vein graft to the first diagonal branch and a second vein graft to the right PDA.  He did well despite having underlying pulmonary disease and perioperative atrial fibrillation.  He has continued to do well and has overall normal left ventricular systolic performance.      His last stress nuclear study was performed in 2013 at which time there was evidence of only a small-to-moderate and predominantly fixed inferior/inferolateral defect with a \"very small\" nontransmural infarct and minimal keny-infarction ischemia.  A repeat \"screening\" stress study had been planned prior to this appointment but Mr. Bhatt did not want to stop his medications and there was some confusion as to whether that would be required for the ordered Lexiscan stress nuclear study.  He has noted over the years that he \"rarely\" has discomfort shoot across his chest.  He will take a nitroglycerin and his discomfort lasts only a brief time of seconds to a couple minutes.  He states that it occurs very infrequently and he has not experienced any recently.  He remains on good risk factor intervention.  He is on losartan 50 mg daily, " metoprolol XL 50 mg daily, simvastatin 20 mg daily and low-dose aspirin.  He does have atrial fibrillation and so he has remained on anticoagulation with warfarin without complications.      He has significant chronic obstructive pulmonary disease.  He has recently developed an upper respiratory infection, possible lower respiratory infection and so he has been on antibiotics and is just completing a 7-day course of prednisone.  He is also on bronchodilators chronically.  He is otherwise doing well.  He does walk with a slight limp, as he has lower extremity neuropathy.  He has had some impaired wound healing in his left foot.      PHYSICAL EXAMINATION:   GENERAL:  This is a man in no apparent distress.   VITAL SIGNS:  The blood pressure was initially 156/90 mmHg but on recheck in the right arm, sitting, the blood pressure was 120/75 mmHg.  The heart rate was 84 beats per minute and irregular and the respiratory rate was 14-18 per minute.   CHEST:  Revealed a mild and diffuse decrease in breath sounds.   HEART:  On cardiac auscultation, there was an irregular S1 and S2 without extra sounds or even a murmur.      ASSESSMENT AND RECOMMENDATIONS:  Mr. Bhatt is currently doing well.  He has a history of coronary artery disease and prior surgical revascularization.  His last echocardiogram was performed during early 2017 or a little more than 2 years ago at which time the left ventricular function was normal with moderate hypokinesis of the basilar inferior wall.  There was mild aortic dilation of the ascending aorta and mild biatrial enlargement.  He remains on appropriate risk factor intervention as detailed above and his blood pressure is controlled.  He does have diabetes mellitus which is also treated.  A Lexiscan stress nuclear study has been rescheduled, as he is quite pleased to proceed now that he understands he does not need to stop any medications.      With regards to the atrial fibrillation, he is doing well  without evidence of tachycardia, exertional intolerance or need for additional therapy.  He is on AV gisell blockade and full anticoagulation without complications given his elevated CHADS2-VASc score.      Unless the stress study has changed, I will see him back at 1 year.  At that time, I would recommend a repeat echocardiogram.         SARWAT SANTIAGO MD, [facility]             D: 2019   T: 2019   MT: DW      Name:     JAMARCUS ALDRICH   MRN:      [id number]        Account:      [id number]   :      1945           Service Date: 2019      Document: [id number]         Outpatient Encounter Medications as of 2019   Medication Sig Dispense Refill     ACCU-CHEK SOFTCLIX LANCETS MISC use as directed to test blood sugars up to once daily; pt will call to order 100 Each 11     albuterol (VENTOLIN HFA) 108 (90 BASE) MCG/ACT inhaler Inhale 2 puffs into the lungs every 4 hours as needed Pt will call to order 1 Inhaler 11     ascorbic acid (VITAMIN C) 1000 MG TABS Take 1,000 mg by mouth daily In winter only       aspirin 81 MG tablet Take 81 mg by mouth every evening. 1 tablet 3     blood glucose (ACCU-CHEK COMPACT DRUM) test strip Use to test blood sugar three times daily or as directed. 270 strip 1     diphenhydrAMINE-acetaminophen (TYLENOL PM EXTRA STRENGTH)  MG tablet Take 1 tablet by mouth nightly as needed for sleep       fexofenadine (ALLEGRA) 180 MG tablet Take 180 mg by mouth every evening  30 tablet 1     glipiZIDE (GLIPIZIDE XL) 10 MG 24 hr tablet Take 2 in the am 180 tablet 3     glucose blood VI test strips strip Use as directed to check sugars twice a day 3 Month 3     guaiFENesin (MUCINEX) 600 MG 12 hr tablet Take 400-600 mg by mouth 2 times daily        ipratropium - albuterol 0.5 mg/2.5 mg/3 mL (DUONEB) 0.5-2.5 (3) MG/3ML nebulization Take 1 vial by nebulization every 6 hours as needed for shortness of breath / dyspnea or wheezing       ketoconazole (NIZORAL) 2 % shampoo Apply  daily then rinse. Pt will call to order 120 mL 11     losartan (COZAAR) 50 MG tablet Take 1 tablet (50 mg) by mouth daily 90 tablet 3     metoprolol succinate ER (TOPROL-XL) 50 MG 24 hr tablet 100 mg in am, 50 mg in pm 270 tablet 3     nitroGLYcerin (NITROSTAT) 0.4 MG sublingual tablet Place 1 tablet (0.4 mg) under the tongue every 5 minutes as needed for chest pain 25 tablet 11     order for DME Equipment being ordered: 1 pair Diabetic shoes 1 Device 0     order for DME Equipment being ordered: neoprene large hinged knee brace       predniSONE (DELTASONE) 10 MG tablet Take 10 mg by mouth daily       pregabalin (LYRICA) 50 MG capsule Take 2 in the am, 2 in the pm. 120 capsule 5     simvastatin (ZOCOR) 20 MG tablet Take 1 tablet (20 mg) by mouth daily 90 tablet 3     umeclidinium-vilanterol (ANORO ELLIPTA) 62.5-25 MCG/INH oral inhaler Inhale 1 puff into the lungs daily       warfarin (COUMADIN) 2 MG tablet Take 2 mg Mon & Fri; 4 mg rest of the week or as directed by Anticoagulation Clinic. 150 tablet 3     [] levofloxacin (LEVAQUIN) 500 MG tablet Take 1 tablet (500 mg) by mouth daily for 10 days 10 tablet 5     [DISCONTINUED] acarbose (PRECOSE) 25 MG tablet Take 1 tablet (25 mg) by mouth 3 times daily (with meals) 90 tablet 11     [DISCONTINUED] cefuroxime (CEFTIN) 500 MG tablet Take 500 mg by mouth 2 times daily       No facility-administered encounter medications on file as of 2019.        Again, thank you for allowing me to participate in the care of your patient.      Sincerely,    Lillian Machado MD     [facility]

## 2019-05-24 NOTE — PROGRESS NOTES
"Service Date: 05/23/2019      CARDIOLOGY FOLLOWUP       PATIENT HISTORY:  Mr. Stef Bhatt was seen in followup at [facility] in [place], [place].  At 73 years of age, he is followed for a history of coronary artery disease with prior coronary artery bypass graft surgery and a history of atrial fibrillation.      Mr. Bhatt has a history of coronary disease and in 2006, underwent further evaluation which included coronary angiography.  He was diagnosed with multivessel coronary disease and on 12/07/2006, Dr. Alex Joyner placed a left internal mammary artery graft to the LAD, vein graft to the first diagonal branch and a second vein graft to the right PDA.  He did well despite having underlying pulmonary disease and perioperative atrial fibrillation.  He has continued to do well and has overall normal left ventricular systolic performance.      His last stress nuclear study was performed in 2013 at which time there was evidence of only a small-to-moderate and predominantly fixed inferior/inferolateral defect with a \"very small\" nontransmural infarct and minimal keny-infarction ischemia.  A repeat \"screening\" stress study had been planned prior to this appointment but Mr. Bhatt did not want to stop his medications and there was some confusion as to whether that would be required for the ordered Lexiscan stress nuclear study.  He has noted over the years that he \"rarely\" has discomfort shoot across his chest.  He will take a nitroglycerin and his discomfort lasts only a brief time of seconds to a couple minutes.  He states that it occurs very infrequently and he has not experienced any recently.  He remains on good risk factor intervention.  He is on losartan 50 mg daily, metoprolol XL 50 mg daily, simvastatin 20 mg daily and low-dose aspirin.  He does have atrial fibrillation and so he has remained on anticoagulation with warfarin without complications.      He has significant chronic obstructive " pulmonary disease.  He has recently developed an upper respiratory infection, possible lower respiratory infection and so he has been on antibiotics and is just completing a 7-day course of prednisone.  He is also on bronchodilators chronically.  He is otherwise doing well.  He does walk with a slight limp, as he has lower extremity neuropathy.  He has had some impaired wound healing in his left foot.      PHYSICAL EXAMINATION:   GENERAL:  This is a man in no apparent distress.   VITAL SIGNS:  The blood pressure was initially 156/90 mmHg but on recheck in the right arm, sitting, the blood pressure was 120/75 mmHg.  The heart rate was 84 beats per minute and irregular and the respiratory rate was 14-18 per minute.   CHEST:  Revealed a mild and diffuse decrease in breath sounds.   HEART:  On cardiac auscultation, there was an irregular S1 and S2 without extra sounds or even a murmur.      ASSESSMENT AND RECOMMENDATIONS:  Mr. Bhatt is currently doing well.  He has a history of coronary artery disease and prior surgical revascularization.  His last echocardiogram was performed during early 2017 or a little more than 2 years ago at which time the left ventricular function was normal with moderate hypokinesis of the basilar inferior wall.  There was mild aortic dilation of the ascending aorta and mild biatrial enlargement.  He remains on appropriate risk factor intervention as detailed above and his blood pressure is controlled.  He does have diabetes mellitus which is also treated.  A Lexiscan stress nuclear study has been rescheduled, as he is quite pleased to proceed now that he understands he does not need to stop any medications.      With regards to the atrial fibrillation, he is doing well without evidence of tachycardia, exertional intolerance or need for additional therapy.  He is on AV gisell blockade and full anticoagulation without complications given his elevated CHADS2-VASc score.      Unless the stress study  has changed, I will see him back at 1 year.  At that time, I would recommend a repeat echocardiogram.         SARWAT SANTIAGO MD, FACC             D: 2019   T: 2019   MT: POLLY      Name:     JAMARCUS ALDRICH   MRN:      [id number]        Account:      [id number]   :      1945           Service Date: 2019      Document: [id number]

## 2019-05-29 ENCOUNTER — OFFICE VISIT (OUTPATIENT)
Dept: DERMATOLOGY | Facility: CLINIC | Age: 74
End: 2019-05-29
Payer: MEDICARE

## 2019-05-29 VITALS — DIASTOLIC BLOOD PRESSURE: 77 MMHG | HEART RATE: 75 BPM | SYSTOLIC BLOOD PRESSURE: 138 MMHG | OXYGEN SATURATION: 92 %

## 2019-05-29 DIAGNOSIS — D18.00 HEMANGIOMA: Primary | ICD-10-CM

## 2019-05-29 DIAGNOSIS — L71.9 ROSACEA: ICD-10-CM

## 2019-05-29 DIAGNOSIS — L82.1 SEBORRHEIC KERATOSIS: ICD-10-CM

## 2019-05-29 DIAGNOSIS — L81.4 LENTIGO: ICD-10-CM

## 2019-05-29 PROCEDURE — 99203 OFFICE O/P NEW LOW 30 MIN: CPT | Performed by: DERMATOLOGY

## 2019-05-29 NOTE — PROGRESS NOTES
Stef Bhatt , a 73 year old year old male patient, I was asked to see by Dr. Foster for tende rspo ton left arm and spots on face.   , Patient states this has been present for a while.  Patient reports the following symptoms:  tende walter arm .  Patient reports the following previous treatments none.  Patient reports the following modifying factors none.  Associated symptoms: none.  Patient has no other skin complaints today.  Remainder of the HPI, Meds, PMH, Allergies, FH, and SH was reviewed in chart.      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     Obesity, unspecified      Pure hypercholesterolemia      Tobacco use disorder     quit in January 2005     Trigger finger (acquired)      Type II or unspecified type diabetes mellitus without mention of complication, not stated as uncontrolled        Past Surgical History:   Procedure Laterality Date     COLONOSCOPY  1/27/2011    COMBINED COLONOSCOPY, REMOVE TUMOR/POLYP/LESION BY SNARE performed by MARIUSZ ELIZALDE at [facility]     CORONARY ARTERY BYPASS  dec 2006     FLEXIBLE SIGMOIDOSCOPY  7/15/05     PHACOEMULSIFICATION WITH STANDARD INTRAOCULAR LENS IMPLANT  1/19/2012    Procedure:PHACOEMULSIFICATION WITH STANDARD INTRAOCULAR LENS IMPLANT; Right Kelman Phacoemulsification with introcular lens implant ; Surgeon:ROBY DORADO; Location:[facility]     PHACOEMULSIFICATION WITH STANDARD INTRAOCULAR LENS IMPLANT  2/2/2012    Procedure:PHACOEMULSIFICATION WITH STANDARD INTRAOCULAR LENS IMPLANT; Left Kelman Phacoemulsification with introcular lens implant ; Surgeon:ROBY DORADO; Location:[facility]     SURGICAL HISTORY OF -       chay     SURGICAL HISTORY OF -       choledochocystotomy     SURGICAL HISTORY OF -       lumbar     SURGICAL HISTORY OF -   8/94    colonoscopy        Family History   Problem Relation Age of  Onset     Diabetes Brother      Breast Cancer Sister      Cancer Sister         lymph nodes     Heart Disease Father        Social History     Socioeconomic History     Marital status:      Spouse name: Not on file     Number of children: Not on file     Years of education: Not on file     Highest education level: Not on file   Occupational History     Not on file   Social Needs     Financial resource strain: Not on file     Food insecurity:     Worry: Not on file     Inability: Not on file     Transportation needs:     Medical: Not on file     Non-medical: Not on file   Tobacco Use     Smoking status: Former Smoker     Packs/day: 1.00     Types: Cigarettes, Cigars     Last attempt to quit: 2006     Years since quittin.4     Smokeless tobacco: Never Used     Tobacco comment: cigar once in a blue moon-quit again about 6 weeks ago   Substance and Sexual Activity     Alcohol use: Yes     Comment: occasional     Drug use: No     Sexual activity: Not on file   Lifestyle     Physical activity:     Days per week: Not on file     Minutes per session: Not on file     Stress: Not on file   Relationships     Social connections:     Talks on phone: Not on file     Gets together: Not on file     Attends L[employer] service: Not on file     Active member of club or organization: Not on file     Attends meetings of clubs or organizations: Not on file     Relationship status: Not on file     Intimate partner violence:     Fear of current or ex partner: Not on file     Emotionally abused: Not on file     Physically abused: Not on file     Forced sexual activity: Not on file   Other Topics Concern     Parent/sibling w/ CABG, MI or angioplasty before 65F 55M? No   Social History Narrative     Not on file       Outpatient Encounter Medications as of 2019   Medication Sig Dispense Refill     ACCU-CHEK SOFTCLIX LANCETS MISC use as directed to test blood sugars up to once daily; pt will call to order 100 Each 11      albuterol (VENTOLIN HFA) 108 (90 BASE) MCG/ACT inhaler Inhale 2 puffs into the lungs every 4 hours as needed Pt will call to order 1 Inhaler 11     ascorbic acid (VITAMIN C) 1000 MG TABS Take 1,000 mg by mouth daily In winter only       aspirin 81 MG tablet Take 81 mg by mouth every evening. 1 tablet 3     blood glucose (ACCU-CHEK COMPACT DRUM) test strip Use to test blood sugar three times daily or as directed. 270 strip 1     diphenhydrAMINE-acetaminophen (TYLENOL PM EXTRA STRENGTH)  MG tablet Take 1 tablet by mouth nightly as needed for sleep       fexofenadine (ALLEGRA) 180 MG tablet Take 180 mg by mouth every evening  30 tablet 1     glipiZIDE (GLIPIZIDE XL) 10 MG 24 hr tablet Take 2 in the am 180 tablet 3     glucose blood VI test strips strip Use as directed to check sugars twice a day 3 Month 3     guaiFENesin (MUCINEX) 600 MG 12 hr tablet Take 400-600 mg by mouth 2 times daily        ipratropium - albuterol 0.5 mg/2.5 mg/3 mL (DUONEB) 0.5-2.5 (3) MG/3ML nebulization Take 1 vial by nebulization every 6 hours as needed for shortness of breath / dyspnea or wheezing       ketoconazole (NIZORAL) 2 % shampoo Apply daily then rinse. Pt will call to order 120 mL 11     losartan (COZAAR) 50 MG tablet Take 1 tablet (50 mg) by mouth daily 90 tablet 3     metoprolol succinate ER (TOPROL-XL) 50 MG 24 hr tablet 100 mg in am, 50 mg in pm 270 tablet 3     nitroGLYcerin (NITROSTAT) 0.4 MG sublingual tablet Place 1 tablet (0.4 mg) under the tongue every 5 minutes as needed for chest pain 25 tablet 11     order for DME Equipment being ordered: 1 pair Diabetic shoes 1 Device 0     order for DME Equipment being ordered: neoprene large hinged knee brace       predniSONE (DELTASONE) 10 MG tablet Take 10 mg by mouth daily       pregabalin (LYRICA) 50 MG capsule Take 2 in the am, 2 in the pm. 120 capsule 5     simvastatin (ZOCOR) 20 MG tablet Take 1 tablet (20 mg) by mouth daily 90 tablet 3     umeclidinium-vilanterol (ANORO  ELLIPTA) 62.5-25 MCG/INH oral inhaler Inhale 1 puff into the lungs daily       warfarin (COUMADIN) 2 MG tablet Take 2 mg Mon & Fri; 4 mg rest of the week or as directed by Anticoagulation Clinic. 150 tablet 3     [] levofloxacin (LEVAQUIN) 500 MG tablet Take 1 tablet (500 mg) by mouth daily for 10 days 10 tablet 5     No facility-administered encounter medications on file as of 2019.              Review Of Systems  Skin: As above  Eyes: negative  Ears/Nose/Throat: negative  Respiratory: No shortness of breath, dyspnea on exertion, cough, or hemoptysis  Cardiovascular: negative  Gastrointestinal: negative  Genitourinary: negative  Musculoskeletal: negative  Neurologic: negative  Psychiatric: negative  Hematologic/Lymphatic/Immunologic: negative  Endocrine: negative      O:   NAD, WDWN, Alert & Oriented, Mood & Affect wnl, Vitals stable   Here today alone   /77 (BP Location: Right arm, Cuff Size: Adult Large)   Pulse 75   SpO2 92%    General appearance trinity ii   Vitals stable   Alert, oriented and in no acute distress      Following lymph nodes palpated: Occipital, Cervical, Supraclavicular no lad   L arm tender red nodule   Stuck on papules and brown macules on trunk and ext    infalmatory papules and erythema on face  The remainder of expanded problem focused exam was unremarkable; the following areas were examined:  scalp/hair, conjunctiva/lids, face, neck, lips, chest, digits/nails, RUE, LUE.      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 Seborrheic keratosis, letnigo  3. Rosacea  Pathophysiology discussed with pateint   Uv precuations discussed with  patient   Sage discussed with patient he declines  metrocream twice daily   Return to clinic 3 months  BENIGN LESIONS DISCUSSED WITH PATIENT:  I discussed the specifics of tumor, prognosis, and genetics of benign lesions.  I explained that treatment of these lesions would be purely cosmetic and not medically neccessary.  I discussed with patient different removal options including excision, cautery and /or laser.      Nature and genetics of benign skin lesions dicussed with patient.  Signs and Symptoms of skin cancer discussed with patient.  Patient encouraged to perform monthly skin exams.  UV precautions reviewed with patient.  Patient to follow up with Primary Care provider regarding elevated blood pressure.    Skin care regimen reviewed with patient: Eliminate harsh soaps, i.e. Dial, zest, irsih spring; Mild soaps such as Cetaphil or Dove sensitive skin, avoid hot or cold showers, aggressive use of emollients including vanicream, cetaphil or cerave discussed with patient.    Risks of non-melanoma skin cancer discussed with patient   Return to clinic 3 months

## 2019-05-29 NOTE — LETTER
5/29/2019         RE: Stef Bhatt  [id number] [facility] [id number]        Dear Colleague,    Thank you for referring your patient, Stef Bhatt, to the [facility]. Please see a copy of my visit note below.    Stef Bhatt , a 73 year old year old male patient, I was asked to see by Dr. Foster for tende rspo ton left arm and spots on face.   , Patient states this has been present for a while.  Patient reports the following symptoms:  tende walter arm .  Patient reports the following previous treatments none.  Patient reports the following modifying factors none.  Associated symptoms: none.  Patient has no other skin complaints today.  Remainder of the HPI, Meds, PMH, Allergies, FH, and SH was reviewed in chart.      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     Obesity, unspecified      Pure hypercholesterolemia      Tobacco use disorder     quit in January 2005     Trigger finger (acquired)      Type II or unspecified type diabetes mellitus without mention of complication, not stated as uncontrolled        Past Surgical History:   Procedure Laterality Date     COLONOSCOPY  1/27/2011    COMBINED COLONOSCOPY, REMOVE TUMOR/POLYP/LESION BY SNARE performed by MARIUSZ ELIZALDE at [facility]     CORONARY ARTERY BYPASS  dec 2006     FLEXIBLE SIGMOIDOSCOPY  7/15/05     PHACOEMULSIFICATION WITH STANDARD INTRAOCULAR LENS IMPLANT  1/19/2012    Procedure:PHACOEMULSIFICATION WITH STANDARD INTRAOCULAR LENS IMPLANT; Right Kelman Phacoemulsification with introcular lens implant ; Surgeon:ROBY DORADO; Location:[facility]     PHACOEMULSIFICATION WITH STANDARD INTRAOCULAR LENS IMPLANT  2/2/2012    Procedure:PHACOEMULSIFICATION WITH STANDARD INTRAOCULAR LENS IMPLANT; Left Kelman Phacoemulsification with introcular lens implant ; Surgeon:ROBY DORADO;  Location:[facility]     SURGICAL HISTORY OF -       chay     SURGICAL HISTORY OF -       choledochocystotomy     SURGICAL HISTORY OF -       lumbar     SURGICAL HISTORY OF -       colonoscopy        Family History   Problem Relation Age of Onset     Diabetes Brother      Breast Cancer Sister      Cancer Sister         lymph nodes     Heart Disease Father        Social History     Socioeconomic History     Marital status:      Spouse name: Not on file     Number of children: Not on file     Years of education: Not on file     Highest education level: Not on file   Occupational History     Not on file   Social Needs     Financial resource strain: Not on file     Food insecurity:     Worry: Not on file     Inability: Not on file     Transportation needs:     Medical: Not on file     Non-medical: Not on file   Tobacco Use     Smoking status: Former Smoker     Packs/day: 1.00     Types: Cigarettes, Cigars     Last attempt to quit: 2006     Years since quittin.4     Smokeless tobacco: Never Used     Tobacco comment: cigar once in a blue moon-quit again about 6 weeks ago   Substance and Sexual Activity     Alcohol use: Yes     Comment: occasional     Drug use: No     Sexual activity: Not on file   Lifestyle     Physical activity:     Days per week: Not on file     Minutes per session: Not on file     Stress: Not on file   Relationships     Social connections:     Talks on phone: Not on file     Gets together: Not on file     Attends Restoration service: Not on file     Active member of club or organization: Not on file     Attends meetings of clubs or organizations: Not on file     Relationship status: Not on file     Intimate partner violence:     Fear of current or ex partner: Not on file     Emotionally abused: Not on file     Physically abused: Not on file     Forced sexual activity: Not on file   Other Topics Concern     Parent/sibling w/ CABG, MI or angioplasty before 65F 55M? No   Social History  Narrative     Not on file       Outpatient Encounter Medications as of 5/29/2019   Medication Sig Dispense Refill     ACCU-CHEK SOFTCLIX LANCETS MISC use as directed to test blood sugars up to once daily; pt will call to order 100 Each 11     albuterol (VENTOLIN HFA) 108 (90 BASE) MCG/ACT inhaler Inhale 2 puffs into the lungs every 4 hours as needed Pt will call to order 1 Inhaler 11     ascorbic acid (VITAMIN C) 1000 MG TABS Take 1,000 mg by mouth daily In winter only       aspirin 81 MG tablet Take 81 mg by mouth every evening. 1 tablet 3     blood glucose (ACCU-CHEK COMPACT DRUM) test strip Use to test blood sugar three times daily or as directed. 270 strip 1     diphenhydrAMINE-acetaminophen (TYLENOL PM EXTRA STRENGTH)  MG tablet Take 1 tablet by mouth nightly as needed for sleep       fexofenadine (ALLEGRA) 180 MG tablet Take 180 mg by mouth every evening  30 tablet 1     glipiZIDE (GLIPIZIDE XL) 10 MG 24 hr tablet Take 2 in the am 180 tablet 3     glucose blood VI test strips strip Use as directed to check sugars twice a day 3 Month 3     guaiFENesin (MUCINEX) 600 MG 12 hr tablet Take 400-600 mg by mouth 2 times daily        ipratropium - albuterol 0.5 mg/2.5 mg/3 mL (DUONEB) 0.5-2.5 (3) MG/3ML nebulization Take 1 vial by nebulization every 6 hours as needed for shortness of breath / dyspnea or wheezing       ketoconazole (NIZORAL) 2 % shampoo Apply daily then rinse. Pt will call to order 120 mL 11     losartan (COZAAR) 50 MG tablet Take 1 tablet (50 mg) by mouth daily 90 tablet 3     metoprolol succinate ER (TOPROL-XL) 50 MG 24 hr tablet 100 mg in am, 50 mg in pm 270 tablet 3     nitroGLYcerin (NITROSTAT) 0.4 MG sublingual tablet Place 1 tablet (0.4 mg) under the tongue every 5 minutes as needed for chest pain 25 tablet 11     order for DME Equipment being ordered: 1 pair Diabetic shoes 1 Device 0     order for DME Equipment being ordered: neoprene large hinged knee brace       predniSONE (DELTASONE) 10  MG tablet Take 10 mg by mouth daily       pregabalin (LYRICA) 50 MG capsule Take 2 in the am, 2 in the pm. 120 capsule 5     simvastatin (ZOCOR) 20 MG tablet Take 1 tablet (20 mg) by mouth daily 90 tablet 3     umeclidinium-vilanterol (ANORO ELLIPTA) 62.5-25 MCG/INH oral inhaler Inhale 1 puff into the lungs daily       warfarin (COUMADIN) 2 MG tablet Take 2 mg Mon & Fri; 4 mg rest of the week or as directed by Anticoagulation Clinic. 150 tablet 3     [] levofloxacin (LEVAQUIN) 500 MG tablet Take 1 tablet (500 mg) by mouth daily for 10 days 10 tablet 5     No facility-administered encounter medications on file as of 2019.              Review Of Systems  Skin: As above  Eyes: negative  Ears/Nose/Throat: negative  Respiratory: No shortness of breath, dyspnea on exertion, cough, or hemoptysis  Cardiovascular: negative  Gastrointestinal: negative  Genitourinary: negative  Musculoskeletal: negative  Neurologic: negative  Psychiatric: negative  Hematologic/Lymphatic/Immunologic: negative  Endocrine: negative      O:   NAD, WDWN, Alert & Oriented, Mood & Affect wnl, Vitals stable   Here today alone   /77 (BP Location: Right arm, Cuff Size: Adult Large)   Pulse 75   SpO2 92%    General appearance trinity ii   Vitals stable   Alert, oriented and in no acute distress      Following lymph nodes palpated: Occipital, Cervical, Supraclavicular no lad   L arm tender red nodule   Stuck on papules and brown macules on trunk and ext    infalmatory papules and erythema on face  The remainder of expanded problem focused exam was unremarkable; the following areas were examined:  scalp/hair, conjunctiva/lids, face, neck, lips, chest, digits/nails, RUE, LUE.      Eyes: Conjunctivae/lids:Normal     ENT: Lips, buccal mucosa, tongue: normal    MSK:Normal    Cardiovascular: peripheral edema none    Pulm: Breathing Normal    Lymph Nodes: No Head and Neck Lymphadenopathy     Neuro/Psych: Orientation:Normal;  Mood/Affect:Normal      A/P:  1. 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 Seborrheic keratosis, letnigo  3. Rosacea  Pathophysiology discussed with pateint   Uv precuations discussed with patient   Doxy discussed with patient he declines  metrocream twice daily   Return to clinic 3 months  BENIGN LESIONS DISCUSSED WITH PATIENT:  I discussed the specifics of tumor, prognosis, and genetics of benign lesions.  I explained that treatment of these lesions would be purely cosmetic and not medically neccessary.  I discussed with patient different removal options including excision, cautery and /or laser.      Nature and genetics of benign skin lesions dicussed with patient.  Signs and Symptoms of skin cancer discussed with patient.  Patient encouraged to perform monthly skin exams.  UV precautions reviewed with patient.  Patient to follow up with Primary Care provider regarding elevated blood pressure.    Skin care regimen reviewed with patient: Eliminate harsh soaps, i.e. Dial, zest, irsih spring; Mild soaps such as Cetaphil or Dove sensitive skin, avoid hot or cold showers, aggressive use of emollients including vanicream, cetaphil or cerave discussed with patient.    Risks of non-melanoma skin cancer discussed with patient   Return to clinic 3 months      Again, thank you for allowing me to participate in the care of your patient.        Sincerely,        Tee Erwin MD

## 2019-05-30 ENCOUNTER — MEDICAL CORRESPONDENCE (OUTPATIENT)
Dept: HEALTH INFORMATION MANAGEMENT | Facility: CLINIC | Age: 74
End: 2019-05-30

## 2019-06-06 ENCOUNTER — TELEPHONE (OUTPATIENT)
Dept: FAMILY MEDICINE | Facility: CLINIC | Age: 74
End: 2019-06-06

## 2019-06-17 PROBLEM — L89.301 PRESSURE INJURY OF BUTTOCK, STAGE 1: Status: ACTIVE | Noted: 2017-08-03

## 2019-06-20 ENCOUNTER — ANTICOAGULATION THERAPY VISIT (OUTPATIENT)
Dept: ANTICOAGULATION | Facility: CLINIC | Age: 74
End: 2019-06-20
Payer: MEDICARE

## 2019-06-20 DIAGNOSIS — Z79.01 LONG TERM CURRENT USE OF ANTICOAGULANT THERAPY: ICD-10-CM

## 2019-06-20 DIAGNOSIS — I48.20 CHRONIC ATRIAL FIBRILLATION (H): ICD-10-CM

## 2019-06-20 LAB — INR POINT OF CARE: 2.6 (ref 0.86–1.14)

## 2019-06-20 PROCEDURE — 36416 COLLJ CAPILLARY BLOOD SPEC: CPT

## 2019-06-20 PROCEDURE — 85610 PROTHROMBIN TIME: CPT | Mod: QW

## 2019-06-20 PROCEDURE — 99207 ZZC NO CHARGE NURSE ONLY: CPT

## 2019-06-20 NOTE — PROGRESS NOTES
ANTICOAGULATION FOLLOW-UP CLINIC VISIT    Patient Name:  Stef Bhatt  Date:  2019  Contact Type:  Face to Face    SUBJECTIVE:  Patient Findings     Positives:   Change in medications (Levaquin), Change in diet/appetite (Pt is eating less bread and watching his weight.)    Comments:   No changes in activity or diet noted. No bleeding or increased bruising noted. Took warfarin as prescribed.  Patient is to continue maintenance warfarin plan, and check INR in 9 weeks.  Patient verbalizes understanding and agrees to plan. No further questions or concerns.        Clinical Outcomes     Negatives:   Major bleeding event, Thromboembolic event, Anticoagulation-related hospital admission, Anticoagulation-related ED visit, Anticoagulation-related fatality    Comments:   No changes in activity or diet noted. No bleeding or increased bruising noted. Took warfarin as prescribed.  Patient is to continue maintenance warfarin plan, and check INR in 9 weeks.  Patient verbalizes understanding and agrees to plan. No further questions or concerns.           OBJECTIVE    INR Protime   Date Value Ref Range Status   2019 2.6 (A) 0.86 - 1.14 Final       ASSESSMENT / PLAN  INR assessment THER    Recheck INR In: 9 WEEKS    INR Location Clinic      Anticoagulation Summary  As of 2019    INR goal:   2.0-3.0   TTR:   87.4 % (4.2 y)   INR used for dosin.6 (2019)   Warfarin maintenance plan:   2 mg (2 mg x 1) every Mon, Fri; 4 mg (2 mg x 2) all other days   Full warfarin instructions:   2 mg every Mon, Fri; 4 mg all other days   Weekly warfarin total:   24 mg   No change documented:   Gloria Arboleda RN   Plan last modified:   Noelle Harrington RN (2015)   Next INR check:   2019   Priority:   INR   Target end date:   Indefinite    Indications    Atrial fibrillation (H) [I48.91]  Long term current use of anticoagulant therapy [Z79.01]             Anticoagulation Episode Summary     INR check location:        Preferred lab:       Send INR reminders to:   [facility]    Comments:   * warfarin 2 mg tabs. Dr Shelley Lennox is pulmonologist. 10/18/18 okay for 9 week rechecks per Dr. Foster      Anticoagulation Care Providers     Provider Role Specialty Phone number    Marcelino Foster MD [facility] [phone]            See the Encounter Report to view Anticoagulation Flowsheet and Dosing Calendar (Go to Encounters tab in chart review, and find the Anticoagulation Therapy Visit)        Gloria Arboleda RN

## 2019-08-22 ENCOUNTER — ANTICOAGULATION THERAPY VISIT (OUTPATIENT)
Dept: ANTICOAGULATION | Facility: CLINIC | Age: 74
End: 2019-08-22
Payer: MEDICARE

## 2019-08-22 DIAGNOSIS — Z79.01 LONG TERM CURRENT USE OF ANTICOAGULANT THERAPY: ICD-10-CM

## 2019-08-22 DIAGNOSIS — I48.20 CHRONIC ATRIAL FIBRILLATION (H): ICD-10-CM

## 2019-08-22 LAB — INR POINT OF CARE: 2.5 (ref 0.86–1.14)

## 2019-08-22 PROCEDURE — 36416 COLLJ CAPILLARY BLOOD SPEC: CPT

## 2019-08-22 PROCEDURE — 85610 PROTHROMBIN TIME: CPT | Mod: QW

## 2019-08-22 PROCEDURE — 99207 ZZC NO CHARGE NURSE ONLY: CPT

## 2019-08-22 NOTE — PROGRESS NOTES
ANTICOAGULATION FOLLOW-UP CLINIC VISIT    Patient Name:  Stef Bhatt  Date:  2019  Contact Type:  Face to Face    SUBJECTIVE:  Patient Findings     Positives:   Other complaints (Increase in stress )    Comments:   No changes in medications, activity, or diet noted. No concerns with clotting, bleeding, or increased bruising noted. Took warfarin as prescribed. Pt does have a hemorrhoid so he has slight bleeding - this is not abnormal.  Patient is to continue maintenance warfarin plan, and check INR in 9 weeks.  Patient verbalizes understanding and agrees to plan. No further questions or concerns.        Clinical Outcomes     Negatives:   Major bleeding event, Thromboembolic event, Anticoagulation-related hospital admission, Anticoagulation-related ED visit, Anticoagulation-related fatality    Comments:   No changes in medications, activity, or diet noted. No concerns with clotting, bleeding, or increased bruising noted. Took warfarin as prescribed. Pt does have a hemorrhoid so he has slight bleeding - this is not abnormal.  Patient is to continue maintenance warfarin plan, and check INR in 9 weeks.  Patient verbalizes understanding and agrees to plan. No further questions or concerns.           OBJECTIVE    INR Protime   Date Value Ref Range Status   2019 2.5 (A) 0.86 - 1.14 Final       ASSESSMENT / PLAN  INR assessment THER    Recheck INR In: 9 WEEKS    INR Location Clinic      Anticoagulation Summary  As of 2019    INR goal:   2.0-3.0   TTR:   87.9 % (4.4 y)   INR used for dosin.5 (2019)   Warfarin maintenance plan:   2 mg (2 mg x 1) every Mon, Fri; 4 mg (2 mg x 2) all other days   Full warfarin instructions:   2 mg every Mon, Fri; 4 mg all other days   Weekly warfarin total:   24 mg   No change documented:   Gloria Arboleda, RN   Plan last modified:   Noelle Harrington RN (2015)   Next INR check:   10/24/2019   Priority:   INR   Target end date:   Indefinite    Indications     Atrial fibrillation (H) [I48.91]  Long term current use of anticoagulant therapy [Z79.01]             Anticoagulation Episode Summary     INR check location:       Preferred lab:       Send INR reminders to:   [facility]    Comments:   * warfarin 2 mg tabs. Dr Shelley Lennox is pulmonologist. 10/18/18 okay for 9 week rechecks per Dr. Foster      Anticoagulation Care Providers     Provider Role Specialty Phone number    Marcelino Foster MD [facility] [phone]            See the Encounter Report to view Anticoagulation Flowsheet and Dosing Calendar (Go to Encounters tab in chart review, and find the Anticoagulation Therapy Visit)        Gloria Arboleda RN

## 2019-08-28 ENCOUNTER — OFFICE VISIT (OUTPATIENT)
Dept: DERMATOLOGY | Facility: CLINIC | Age: 74
End: 2019-08-28
Payer: MEDICARE

## 2019-08-28 VITALS — HEART RATE: 70 BPM | OXYGEN SATURATION: 94 % | SYSTOLIC BLOOD PRESSURE: 132 MMHG | DIASTOLIC BLOOD PRESSURE: 83 MMHG

## 2019-08-28 DIAGNOSIS — D48.5 NEOPLASM OF UNCERTAIN BEHAVIOR OF SKIN: ICD-10-CM

## 2019-08-28 DIAGNOSIS — L71.9 ROSACEA: Primary | ICD-10-CM

## 2019-08-28 PROCEDURE — 11403 EXC TR-EXT B9+MARG 2.1-3CM: CPT | Mod: 51 | Performed by: DERMATOLOGY

## 2019-08-28 PROCEDURE — 13121 CMPLX RPR S/A/L 2.6-7.5 CM: CPT | Performed by: DERMATOLOGY

## 2019-08-28 PROCEDURE — 99213 OFFICE O/P EST LOW 20 MIN: CPT | Mod: 25 | Performed by: DERMATOLOGY

## 2019-08-28 PROCEDURE — 88305 TISSUE EXAM BY PATHOLOGIST: CPT | Mod: TC | Performed by: DERMATOLOGY

## 2019-08-28 NOTE — LETTER
8/28/2019         RE: Stef Bhatt  [address] [facility] [address]        Dear Colleague,    Thank you for referring your patient, Stef Bhatt, to the [facility]. Please see a copy of my visit note below.    Stef Bhatt is a 74 year old year old male patient here today for f/u rosacea, on doxy and metrocream.  Also here for removal of tender lesion on arm.   .  Patient states this has been present for a whle.  Patient reports the following symptoms:  Tender.  Rosacea, clear off of doxy, no issues.  .  Patient reports the following previous treatments see above.  Patient reports the following modifying factors none.  Associated symptoms: none.  Patient has no other skin complaints today.  Remainder of the HPI, Meds, PMH, Allergies, FH, and SH was reviewed in chart.      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     Obesity, unspecified      Pure hypercholesterolemia      Tobacco use disorder     quit in January 2005     Trigger finger (acquired)      Type II or unspecified type diabetes mellitus without mention of complication, not stated as uncontrolled        Past Surgical History:   Procedure Laterality Date     COLONOSCOPY  1/27/2011    COMBINED COLONOSCOPY, REMOVE TUMOR/POLYP/LESION BY SNARE performed by MARIUSZ ELIZALDE at [facility]     CORONARY ARTERY BYPASS  dec 2006     FLEXIBLE SIGMOIDOSCOPY  7/15/05     PHACOEMULSIFICATION WITH STANDARD INTRAOCULAR LENS IMPLANT  1/19/2012    Procedure:PHACOEMULSIFICATION WITH STANDARD INTRAOCULAR LENS IMPLANT; Right Kelman Phacoemulsification with introcular lens implant ; Surgeon:ROBY DORADO; Location:[facility]     PHACOEMULSIFICATION WITH STANDARD INTRAOCULAR LENS IMPLANT  2/2/2012    Procedure:PHACOEMULSIFICATION WITH STANDARD INTRAOCULAR LENS IMPLANT; Left Kelman Phacoemulsification  with introcular lens implant ; Surgeon:ROBY DORADO; Location:[facility]     SURGICAL HISTORY OF -       chay     SURGICAL HISTORY OF -       choledochocystotomy     SURGICAL HISTORY OF -       lumbar     SURGICAL HISTORY OF -       colonoscopy        Family History   Problem Relation Age of Onset     Diabetes Brother      Breast Cancer Sister      Cancer Sister         lymph nodes     Heart Disease Father        Social History     Socioeconomic History     Marital status:      Spouse name: Not on file     Number of children: Not on file     Years of education: Not on file     Highest education level: Not on file   Occupational History     Not on file   Social Needs     Financial resource strain: Not on file     Food insecurity:     Worry: Not on file     Inability: Not on file     Transportation needs:     Medical: Not on file     Non-medical: Not on file   Tobacco Use     Smoking status: Former Smoker     Packs/day: 1.00     Types: Cigarettes, Cigars     Last attempt to quit: 2006     Years since quittin.6     Smokeless tobacco: Never Used     Tobacco comment: cigar once in a blue moon-quit again about 6 weeks ago   Substance and Sexual Activity     Alcohol use: Yes     Comment: occasional     Drug use: No     Sexual activity: Not on file   Lifestyle     Physical activity:     Days per week: Not on file     Minutes per session: Not on file     Stress: Not on file   Relationships     Social connections:     Talks on phone: Not on file     Gets together: Not on file     Attends [employer] service: Not on file     Active member of club or organization: Not on file     Attends meetings of clubs or organizations: Not on file     Relationship status: Not on file     Intimate partner violence:     Fear of current or ex partner: Not on file     Emotionally abused: Not on file     Physically abused: Not on file     Forced sexual activity: Not on file   Other Topics Concern     Parent/sibling w/ CABG,  MI or angioplasty before 65F 55M? No   Social History Narrative     Not on file       Outpatient Encounter Medications as of 2019   Medication Sig Dispense Refill     ACCU-CHEK SOFTCLIX LANCETS MISC use as directed to test blood sugars up to once daily; pt will call to order 100 Each 11     albuterol (VENTOLIN HFA) 108 (90 BASE) MCG/ACT inhaler Inhale 2 puffs into the lungs every 4 hours as needed Pt will call to order 1 Inhaler 11     ascorbic acid (VITAMIN C) 1000 MG TABS Take 1,000 mg by mouth daily In winter only       aspirin 81 MG tablet Take 81 mg by mouth every evening. 1 tablet 3     blood glucose (ACCU-CHEK COMPACT DRUM) test strip Use to test blood sugar three times daily or as directed. 270 strip 1     diphenhydrAMINE-acetaminophen (TYLENOL PM EXTRA STRENGTH)  MG tablet Take 1 tablet by mouth nightly as needed for sleep       fexofenadine (ALLEGRA) 180 MG tablet Take 180 mg by mouth every evening  30 tablet 1     glipiZIDE (GLIPIZIDE XL) 10 MG 24 hr tablet Take 2 in the am 180 tablet 3     glucose blood VI test strips strip Use as directed to check sugars twice a day 3 Month 3     guaiFENesin (MUCINEX) 600 MG 12 hr tablet Take 400-600 mg by mouth 2 times daily        ipratropium - albuterol 0.5 mg/2.5 mg/3 mL (DUONEB) 0.5-2.5 (3) MG/3ML nebulization Take 1 vial by nebulization every 6 hours as needed for shortness of breath / dyspnea or wheezing       ketoconazole (NIZORAL) 2 % shampoo Apply daily then rinse. Pt will call to order 120 mL 11     [] levofloxacin (LEVAQUIN) 500 MG tablet Take 1 tablet (500 mg) by mouth daily for 10 days 10 tablet 5     losartan (COZAAR) 50 MG tablet Take 1 tablet (50 mg) by mouth daily 90 tablet 3     metoprolol succinate ER (TOPROL-XL) 50 MG 24 hr tablet 100 mg in am, 50 mg in pm 270 tablet 3     metroNIDAZOLE (METROCREAM) 0.75 % external cream Apply topically 2 times daily 45 g 3     nitroGLYcerin (NITROSTAT) 0.4 MG sublingual tablet Place 1 tablet (0.4  mg) under the tongue every 5 minutes as needed for chest pain 25 tablet 11     order for DME Equipment being ordered: 1 pair Diabetic shoes 1 Device 0     order for DME Equipment being ordered: neoprene large hinged knee brace       predniSONE (DELTASONE) 10 MG tablet Take 10 mg by mouth daily       pregabalin (LYRICA) 50 MG capsule Take 2 in the am, 2 in the pm. 120 capsule 5     simvastatin (ZOCOR) 20 MG tablet Take 1 tablet (20 mg) by mouth daily 90 tablet 3     umeclidinium-vilanterol (ANORO ELLIPTA) 62.5-25 MCG/INH oral inhaler Inhale 1 puff into the lungs daily       warfarin (COUMADIN) 2 MG tablet Take 2 mg Mon & Fri; 4 mg rest of the week or as directed by Anticoagulation Clinic. 150 tablet 3     No facility-administered encounter medications on file as of 8/28/2019.              Review Of Systems  Skin: As above  Eyes: negative  Ears/Nose/Throat: negative  Respiratory: No shortness of breath, dyspnea on exertion, cough, or hemoptysis  Cardiovascular: negative  Gastrointestinal: negative  Genitourinary: negative  Musculoskeletal: negative  Neurologic: negative  Psychiatric: negative  Hematologic/Lymphatic/Immunologic: negative  Endocrine: negative      O:   NAD, WDWN, Alert & Oriented, Mood & Affect wnl, Vitals stable   Here today alone   There were no vitals taken for this visit.   General appearance normal   Vitals stable   Alert, oriented and in no acute distress      Red macule osn face   L volar upper arm 2.3cm red plaque   The remainder of expanded problem focused exam was unremarkable; the following areas were examined:  scalp/hair, conjunctiva/lids, face, neck, lips, chest, digits/nails, RUE, LUE.      Eyes: Conjunctivae/lids:Normal     ENT: Lips, buccal mucosa, tongue: normal    MSK:Normal    Cardiovascular: peripheral edema none    Pulm: Breathing Normal    Lymph Nodes: No Head and Neck Lymphadenopathy     Neuro/Psych: Orientation:Normal; Mood/Affect:Normal      A/P:  1. Rosacea  Stable on  metrocream  Uv precuations and skin care discussed with patient     2. angiolipoma v hemangioma  BENIGN LESIONS DISCUSSED WITH PATIENT:  I discussed the specifics of tumor, prognosis, and genetics of benign lesions.  I explained that treatment of these lesions would be purely cosmetic and not medically neccessary.  I discussed with patient different removal options including excision, cautery and /or laser.      Nature and genetics of benign skin lesions dicussed with patient.  Signs and Symptoms of skin cancer discussed with patient.  Patient encouraged to perform monthly skin exams.  UV precautions reviewed with patient.  Skin care regimen reviewed with patient: Eliminate harsh soaps, i.e. Dial, zest, irsih spring; Mild soaps such as Cetaphil or Dove sensitive skin, avoid hot or cold showers, aggressive use of emollients including vanicream, cetaphil or cerave discussed with patient.    Risks of non-melanoma skin cancer discussed with patient   Return to clinic 4 months    PROCEDURE NOTE  L arm 2.3  EXCISION OF BENIGN LESION AND COMPLEX: After thorough discussion of PGACAC, consent obtained, anesthesia and prep, the margins of the lesion were identified and an elliptical incision was made encompassing the lesion. The incisions were made through the skin and down to and including the subcutaneous tissue. The lesion was removed en bloc and submitted for perms pathologic review. The wound edges were widely undermined until adequate tissue mobility was obtained. hemostasis was achieved. The wound edges were then closed in a layered fashion, being careful not to leave any dead space. Postoperative length was 4.8 cm.   EBL minimal; complications none; wound care routine. The patient was discharged in good condition and will return in one week for wound evaluation.      Again, thank you for allowing me to participate in the care of your patient.        Sincerely,        Tee Erwin MD

## 2019-08-28 NOTE — PROGRESS NOTES
Stef Bhatt is a 74 year old year old male patient here today for f/u rosacea, on doxy and metrocream.  Also here for removal of tender lesion on arm.   .  Patient states this has been present for a whle.  Patient reports the following symptoms:  Tender.  Rosacea, clear off of doxy, no issues.  .  Patient reports the following previous treatments see above.  Patient reports the following modifying factors none.  Associated symptoms: none.  Patient has no other skin complaints today.  Remainder of the HPI, Meds, PMH, Allergies, FH, and SH was reviewed in chart.      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     Obesity, unspecified      Pure hypercholesterolemia      Tobacco use disorder     quit in January 2005     Trigger finger (acquired)      Type II or unspecified type diabetes mellitus without mention of complication, not stated as uncontrolled        Past Surgical History:   Procedure Laterality Date     COLONOSCOPY  1/27/2011    COMBINED COLONOSCOPY, REMOVE TUMOR/POLYP/LESION BY SNARE performed by MARIUSZ ELIZALDE at [facility]     CORONARY ARTERY BYPASS  dec 2006     FLEXIBLE SIGMOIDOSCOPY  7/15/05     PHACOEMULSIFICATION WITH STANDARD INTRAOCULAR LENS IMPLANT  1/19/2012    Procedure:PHACOEMULSIFICATION WITH STANDARD INTRAOCULAR LENS IMPLANT; Right Kelman Phacoemulsification with introcular lens implant ; Surgeon:ROBY DORADO; Location:[facility]     PHACOEMULSIFICATION WITH STANDARD INTRAOCULAR LENS IMPLANT  2/2/2012    Procedure:PHACOEMULSIFICATION WITH STANDARD INTRAOCULAR LENS IMPLANT; Left Kelman Phacoemulsification with introcular lens implant ; Surgeon:ROBY DORADO; Location:[facility]     SURGICAL HISTORY OF -       chay     SURGICAL HISTORY OF -       choledochocystotomy     SURGICAL HISTORY OF -       lumbar     SURGICAL HISTORY OF -   8/94     colonoscopy        Family History   Problem Relation Age of Onset     Diabetes Brother      Breast Cancer Sister      Cancer Sister         lymph nodes     Heart Disease Father        Social History     Socioeconomic History     Marital status:      Spouse name: Not on file     Number of children: Not on file     Years of education: Not on file     Highest education level: Not on file   Occupational History     Not on file   Social Needs     Financial resource strain: Not on file     Food insecurity:     Worry: Not on file     Inability: Not on file     Transportation needs:     Medical: Not on file     Non-medical: Not on file   Tobacco Use     Smoking status: Former Smoker     Packs/day: 1.00     Types: Cigarettes, Cigars     Last attempt to quit: 2006     Years since quittin.6     Smokeless tobacco: Never Used     Tobacco comment: cigar once in a blue moon-quit again about 6 weeks ago   Substance and Sexual Activity     Alcohol use: Yes     Comment: occasional     Drug use: No     Sexual activity: Not on file   Lifestyle     Physical activity:     Days per week: Not on file     Minutes per session: Not on file     Stress: Not on file   Relationships     Social connections:     Talks on phone: Not on file     Gets together: Not on file     Attends Bahai service: Not on file     Active member of club or organization: Not on file     Attends meetings of clubs or organizations: Not on file     Relationship status: Not on file     Intimate partner violence:     Fear of current or ex partner: Not on file     Emotionally abused: Not on file     Physically abused: Not on file     Forced sexual activity: Not on file   Other Topics Concern     Parent/sibling w/ CABG, MI or angioplasty before 65F 55M? No   Social History Narrative     Not on file       Outpatient Encounter Medications as of 2019   Medication Sig Dispense Refill     ACCU-CHEK SOFTCLIX LANCETS MISC use as directed to test blood sugars  up to once daily; pt will call to order 100 Each 11     albuterol (VENTOLIN HFA) 108 (90 BASE) MCG/ACT inhaler Inhale 2 puffs into the lungs every 4 hours as needed Pt will call to order 1 Inhaler 11     ascorbic acid (VITAMIN C) 1000 MG TABS Take 1,000 mg by mouth daily In winter only       aspirin 81 MG tablet Take 81 mg by mouth every evening. 1 tablet 3     blood glucose (ACCU-CHEK COMPACT DRUM) test strip Use to test blood sugar three times daily or as directed. 270 strip 1     diphenhydrAMINE-acetaminophen (TYLENOL PM EXTRA STRENGTH)  MG tablet Take 1 tablet by mouth nightly as needed for sleep       fexofenadine (ALLEGRA) 180 MG tablet Take 180 mg by mouth every evening  30 tablet 1     glipiZIDE (GLIPIZIDE XL) 10 MG 24 hr tablet Take 2 in the am 180 tablet 3     glucose blood VI test strips strip Use as directed to check sugars twice a day 3 Month 3     guaiFENesin (MUCINEX) 600 MG 12 hr tablet Take 400-600 mg by mouth 2 times daily        ipratropium - albuterol 0.5 mg/2.5 mg/3 mL (DUONEB) 0.5-2.5 (3) MG/3ML nebulization Take 1 vial by nebulization every 6 hours as needed for shortness of breath / dyspnea or wheezing       ketoconazole (NIZORAL) 2 % shampoo Apply daily then rinse. Pt will call to order 120 mL 11     [] levofloxacin (LEVAQUIN) 500 MG tablet Take 1 tablet (500 mg) by mouth daily for 10 days 10 tablet 5     losartan (COZAAR) 50 MG tablet Take 1 tablet (50 mg) by mouth daily 90 tablet 3     metoprolol succinate ER (TOPROL-XL) 50 MG 24 hr tablet 100 mg in am, 50 mg in pm 270 tablet 3     metroNIDAZOLE (METROCREAM) 0.75 % external cream Apply topically 2 times daily 45 g 3     nitroGLYcerin (NITROSTAT) 0.4 MG sublingual tablet Place 1 tablet (0.4 mg) under the tongue every 5 minutes as needed for chest pain 25 tablet 11     order for DME Equipment being ordered: 1 pair Diabetic shoes 1 Device 0     order for DME Equipment being ordered: neoprene large hinged knee brace        predniSONE (DELTASONE) 10 MG tablet Take 10 mg by mouth daily       pregabalin (LYRICA) 50 MG capsule Take 2 in the am, 2 in the pm. 120 capsule 5     simvastatin (ZOCOR) 20 MG tablet Take 1 tablet (20 mg) by mouth daily 90 tablet 3     umeclidinium-vilanterol (ANORO ELLIPTA) 62.5-25 MCG/INH oral inhaler Inhale 1 puff into the lungs daily       warfarin (COUMADIN) 2 MG tablet Take 2 mg Mon & Fri; 4 mg rest of the week or as directed by Anticoagulation Clinic. 150 tablet 3     No facility-administered encounter medications on file as of 8/28/2019.              Review Of Systems  Skin: As above  Eyes: negative  Ears/Nose/Throat: negative  Respiratory: No shortness of breath, dyspnea on exertion, cough, or hemoptysis  Cardiovascular: negative  Gastrointestinal: negative  Genitourinary: negative  Musculoskeletal: negative  Neurologic: negative  Psychiatric: negative  Hematologic/Lymphatic/Immunologic: negative  Endocrine: negative      O:   NAD, WDWN, Alert & Oriented, Mood & Affect wnl, Vitals stable   Here today alone   There were no vitals taken for this visit.   General appearance normal   Vitals stable   Alert, oriented and in no acute distress      Red macule osn face   L volar upper arm 2.3cm red plaque   The remainder of expanded problem focused exam was unremarkable; the following areas were examined:  scalp/hair, conjunctiva/lids, face, neck, lips, chest, digits/nails, RUE, LUE.      Eyes: Conjunctivae/lids:Normal     ENT: Lips, buccal mucosa, tongue: normal    MSK:Normal    Cardiovascular: peripheral edema none    Pulm: Breathing Normal    Lymph Nodes: No Head and Neck Lymphadenopathy     Neuro/Psych: Orientation:Normal; Mood/Affect:Normal      A/P:  1. Rosacea  Stable on metrocream  Uv precuations and skin care discussed with patient     2. angiolipoma v hemangioma  BENIGN LESIONS DISCUSSED WITH PATIENT:  I discussed the specifics of tumor, prognosis, and genetics of benign lesions.  I explained that  treatment of these lesions would be purely cosmetic and not medically neccessary.  I discussed with patient different removal options including excision, cautery and /or laser.      Nature and genetics of benign skin lesions dicussed with patient.  Signs and Symptoms of skin cancer discussed with patient.  Patient encouraged to perform monthly skin exams.  UV precautions reviewed with patient.  Skin care regimen reviewed with patient: Eliminate harsh soaps, i.e. Dial, zest, irsih spring; Mild soaps such as Cetaphil or Dove sensitive skin, avoid hot or cold showers, aggressive use of emollients including vanicream, cetaphil or cerave discussed with patient.    Risks of non-melanoma skin cancer discussed with patient   Return to clinic 4 months    PROCEDURE NOTE  L arm 2.3  EXCISION OF BENIGN LESION AND COMPLEX: After thorough discussion of PGACAC, consent obtained, anesthesia and prep, the margins of the lesion were identified and an elliptical incision was made encompassing the lesion. The incisions were made through the skin and down to and including the subcutaneous tissue. The lesion was removed en bloc and submitted for perms pathologic review. The wound edges were widely undermined until adequate tissue mobility was obtained. hemostasis was achieved. The wound edges were then closed in a layered fashion, being careful not to leave any dead space. Postoperative length was 4.8 cm.   EBL minimal; complications none; wound care routine. The patient was discharged in good condition and will return in one week for wound evaluation.

## 2019-08-28 NOTE — LETTER
[facility]  [address] [address]  Phone: [phone]    September 4, 2019    Stef HENNY Bhatt                                                                                                                [address] [facility] [address]            Dear Mr. Bhatt,    Your skin biopsy returned as:    Ellipse, left volar upper arm specimen:     Glomangioma (glomuvenous malformation)    No further treatment is further treatment.    Thank you,      Tee Erwin MD/ [facility]

## 2019-08-28 NOTE — PATIENT INSTRUCTIONS
Sutured Wound Care   ARM    [facility]: [phone]    [facility]: [phone]          ? No strenuous activity for 48 hours. Resume moderate activity in 48 hours. No heavy exercising until you are seen for follow up in one week.     ? Take Tylenol as needed for discomfort.                         ? Do not drink alcoholic beverages for 48 hours.     ? Keep the pressure bandage in place for 24 hours. If the bandage becomes blood tinged or loose, reinforce it with gauze and tape.        (Refer to the reverse side of this page for management of bleeding).    ? Remove pressure bandage in 24 hours     ? Leave the flat bandage in place until your follow up appointment.    ? Keep the bandage dry. Wash around it carefully.    ? If the tape becomes soiled or starts to come off, reinforce it with additional paper tape.    ? Do not smoke for 3 weeks; smoking is detrimental to wound healing.    ? It is normal to have swelling and bruising around the surgical site. The bruising will fade in approximately 10-14 days. Elevate the area to reduce swelling.    ? Numbness, itchiness and sensitivity to temperature changes can occur after surgery and may take up to 18 months to normalize.      POSSIBLE COMPLICATIONS    BLEEDIN. Leave the bandage in place.  2. Use tightly rolled up gauze or a cloth to apply direct pressure over the bandage for 20   minutes.  3. Reapply pressure for an additional 20 minutes if necessary  4. Call the office or go to the nearest emergency room if pressure fails to stop the bleeding.  5. Use additional gauze and tape to maintain pressure once the bleeding has stopped.        PAIN:    1. Post operative pain should slowly get better, never worse.  2. A severe increase in pain may indicate a problem. Call the office if this occurs.    In case of emergency phone:Dr Erwin [phone]

## 2019-09-03 ENCOUNTER — OFFICE VISIT (OUTPATIENT)
Dept: FAMILY MEDICINE | Facility: CLINIC | Age: 74
End: 2019-09-03
Payer: MEDICARE

## 2019-09-03 VITALS
WEIGHT: 230.6 LBS | HEIGHT: 70 IN | BODY MASS INDEX: 33.01 KG/M2 | TEMPERATURE: 98.4 F | SYSTOLIC BLOOD PRESSURE: 125 MMHG | HEART RATE: 90 BPM | RESPIRATION RATE: 24 BRPM | OXYGEN SATURATION: 92 % | DIASTOLIC BLOOD PRESSURE: 80 MMHG

## 2019-09-03 DIAGNOSIS — L97.429 DIABETIC ULCER OF LEFT HEEL ASSOCIATED WITH TYPE 1 DIABETES MELLITUS, UNSPECIFIED ULCER STAGE (H): Primary | ICD-10-CM

## 2019-09-03 DIAGNOSIS — E10.621 DIABETIC ULCER OF LEFT HEEL ASSOCIATED WITH TYPE 1 DIABETES MELLITUS, UNSPECIFIED ULCER STAGE (H): Primary | ICD-10-CM

## 2019-09-03 DIAGNOSIS — J43.9 PULMONARY EMPHYSEMA, UNSPECIFIED EMPHYSEMA TYPE (H): ICD-10-CM

## 2019-09-03 LAB — COPATH REPORT: NORMAL

## 2019-09-03 PROCEDURE — 99214 OFFICE O/P EST MOD 30 MIN: CPT | Performed by: FAMILY MEDICINE

## 2019-09-03 ASSESSMENT — MIFFLIN-ST. JEOR: SCORE: 1802.24

## 2019-09-03 NOTE — PROGRESS NOTES
Subjective     Stef Bhatt is a 74 year old male who presents to clinic today for the following health issues:    Chief Complaint   Patient presents with     sores on feet     Left foot, sore on small toe and heel, was seeing wound care - last visit in May, did see nail nurse in July and was advised to follow up with provider.  The one on the toe has improved.       COPD     exacerbation     HPI   COPD Follow-Up    Overall, how are your COPD symptoms since your last clinic visit?  Much worse    How much fatigue or shortness of breath do you have when you are walking?  A lot more than usual    How much shortness of breath do you have when you are resting?  Same as usual    How often do you cough? A couple times an hour today, non stop last night    Have you noticed any change in your sputum/phlegm?  Yes- more yellow, seems a little thicker than normal    Have you experienced a recent fever? unsure    Please describe how far you can walk without stopping to rest:  The length of 1-2 rooms    How many flights of stairs are you able to walk up without stopping?  None    Have you had any Emergency Room Visits, Urgent Care Visits, or Hospital Admissions because of your COPD since your last office visit?  No    History   Smoking Status     Former Smoker     Packs/day: 1.00     Types: Cigarettes, Cigars     Quit date: 1/1/2006   Smokeless Tobacco     Never Used     Comment: cigar once in a blue moon-quit again about 6 weeks ago     No results found for: FEV1, DFI2QLT      How many servings of fruits and vegetables do you eat daily?  0-1    On average, how many sweetened beverages do you drink each day (soda, juice, sweet tea, etc)?   0    How many days per week do you miss taking your medication? 0        Current Outpatient Medications:      ACCU-CHEK SOFTCLIX LANCETS MISC, use as directed to test blood sugars up to once daily; pt will call to order, Disp: 100 Each, Rfl: 11     albuterol (VENTOLIN HFA) 108 (90 BASE)  MCG/ACT inhaler, Inhale 2 puffs into the lungs every 4 hours as needed Pt will call to order, Disp: 1 Inhaler, Rfl: 11     aspirin 81 MG tablet, Take 81 mg by mouth every evening., Disp: 1 tablet, Rfl: 3     blood glucose (ACCU-CHEK COMPACT DRUM) test strip, Use to test blood sugar three times daily or as directed., Disp: 270 strip, Rfl: 1     diphenhydrAMINE-acetaminophen (TYLENOL PM EXTRA STRENGTH)  MG tablet, Take 1 tablet by mouth nightly as needed for sleep, Disp: , Rfl:      fexofenadine (ALLEGRA) 180 MG tablet, Take 180 mg by mouth every evening , Disp: 30 tablet, Rfl: 1     glipiZIDE (GLIPIZIDE XL) 10 MG 24 hr tablet, Take 2 in the am, Disp: 180 tablet, Rfl: 3     glucose blood VI test strips strip, Use as directed to check sugars twice a day, Disp: 3 Month, Rfl: 3     guaiFENesin (MUCINEX) 600 MG 12 hr tablet, Take 400-600 mg by mouth 2 times daily , Disp: , Rfl:      ipratropium - albuterol 0.5 mg/2.5 mg/3 mL (DUONEB) 0.5-2.5 (3) MG/3ML nebulization, Take 1 vial by nebulization every 6 hours as needed for shortness of breath / dyspnea or wheezing, Disp: , Rfl:      ketoconazole (NIZORAL) 2 % shampoo, Apply daily then rinse. Pt will call to order, Disp: 120 mL, Rfl: 11     losartan (COZAAR) 50 MG tablet, Take 1 tablet (50 mg) by mouth daily, Disp: 90 tablet, Rfl: 3     metoprolol succinate ER (TOPROL-XL) 50 MG 24 hr tablet, 100 mg in am, 50 mg in pm, Disp: 270 tablet, Rfl: 3     metroNIDAZOLE (METROCREAM) 0.75 % external cream, Apply topically 2 times daily, Disp: 45 g, Rfl: 3     nitroGLYcerin (NITROSTAT) 0.4 MG sublingual tablet, Place 1 tablet (0.4 mg) under the tongue every 5 minutes as needed for chest pain, Disp: 25 tablet, Rfl: 11     predniSONE (DELTASONE) 10 MG tablet, Take 10 mg by mouth daily, Disp: , Rfl:      pregabalin (LYRICA) 50 MG capsule, Take 2 in the am, 2 in the pm., Disp: 120 capsule, Rfl: 5     simvastatin (ZOCOR) 20 MG tablet, Take 1 tablet (20 mg) by mouth daily, Disp: 90  "tablet, Rfl: 3     umeclidinium-vilanterol (ANORO ELLIPTA) 62.5-25 MCG/INH oral inhaler, Inhale 1 puff into the lungs daily, Disp: , Rfl:      warfarin (COUMADIN) 2 MG tablet, Take 2 mg Mon & Fri; 4 mg rest of the week or as directed by Anticoagulation Clinic., Disp: 150 tablet, Rfl: 3     ascorbic acid (VITAMIN C) 1000 MG TABS, Take 1,000 mg by mouth daily In winter only, Disp: , Rfl:      order for DME, Equipment being ordered: 1 pair Diabetic shoes, Disp: 1 Device, Rfl: 0     order for DME, Equipment being ordered: neoprene large hinged knee brace, Disp: , Rfl:     Patient Active Problem List   Diagnosis     Coronary atherosclerosis     Essential hypertension     Hemorrhage of rectum and anus     Allergic Rhinitis     AK (actinic keratosis)     HYPERLIPIDEMIA LDL GOAL <100     Ventral hernia     Colon polyp     Dysphonia     Senile nuclear sclerosis     Acute myocardial infarction of other specified sites, episode of care unspecified     Health Care Home     Atrial fibrillation (H)     Advance Care Planning     Edge's neuroma     COPD (chronic obstructive pulmonary disease) (H)     Long term current use of anticoagulant therapy     Hyperlipidemia with target LDL less than 100     Morbid obesity (H)     Type 2 diabetes mellitus with diabetic neuropathy (H)     Peripheral polyneuropathy     Chronic bronchitis, unspecified chronic bronchitis type (H)     DJD (degenerative joint disease), cervical     Benign neoplasm of skin of trunk, except scrotum     Decubitus ulcer of buttock, stage 1, unspecified laterality       Blood pressure 125/80, pulse 90, temperature 98.4  F (36.9  C), temperature source Tympanic, resp. rate 24, height 1.778 m (5' 10\"), weight 104.6 kg (230 lb 9.6 oz), SpO2 92 %.    Exam:  GENERAL APPEARANCE: healthy, alert and no distress  EYES: EOMI,  PERRL  RESP: lungs clear to auscultation - no rales, rhonchi or wheezes  RESP: lungs clear to auscultation - no rales, rhonchi or wheezes and decreased " breath sounds bilaterally  CV: regular rates and rhythm, normal S1 S2, no S3 or S4 and no murmur, click or rub -  ABDOMEN:  soft, nontender, no HSM or masses and bowel sounds normal  MS: On the left foot there is a dry scab on the fifth finger. On the left heel there is a dry scab 9-10 mm in diameter. The pulses are not palpable in the feet.   PSYCH: mentation appears normal and affect normal/bright      (E10.621,  L97.429) Diabetic ulcer of left heel associated with type 1 diabetes mellitus, unspecified ulcer stage (H)  (primary encounter diagnosis)  Comment:   Plan: For the left foot there is a dry scab on the fifth finger. This has been healing for at least 6 weeks. Monitor this for resolution and for possible infection.   Make sure the shoes are long enough and wide enough. On the left heel there is a dry scab 9-10 mm in diameter. Take the pressure off this and use the corn pad and elevate the feet when possible.     (J43.9) Pulmonary emphysema, unspecified emphysema type (H)  Comment:   Plan: RESPIRATORY THERAPY REFERRAL        We discussed good hygiene and hydration, and weight control. Get the flu shot soon.   Avoid contagious exposures. The referral to Resp therapy is done and call [phone] for the   Oxygen saturation testing to see if this is needed at home.       Marcelino Foster MD

## 2019-09-03 NOTE — PATIENT INSTRUCTIONS
Thank you for choosing [facility].  You may be receiving an email and/or telephone survey request from [facility] Customer Experience regarding your visit today.  Please take a few minutes to respond to the survey to let us know how we are doing.      If you have questions or concerns, please contact us via [employer] or you can contact your care team at [phone].    Our Clinic hours are:  Monday 6:40 am  to 7:00 pm  Tuesday -Friday 6:40 am to 5:00 pm    The [place] outpatient lab hours are:  Monday - Friday 6:10 am to 4:45 pm  Saturdays 7:00 am to 11:00 am  Appointments are required, call [phone]    If you have clinical questions after hours or would like to schedule an appointment,  call the clinic at [phone].    (E10.621,  W07.429) Diabetic ulcer of left heel associated with type 1 diabetes mellitus, unspecified ulcer stage (H)  (primary encounter diagnosis)  Comment:   Plan: For the left foot there is a dry scab on the fifth finger. This has been healing for at least 6 weeks. Monitor this for resolution and for possible infection.   Make sure the shoes are long enough and wide enough. On the left heel there is a dry scab 9-10 mm in diameter. Take the pressure off this and use the corn pad and elevate the feet when possible.     (J43.9) Pulmonary emphysema, unspecified emphysema type (H)  Comment:   Plan: RESPIRATORY THERAPY REFERRAL        We discussed good hygiene and hydration, and weight control. Get the flu shot soon.   Avoid contagious exposures. The referral to Resp therapy is done and call [phone] for the   Oxygen saturation testing to see if this is needed at home.

## 2019-09-04 ENCOUNTER — ALLIED HEALTH/NURSE VISIT (OUTPATIENT)
Dept: DERMATOLOGY | Facility: CLINIC | Age: 74
End: 2019-09-04
Payer: MEDICARE

## 2019-09-04 DIAGNOSIS — Z48.01 ENCOUNTER FOR CHANGE OR REMOVAL OF SURGICAL WOUND DRESSING: Primary | ICD-10-CM

## 2019-09-04 PROCEDURE — 99207 ZZC NO CHARGE NURSE ONLY: CPT

## 2019-09-04 NOTE — PATIENT INSTRUCTIONS
WOUND CARE INSTRUCTIONS  for  ONE WEEK AFTER SURGERY    Left arm      1) Leave flat bandage on your skin for one week after today s bandage change.  2) In one week when you remove the bandage, you may resume your regular skin care routine, including washing with mild soap and water, applying moisturizer, make-up and sunscreen.    3) If there are any open or bleeding areas at the incision/graft site you should begin to cover the area with a bandage daily as follows:    1) Clean and dry the area with plain tap water using a Q-tip or sterile gauze pad.  2) Apply Polysporin or Bacitracin ointment to the open area.  3) Cover the wound with a band-aid or a sterile non-stick gauze pad and micropore paper tape.         SIGNS OF INFECTION  - If you notice any of these signs of infection, call your doctor right away: expanding redness around the wound.  - Yellow or greenish-colored pus or cloudy wound drainage.    - Red streaking spreading from the wound.  - Increased swelling, tenderness, or pain around the wound.   - Fever.    Please remember that yellow and clear drainage from a wound can be normal and related to normal wound healing.  Isolated drainage from a wound without a combination of the above features does not indicate infection.       *Once the bandages are removed, the scar will be red and firm (especially in the lip/chin area). This is normal and will fade in time. It might take 6-12 months for this to happen.     *Massaging the area will help the scar soften and fade quicker. Begin to massage the area one month after the bandages have been removed. To massage apply pressure directly and firmly over the scar with the fingertips and move in a circular motion. Massage the area for a few minutes several times a day. Continue to massage the site for several months.    *Approximately 6-8 weeks after surgery it is not uncommon to see the formation of  tender pimple-like  bump along the scar. This is normal. As the  scar continues to mature and the stitches underneath the skin begin to dissolve, this might occur. Do not pick or squeeze, this will resolve on it s own. Should one break open producing a small amount of drainage, apply Polysporin or Bacitracin ointment a few times a day until the wound is completely healed.    *Numbness in the surgical area is expected. It might take 12-18 months for the feeling to return to normal. During this time sensations of itchiness, tingling and occasional sharp pains might be noted. These feelings are normal and will subside once the nerves have completely healed.         IN CASE OF EMERGENCY: Dr Erwin [phone]     If you were seen in [place] call: [phone]    If you were seen in [place] call: [phone]

## 2019-09-04 NOTE — PROGRESS NOTES
Pt returned to clinic for post surgery 1 week follow up bandage change. Pt has no complaints, denies pain. Bandage removed from left arm, area cleansed with normal saline. Site is healing and wound edges approximating well. Reapplied new steri strips and paper tape.    Advised to watch for signs/sx of infection; spreading redness, drainage, odor, fever. Call or report promptly to clinic. Pt given written instructions and informed to rtc as needed. Patient verbalized understanding.     Martina Tan LPN.................9/4/2019

## 2019-09-20 DIAGNOSIS — G62.9 PERIPHERAL POLYNEUROPATHY: ICD-10-CM

## 2019-09-20 RX ORDER — PREGABALIN 50 MG/1
CAPSULE ORAL
Qty: 120 CAPSULE | Refills: 3 | Status: SHIPPED | OUTPATIENT
Start: 2019-09-20

## 2019-09-20 NOTE — TELEPHONE ENCOUNTER
Requested Prescriptions   Pending Prescriptions Disp Refills     pregabalin (LYRICA) 50 MG capsule [Pharmacy Med Name: PREGABALIN 50 MG CAPSULE] 120 capsule      Sig: Take 2 Capsules EVERY MORNING AND Take 2 Capsules EVERY EVENING       There is no refill protocol information for this order              Last Written Prescription Date:  3/29/2019  Last Fill Quantity: 120,   # refills: 5  Last Office Visit: 9/3/2019 with Cristian   Future Office visit:    Next 5 appointments (look out 90 days)    Oct 02, 2019  1:40 PM CDT  SHORT with Marcelino Foster MD  [facility] ([facility]  Arrive at: Clinic [address] [address]  [phone]           Routing refill request to provider for review/approval because:  Drug not on the [facility], [facility] or  Health refill protocol or controlled substance

## 2019-10-02 ENCOUNTER — OFFICE VISIT (OUTPATIENT)
Dept: FAMILY MEDICINE | Facility: CLINIC | Age: 74
End: 2019-10-02
Payer: MEDICARE

## 2019-10-02 VITALS
BODY MASS INDEX: 33.07 KG/M2 | SYSTOLIC BLOOD PRESSURE: 124 MMHG | WEIGHT: 231 LBS | HEART RATE: 77 BPM | TEMPERATURE: 97.7 F | OXYGEN SATURATION: 93 % | DIASTOLIC BLOOD PRESSURE: 78 MMHG | HEIGHT: 70 IN

## 2019-10-02 DIAGNOSIS — I48.91 ATRIAL FIBRILLATION, UNSPECIFIED TYPE (H): ICD-10-CM

## 2019-10-02 DIAGNOSIS — Z23 NEED FOR PROPHYLACTIC VACCINATION AND INOCULATION AGAINST INFLUENZA: ICD-10-CM

## 2019-10-02 DIAGNOSIS — E78.5 HYPERLIPIDEMIA WITH TARGET LDL LESS THAN 100: ICD-10-CM

## 2019-10-02 DIAGNOSIS — E11.40 TYPE 2 DIABETES MELLITUS WITH DIABETIC NEUROPATHY, WITHOUT LONG-TERM CURRENT USE OF INSULIN (H): Primary | ICD-10-CM

## 2019-10-02 PROCEDURE — 90662 IIV NO PRSV INCREASED AG IM: CPT | Performed by: FAMILY MEDICINE

## 2019-10-02 PROCEDURE — G0008 ADMIN INFLUENZA VIRUS VAC: HCPCS | Performed by: FAMILY MEDICINE

## 2019-10-02 PROCEDURE — 99214 OFFICE O/P EST MOD 30 MIN: CPT | Mod: 25 | Performed by: FAMILY MEDICINE

## 2019-10-02 ASSESSMENT — MIFFLIN-ST. JEOR: SCORE: 1804.06

## 2019-10-02 NOTE — PATIENT INSTRUCTIONS
Thank you for choosing [facility].  You may be receiving an email and/or telephone survey request from [facility] Customer Experience regarding your visit today.  Please take a few minutes to respond to the survey to let us know how we are doing.      If you have questions or concerns, please contact us via [employer] or you can contact your care team at [phone].    Our Clinic hours are:  Monday 6:40 am  to 7:00 pm  Tuesday -Friday 6:40 am to 5:00 pm    The [place] outpatient lab hours are:  Monday - Friday 6:10 am to 4:45 pm  Saturdays 7:00 am to 11:00 am  Appointments are required, call [phone]    If you have clinical questions after hours or would like to schedule an appointment,  call the clinic at [phone].      (E11.40) Type 2 diabetes mellitus with diabetic neuropathy, without long-term current use of insulin (H)  (primary encounter diagnosis)  Comment:   Plan: Hemoglobin A1c, Albumin Random Urine         Quantitative with Creat Ratio, ALT, Lipid panel        reflex to direct LDL Fasting, CBC with         platelets differential, Creatinine, Hemoglobin         A1c, TSH, T4 FREE        For the labs use the lower carb diet and do the future A1c test about the end of October. Call [phone] for this. It is not fasting.   Do the other labs in early March of 2020 and you do fast for these. Do them before the appt. Use the lower carb diet and daily exercise.   See the eye doctor annually and do the daily foot care. The meds are refilled for 6 months. The flu shot is done today.     (E78.5) Hyperlipidemia with target LDL less than 100  Comment:   Plan: use the lower chol diet and daily exercise. See above for the labs.    (I48.91) Atrial fibrillation, unspecified type (H)  Comment:   Plan: monitor the pulse and it should be between  per minute. Avoid caffeine and other stimulants. Stay on the same meds.

## 2019-10-02 NOTE — PROGRESS NOTES
Subjective     Stef Bhatt is a 74 year old male who presents to clinic today for the following health issues:    HPI   Diabetes Follow-up      How often are you checking your blood sugar? One time every 7 to 10 days.    What time of day are you checking your blood sugars (select all that apply)?  Will check them when he feels shaky.     Have you had any blood sugars above 200?  Yes, this can be only after 1 1/2 hours after eating.  Usually goes back down to 150.    Have you had any blood sugars below 70?  Yes, can be in the 60's at times.  When he has trouble eating due to his breathing and COPD he will only eat one meal per day.    What symptoms do you notice when your blood sugar is low?  Shaky    What concerns do you have today about your diabetes? None     Do you have any of these symptoms? (Select all that apply)  Numbness in feet and Burning in feet-burning for the last toe of the left foot-states this was checked.     Have you had a diabetic eye exam in the last 12 months? Yes- Date of last eye exam: 5-3-2019.    BP Readings from Last 2 Encounters:   09/03/19 125/80   08/28/19 132/83     Hemoglobin A1C (%)   Date Value   03/26/2019 7.3 (H)   08/22/2018 6.7 (H)     LDL Cholesterol Calculated (mg/dL)   Date Value   03/26/2019 51   09/12/2017 57       Diabetes Management Resources      How many servings of fruits and vegetables do you eat daily?  1-2    On average, how many sweetened beverages do you drink each day (soda, juice, sweet tea, etc)?   Will only have OJ when he feels shaky from the lower blood sugars.    How many days per week do you miss taking your medication? 0, can be once every 6 weeks if he forgets one.        Medication Followup of Peripheral Polyneuropathy     Taking Medication as prescribed: yes    Side Effects:  None    Medication Helping Symptoms:  He was taking the Lyrica along with Tonic Water.  He is now just taking the Lyrica.  The loss of feeling for the feet has not been getting  worse.  He feels this is helping to be stable.         Current Outpatient Medications:      ACCU-CHEK SOFTCLIX [place]ANC[place] [facility] [place] use as directed to test blood sugars up to once daily; pt will call to order, Disp: 100 Each, Rfl: 11     albuterol (VENTOLIN HFA) 108 (90 BASE) MCG/ACT inhaler, Inhale 2 puffs into the lungs every 4 hours as needed Pt will call to order, Disp: 1 Inhaler, Rfl: 11     aspirin 81 MG tablet, Take 81 mg by mouth every evening., Disp: 1 tablet, Rfl: 3     blood glucose (ACCU-CHEK COMPACT DRUM) test strip, Use to test blood sugar three times daily or as directed., Disp: 270 strip, Rfl: 1     fexofenadine (ALLEGRA) 180 MG tablet, Take 180 mg by mouth every evening , Disp: 30 tablet, Rfl: 1     glipiZIDE (GLIPIZIDE XL) 10 MG 24 hr tablet, Take 2 in the am, Disp: 180 tablet, Rfl: 3     glucose blood VI test strips strip, Use as directed to check sugars twice a day, Disp: 3 Month, Rfl: 3     guaiFENesin (MUCINEX) 600 MG 12 hr tablet, Take 400-600 mg by mouth 2 times daily , Disp: , Rfl:      ipratropium - albuterol 0.5 mg/2.5 mg/3 mL (DUONEB) 0.5-2.5 (3) MG/3ML nebulization, Take 1 vial by nebulization every 6 hours as needed for shortness of breath / dyspnea or wheezing, Disp: , Rfl:      losartan (COZAAR) 50 MG tablet, Take 1 tablet (50 mg) by mouth daily, Disp: 90 tablet, Rfl: 3     metoprolol succinate ER (TOPROL-XL) 50 MG 24 hr tablet, 100 mg in am, 50 mg in pm, Disp: 270 tablet, Rfl: 3     metroNIDAZOLE (METROCREAM) 0.75 % external cream, Apply topically 2 times daily, Disp: 45 g, Rfl: 3     nitroGLYcerin (NITROSTAT) 0.4 MG sublingual tablet, Place 1 tablet (0.4 mg) under the tongue every 5 minutes as needed for chest pain, Disp: 25 tablet, Rfl: 11     order for DME, Equipment being ordered: 1 pair Diabetic shoes, Disp: 1 Device, Rfl: 0     order for DME, Equipment being ordered: neoprene large hinged knee brace, Disp: , Rfl:      predniSONE (DELTASONE) 10 MG tablet, Take 10 mg by mouth daily,  "Disp: , Rfl:      pregabalin (LYRICA) 50 MG capsule, Take 2 Capsules EVERY MORNING AND Take 2 Capsules EVERY EVENING, Disp: 120 capsule, Rfl: 3     simvastatin (ZOCOR) 20 MG tablet, Take 1 tablet (20 mg) by mouth daily, Disp: 90 tablet, Rfl: 3     umeclidinium-vilanterol (ANORO ELLIPTA) 62.5-25 MCG/INH oral inhaler, Inhale 1 puff into the lungs daily, Disp: , Rfl:      warfarin (COUMADIN) 2 MG tablet, Take 2 mg Mon & Fri; 4 mg rest of the week or as directed by Anticoagulation Clinic., Disp: 150 tablet, Rfl: 3     ascorbic acid (VITAMIN C) 1000 MG TABS, Take 1,000 mg by mouth daily In winter only, Disp: , Rfl:      diphenhydrAMINE-acetaminophen (TYLENOL PM EXTRA STRENGTH)  MG tablet, Take 1 tablet by mouth nightly as needed for sleep, Disp: , Rfl:      ketoconazole (NIZORAL) 2 % shampoo, Apply daily then rinse. Pt will call to order, Disp: 120 mL, Rfl: 11    Patient Active Problem List   Diagnosis     Coronary atherosclerosis     Essential hypertension     Hemorrhage of rectum and anus     Allergic Rhinitis     AK (actinic keratosis)     HYPERLIPIDEMIA LDL GOAL <100     Ventral hernia     Colon polyp     Dysphonia     Senile nuclear sclerosis     Acute myocardial infarction of other specified sites, episode of care unspecified     Health Care Home     Atrial fibrillation (H)     Advance Care Planning     Edge's neuroma     COPD (chronic obstructive pulmonary disease) (H)     Long term current use of anticoagulant therapy     Hyperlipidemia with target LDL less than 100     Morbid obesity (H)     Type 2 diabetes mellitus with diabetic neuropathy (H)     Peripheral polyneuropathy     Chronic bronchitis, unspecified chronic bronchitis type (H)     DJD (degenerative joint disease), cervical     Benign neoplasm of skin of trunk, except scrotum     Decubitus ulcer of buttock, stage 1, unspecified laterality       Pulse 77, temperature 97.7  F (36.5  C), temperature source Tympanic, height 1.778 m (5' 10\"), weight " 104.8 kg (231 lb), SpO2 93 %.    Exam:  GENERAL APPEARANCE: healthy, alert and no distress  EYES: EOMI,  PERRL  NECK: no adenopathy, no asymmetry, masses, or scars and thyroid normal to palpation  RESP: lungs clear to auscultation - no rales, rhonchi or wheezes  CV: regular rates and rhythm, normal S1 S2, no S3 or S4 and no murmur, click or rub -  SKIN: no suspicious lesions or rashes  PSYCH: mentation appears normal and affect normal/bright    no trophic changes or ulcerative lesions; decreased pulses and the left foot has decreased sensation on the plantar surface.   The right foot has decreased sensation only on the underside of the toes.       (E11.40) Type 2 diabetes mellitus with diabetic neuropathy, without long-term current use of insulin (H)  (primary encounter diagnosis)  Comment:   Plan: Hemoglobin A1c, Albumin Random Urine         Quantitative with Creat Ratio, ALT, Lipid panel        reflex to direct LDL Fasting, CBC with         platelets differential, Creatinine, Hemoglobin         A1c, TSH, T4 FREE        For the labs use the lower carb diet and do the future A1c test about the end of October. Call [phone] for this. It is not fasting.   Do the other labs in early March of 2020 and you do fast for these. Do them before the appt. Use the lower carb diet and daily exercise.   See the eye doctor annually and do the daily foot care. The meds are refilled for 6 months. The flu shot is done today.     (E78.5) Hyperlipidemia with target LDL less than 100  Comment:   Plan: use the lower chol diet and daily exercise. See above for the labs.    (I48.91) Atrial fibrillation, unspecified type (H)  Comment:   Plan: monitor the pulse and it should be between  per minute. Avoid caffeine and other stimulants. Stay on the same meds.         Marcelino Foster MD

## 2019-10-24 ENCOUNTER — ANTICOAGULATION THERAPY VISIT (OUTPATIENT)
Dept: ANTICOAGULATION | Facility: CLINIC | Age: 74
End: 2019-10-24
Payer: MEDICARE

## 2019-10-24 DIAGNOSIS — Z79.01 LONG TERM CURRENT USE OF ANTICOAGULANT THERAPY: ICD-10-CM

## 2019-10-24 DIAGNOSIS — I48.91 ATRIAL FIBRILLATION, UNSPECIFIED TYPE (H): ICD-10-CM

## 2019-10-24 LAB — INR POINT OF CARE: 2.5 (ref 0.86–1.14)

## 2019-10-24 PROCEDURE — 99207 ZZC NO CHARGE NURSE ONLY: CPT

## 2019-10-24 PROCEDURE — 36416 COLLJ CAPILLARY BLOOD SPEC: CPT

## 2019-10-24 PROCEDURE — 85610 PROTHROMBIN TIME: CPT | Mod: QW

## 2019-10-24 NOTE — PROGRESS NOTES
19    Patient was in the ED on 19 love SOB. INR 2.6. Patient was given two doses of Lasix.     Bindu Callejas RN, BSN, PHN  Anticoagulation Clinic   [phone]        ANTICOAGULATION FOLLOW-UP CLINIC VISIT    Patient Name:  Stef Bhatt  Date:  10/24/2019  Contact Type:  Face to Face    SUBJECTIVE:  Patient Findings     Comments:   No changes in medications, activity, or diet noted. No concerns with clotting, bleeding, or increased bruising noted. Took warfarin as prescribed.  Pt did take prednisone a few weeks ago.  Patient is to continue maintenance warfarin plan, and check INR in 8 weeks (due to the holidays).  Patient verbalizes understanding and agrees to plan. No further questions or concerns.        Clinical Outcomes     Negatives:   Major bleeding event, Thromboembolic event, Anticoagulation-related hospital admission, Anticoagulation-related ED visit, Anticoagulation-related fatality    Comments:   No changes in medications, activity, or diet noted. No concerns with clotting, bleeding, or increased bruising noted. Took warfarin as prescribed.  Pt did take prednisone a few weeks ago.  Patient is to continue maintenance warfarin plan, and check INR in 8 weeks (due to the holidays).  Patient verbalizes understanding and agrees to plan. No further questions or concerns.           OBJECTIVE    INR Protime   Date Value Ref Range Status   10/24/2019 2.5 (A) 0.86 - 1.14 Final       ASSESSMENT / PLAN  INR assessment THER    Recheck INR In: 8 WEEKS    INR Location Clinic      Anticoagulation Summary  As of 10/24/2019    INR goal:   2.0-3.0   TTR:   88.4 % (4.5 y)   INR used for dosin.5 (10/24/2019)   Warfarin maintenance plan:   2 mg (2 mg x 1) every Mon, Fri; 4 mg (2 mg x 2) all other days   Full warfarin instructions:   2 mg every Mon, Fri; 4 mg all other days   Weekly warfarin total:   24 mg   No change documented:   Gloria Arboleda RN   Plan last modified:   Noelle Harrington RN  (4/6/2015)   Next INR check:   12/12/2019   Priority:   INR   Target end date:   Indefinite    Indications    Atrial fibrillation (H) [I48.91]  Long term current use of anticoagulant therapy [Z79.01]             Anticoagulation Episode Summary     INR check location:       Preferred lab:       Send INR reminders to:   KAROLINA NINO    Comments:   * warfarin 2 mg tabs. Dr Shelley Lennox is pulmonologist. 10/18/18 okay for 9 week rechecks per Dr. Foster      Anticoagulation Care Providers     Provider Role Specialty Phone number    Marcelino Foster MD [facility] [phone]            See the Encounter Report to view Anticoagulation Flowsheet and Dosing Calendar (Go to Encounters tab in chart review, and find the Anticoagulation Therapy Visit)        Gloria Arboleda RN

## 2019-11-15 DIAGNOSIS — J44.9 CHRONIC OBSTRUCTIVE PULMONARY DISEASE (COPD) (H): Primary | ICD-10-CM

## 2019-11-18 ENCOUNTER — HOSPITAL ENCOUNTER (OUTPATIENT)
Dept: CT IMAGING | Facility: CLINIC | Age: 74
Discharge: HOME | End: 2019-11-18
Attending: INTERNAL MEDICINE | Admitting: INTERNAL MEDICINE
Payer: MEDICARE

## 2019-11-18 ENCOUNTER — APPOINTMENT (OUTPATIENT)
Dept: CT IMAGING | Facility: CLINIC | Age: 74
End: 2019-11-18
Attending: EMERGENCY MEDICINE
Payer: MEDICARE

## 2019-11-18 ENCOUNTER — HOSPITAL ENCOUNTER (EMERGENCY)
Facility: CLINIC | Age: 74
Discharge: HOME | End: 2019-11-18
Attending: EMERGENCY MEDICINE | Admitting: EMERGENCY MEDICINE
Payer: MEDICARE

## 2019-11-18 VITALS
OXYGEN SATURATION: 94 % | HEART RATE: 73 BPM | DIASTOLIC BLOOD PRESSURE: 97 MMHG | RESPIRATION RATE: 11 BRPM | HEIGHT: 70 IN | TEMPERATURE: 96.8 F | WEIGHT: 225 LBS | SYSTOLIC BLOOD PRESSURE: 156 MMHG | BODY MASS INDEX: 32.21 KG/M2

## 2019-11-18 DIAGNOSIS — I50.9 CONGESTIVE HEART FAILURE, UNSPECIFIED HF CHRONICITY, UNSPECIFIED HEART FAILURE TYPE (H): ICD-10-CM

## 2019-11-18 DIAGNOSIS — R51.9 NONINTRACTABLE HEADACHE, UNSPECIFIED CHRONICITY PATTERN, UNSPECIFIED HEADACHE TYPE: ICD-10-CM

## 2019-11-18 DIAGNOSIS — R06.00 DYSPNEA, UNSPECIFIED TYPE: ICD-10-CM

## 2019-11-18 DIAGNOSIS — R79.89 ELEVATED TROPONIN: ICD-10-CM

## 2019-11-18 DIAGNOSIS — J44.9 CHRONIC OBSTRUCTIVE PULMONARY DISEASE, UNSPECIFIED COPD TYPE (H): ICD-10-CM

## 2019-11-18 DIAGNOSIS — J32.0 MAXILLARY SINUSITIS, UNSPECIFIED CHRONICITY: ICD-10-CM

## 2019-11-18 DIAGNOSIS — D75.1 POLYCYTHEMIA: ICD-10-CM

## 2019-11-18 DIAGNOSIS — R05.9 COUGH: ICD-10-CM

## 2019-11-18 LAB
ALBUMIN SERPL-MCNC: 3.4 G/DL (ref 3.4–5)
ALP SERPL-CCNC: 108 U/L (ref 40–150)
ALT SERPL W P-5'-P-CCNC: 30 U/L (ref 0–70)
ANION GAP SERPL CALCULATED.3IONS-SCNC: 5 MMOL/L (ref 3–14)
AST SERPL W P-5'-P-CCNC: 24 U/L (ref 0–45)
BASE DEFICIT BLDV-SCNC: 0.6 MMOL/L
BASOPHILS # BLD AUTO: 0.1 10E9/L (ref 0–0.2)
BASOPHILS NFR BLD AUTO: 0.8 %
BILIRUB SERPL-MCNC: 0.9 MG/DL (ref 0.2–1.3)
BUN SERPL-MCNC: 23 MG/DL (ref 7–30)
CALCIUM SERPL-MCNC: 8.7 MG/DL (ref 8.5–10.1)
CHLORIDE SERPL-SCNC: 110 MMOL/L (ref 94–109)
CO2 SERPL-SCNC: 26 MMOL/L (ref 20–32)
CREAT SERPL-MCNC: 1.26 MG/DL (ref 0.66–1.25)
D DIMER PPP FEU-MCNC: 0.3 UG/ML FEU (ref 0–0.5)
DIFFERENTIAL METHOD BLD: ABNORMAL
EOSINOPHIL # BLD AUTO: 0.1 10E9/L (ref 0–0.7)
EOSINOPHIL NFR BLD AUTO: 1.2 %
ERYTHROCYTE [DISTWIDTH] IN BLOOD BY AUTOMATED COUNT: 14.5 % (ref 10–15)
GFR SERPL CREATININE-BSD FRML MDRD: 56 ML/MIN/{1.73_M2}
GLUCOSE SERPL-MCNC: 182 MG/DL (ref 70–99)
HCO3 BLDV-SCNC: 25 MMOL/L (ref 21–28)
HCT VFR BLD AUTO: 55 % (ref 40–53)
HGB BLD-MCNC: 18.2 G/DL (ref 13.3–17.7)
IMM GRANULOCYTES # BLD: 0.1 10E9/L (ref 0–0.4)
IMM GRANULOCYTES NFR BLD: 0.5 %
INR PPP: 2.6 (ref 0.86–1.14)
LYMPHOCYTES # BLD AUTO: 1 10E9/L (ref 0.8–5.3)
LYMPHOCYTES NFR BLD AUTO: 10.3 %
MCH RBC QN AUTO: 32.2 PG (ref 26.5–33)
MCHC RBC AUTO-ENTMCNC: 33.1 G/DL (ref 31.5–36.5)
MCV RBC AUTO: 97 FL (ref 78–100)
MONOCYTES # BLD AUTO: 0.7 10E9/L (ref 0–1.3)
MONOCYTES NFR BLD AUTO: 7.8 %
NEUTROPHILS # BLD AUTO: 7.5 10E9/L (ref 1.6–8.3)
NEUTROPHILS NFR BLD AUTO: 79.4 %
NRBC # BLD AUTO: 0 10*3/UL
NRBC BLD AUTO-RTO: 0 /100
NT-PROBNP SERPL-MCNC: 1844 PG/ML (ref 0–900)
O2/TOTAL GAS SETTING VFR VENT: NORMAL %
PCO2 BLDV: 42 MM HG (ref 40–50)
PH BLDV: 7.38 PH (ref 7.32–7.43)
PLATELET # BLD AUTO: 151 10E9/L (ref 150–450)
PO2 BLDV: 46 MM HG (ref 25–47)
POTASSIUM SERPL-SCNC: 4.8 MMOL/L (ref 3.4–5.3)
PROT SERPL-MCNC: 7.5 G/DL (ref 6.8–8.8)
RBC # BLD AUTO: 5.65 10E12/L (ref 4.4–5.9)
SODIUM SERPL-SCNC: 141 MMOL/L (ref 133–144)
TROPONIN I SERPL-MCNC: 0.03 UG/L (ref 0–0.04)
TROPONIN I SERPL-MCNC: 0.05 UG/L (ref 0–0.04)
TROPONIN I SERPL-MCNC: <0.015 UG/L (ref 0–0.04)
WBC # BLD AUTO: 9.5 10E9/L (ref 4–11)

## 2019-11-18 PROCEDURE — 83880 ASSAY OF NATRIURETIC PEPTIDE: CPT | Performed by: EMERGENCY MEDICINE

## 2019-11-18 PROCEDURE — 25000125 ZZHC RX 250: Performed by: EMERGENCY MEDICINE

## 2019-11-18 PROCEDURE — 99285 EMERGENCY DEPT VISIT HI MDM: CPT | Mod: 25 | Performed by: EMERGENCY MEDICINE

## 2019-11-18 PROCEDURE — 70450 CT HEAD/BRAIN W/O DYE: CPT

## 2019-11-18 PROCEDURE — 80053 COMPREHEN METABOLIC PANEL: CPT | Performed by: EMERGENCY MEDICINE

## 2019-11-18 PROCEDURE — 96374 THER/PROPH/DIAG INJ IV PUSH: CPT

## 2019-11-18 PROCEDURE — 82803 BLOOD GASES ANY COMBINATION: CPT | Performed by: EMERGENCY MEDICINE

## 2019-11-18 PROCEDURE — 93010 ELECTROCARDIOGRAM REPORT: CPT | Mod: Z6 | Performed by: EMERGENCY MEDICINE

## 2019-11-18 PROCEDURE — 84484 ASSAY OF TROPONIN QUANT: CPT | Performed by: EMERGENCY MEDICINE

## 2019-11-18 PROCEDURE — 94640 AIRWAY INHALATION TREATMENT: CPT

## 2019-11-18 PROCEDURE — 25000128 H RX IP 250 OP 636: Performed by: EMERGENCY MEDICINE

## 2019-11-18 PROCEDURE — 99284 EMERGENCY DEPT VISIT MOD MDM: CPT | Mod: 25

## 2019-11-18 PROCEDURE — 71250 CT THORAX DX C-: CPT

## 2019-11-18 PROCEDURE — 93005 ELECTROCARDIOGRAM TRACING: CPT

## 2019-11-18 PROCEDURE — 85025 COMPLETE CBC W/AUTO DIFF WBC: CPT | Performed by: EMERGENCY MEDICINE

## 2019-11-18 PROCEDURE — 85379 FIBRIN DEGRADATION QUANT: CPT | Performed by: EMERGENCY MEDICINE

## 2019-11-18 PROCEDURE — 85610 PROTHROMBIN TIME: CPT | Performed by: EMERGENCY MEDICINE

## 2019-11-18 RX ORDER — IPRATROPIUM BROMIDE AND ALBUTEROL SULFATE 2.5; .5 MG/3ML; MG/3ML
3 SOLUTION RESPIRATORY (INHALATION) ONCE
Status: COMPLETED | OUTPATIENT
Start: 2019-11-18 | End: 2019-11-18

## 2019-11-18 RX ORDER — FUROSEMIDE 20 MG
20 TABLET ORAL DAILY
Qty: 2 TABLET | Refills: 0 | Status: SHIPPED | OUTPATIENT
Start: 2019-11-19

## 2019-11-18 RX ORDER — FUROSEMIDE 10 MG/ML
20 INJECTION INTRAMUSCULAR; INTRAVENOUS ONCE
Status: COMPLETED | OUTPATIENT
Start: 2019-11-18 | End: 2019-11-18

## 2019-11-18 RX ADMIN — FUROSEMIDE 20 MG: 10 INJECTION, SOLUTION INTRAMUSCULAR; INTRAVENOUS at 18:12

## 2019-11-18 RX ADMIN — IPRATROPIUM BROMIDE AND ALBUTEROL SULFATE 3 ML: .5; 3 SOLUTION RESPIRATORY (INHALATION) at 16:48

## 2019-11-18 ASSESSMENT — MIFFLIN-ST. JEOR: SCORE: 1776.84

## 2019-11-18 NOTE — ED AVS SNAPSHOT
[facility] Emergency Department  [address] [address]  Phone:  [phone]  Fax:  [phone]                                    Stef Bhatt   MRN: [id number]    Department:  [facility] Emergency Department   Date of Visit:  11/18/2019           After Visit Summary Signature Page    I have received my discharge instructions, and my questions have been answered. I have discussed any challenges I see with this plan with the nurse or doctor.    ..........................................................................................................................................  Patient/Patient Representative Signature      ..........................................................................................................................................  Patient Representative Print Name and Relationship to Patient    ..................................................               ................................................  Date                                   Time    ..........................................................................................................................................  Reviewed by Signature/Title    ...................................................              ..............................................  Date                                               Time          22EPIC Rev 08/18

## 2019-11-18 NOTE — ED PROVIDER NOTES
History     Chief Complaint   Patient presents with     Shortness of Breath     has COPD      HPI  Stef Bhatt is a 74 year old male with a history significant for coronary atherosclerosis, COPD, atrial fibrillation, type 2 diabetes mellitus with diabetic neuropathy, acute myocardial infarction, hypertension and is long term current use of anticoagulant therapy. He presents to the ED for evaluation of shortness of breath. The patient reports that his symptoms began 6 to 7 months ago, when he began feeling more short of breath then his base line. His shortness of breath has progressively gotten worse, and now he reports that he is unable to walk even short distances before having to stop. The patient states that around the same time his shortness of breath became worse he began experiencing episodes of vertigo, lightheadedness and headaches. He also states that he has had some edema in his left foot, and chest pain within the last 6 weeks. He denies history of heart failure, nausea and emesis.  He is currently not having any chest pain when he lies here he has no significant shortness of breath.  Patient reports he saw his pulmonary doctor several weeks ago at the [facility] and reports that his pulmonary function tests did actually improved from previous.  He states they set him up with a CT scan of the chest which was done today.  The CT results revealed:       FINDINGS: There are a few small noncalcified stable nodules in the  right lung from 2013, suggesting a benign entity. The largest one  measures 5 mm on axial image 39. Chronic atelectasis is seen posterior  at the left lung base. Subcentimeter in short axis dimension  mediastinal lymph nodes are not discretely enlarged by CT criteria.  The thoracic aorta is normal in caliber. A large amount of coronary  artery calcification is again seen. There is a very small unchanged  chronic left pleural effusion. The upper visualized portions of  the  abdomen show an upper right paramidline fat-containing ventral hernia  on axial image 59, also present previously.                                                                      IMPRESSION:  Stable chronic findings, as described above. No acute  intrathoracic process demonstrated on this non-IV enhanced study.    He currently denies any dizziness or headache.  He has not had any visual changes.  He denies any abdominal pain.  He has not had any urinary symptoms.  He denies any bowel or bladder dysfunction.  He is not any focal numbness weakness in extremity.  He denies recent rash.  Allergies:  Allergies   Allergen Reactions     Sulfa Drugs Rash     Rash on chest     Versed Other (See Comments)     agitation     Captopril Fatigue     dizziness     Cimetidine Other (See Comments)     Tagamet - reaction unknown     Lisinopril Other (See Comments) and Fatigue     dizziness       Problem List:    Patient Active Problem List    Diagnosis Date Noted     Decubitus ulcer of buttock, stage 1, unspecified laterality 08/03/2017     Priority: Medium     DJD (degenerative joint disease), cervical 12/19/2016     Priority: Medium     Benign neoplasm of skin of trunk, except scrotum 12/19/2016     Priority: Medium     Chronic bronchitis, unspecified chronic bronchitis type (H) 12/02/2016     Priority: Medium     Peripheral polyneuropathy 12/30/2015     Priority: Medium     Morbid obesity (H) 10/24/2015     Priority: Medium     Type 2 diabetes mellitus with diabetic neuropathy (H) 10/24/2015     Priority: Medium     Metformin caused diarrhea.        Hyperlipidemia with target LDL less than 100 10/22/2015     Priority: Medium     Diagnosis updated by automated process. Provider to review and confirm.       Long term current use of anticoagulant therapy 04/06/2015     Priority: Medium     Problem list name updated by automated process. Provider to review       COPD (chronic obstructive pulmonary disease) (H) 01/28/2014      Priority: Medium     Intolerant of Advair; it caused a cough.        Minesh's neuroma 08/28/2013     Priority: Medium     Advance Care Planning 10/11/2012     Priority: Medium     Advance Care Planning: Receipt of ACP document:  Received: Health Care Directive which was witnessed or notarized on 5/22/14.  Document not previously scanned.  Validation form completed and sent with document to be scanned.  Code Status reflects choices in most recent ACP document.  Confirmed/documented designated decision maker(s). See permanent comments section of demographics in clinical tab. View document(s) and details by clicking on code status. Added by Camelia Haddad on 6/10/2014.               Atrial fibrillation (H) 02/08/2012     Priority: Medium     Acute myocardial infarction of other specified sites, episode of care unspecified 01/20/2012     Priority: Medium     EMERGENCY CARE PLAN  Presenting Problem Signs and Symptoms Treatment Plan    Questions or conerns during clinic hours    I will call the clinic directly     Questions or conerns outside clinic hours    I will call the 24 hour nurse line at [phone]    Patient needs to schedule an appointment    I will call the 24 hour scheduling team at [phone] or clinic directly    Same day treatment     I will call the clinic first, nurse line if after hours, urgent care and express care if needed                 Sandhya Zaragoza RN  Care Coordinator                [facility] [place] 01/20/2012     Priority: Medium     EMERGENCY CARE PLAN  Presenting Problem Signs and Symptoms Treatment Plan    Questions or conerns during clinic hours    I will call the clinic directly     Questions or conerns outside clinic hours    I will call the 24 hour nurse line at [phone]    Patient needs to schedule an appointment    I will call the 24 hour scheduling team at [phone] or clinic directly    Same day treatment     I will call the clinic first, nurse line if after hours, urgent  care and express care if needed                 Sandhya Zaragoza RN  Care Coordinator           DX V65.8 REPLACED WITH 78424 [facility] CARE [facility] (04/08/2013)       Senile nuclear sclerosis 01/18/2012     Priority: Medium     Dysphonia 09/23/2011     Priority: Medium     Colon polyp 07/07/2011     Priority: Medium     (Problem list name updated by automated process. Provider to review and confirm.)       Ventral hernia 01/12/2011     Priority: Medium     (Problem list name updated by automated process. Provider to review and confirm.)       HYPERLIPIDEMIA LDL GOAL <100 10/31/2010     Priority: Medium     AK (actinic keratosis) 06/15/2009     Priority: Medium     Allergic Rhinitis 09/17/2008     Priority: Medium     Hemorrhage of rectum and anus 12/03/2007     Priority: Medium     Essential hypertension 06/07/2007     Priority: Medium     Problem list name updated by automated process. Provider to review       Coronary atherosclerosis 12/04/2006     Priority: Medium     Problem list name updated by automated process. 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     Obesity, unspecified      Pure hypercholesterolemia      Tobacco use disorder      Trigger finger (acquired)      Type II or unspecified type diabetes mellitus without mention of complication, not stated as uncontrolled        Past Surgical History:    Past Surgical History:   Procedure Laterality Date     COLONOSCOPY  1/27/2011    COMBINED COLONOSCOPY, REMOVE TUMOR/POLYP/LESION BY SNARE performed by MARIUSZ ELIZALDE at [facility]     CORONARY ARTERY BYPASS  dec 2006     FLEXIBLE SIGMOIDOSCOPY  7/15/05     PHACOEMULSIFICATION WITH STANDARD INTRAOCULAR LENS IMPLANT  1/19/2012    Procedure:PHACOEMULSIFICATION WITH STANDARD INTRAOCULAR LENS IMPLANT; Right Anabel  Phacoemulsification with introcular lens implant ; Surgeon:ROBY DORADO; Location:[facility]     PHACOEMULSIFICATION WITH STANDARD INTRAOCULAR LENS IMPLANT  2012    Procedure:PHACOEMULSIFICATION WITH STANDARD INTRAOCULAR LENS IMPLANT; Left Kelman Phacoemulsification with introcular lens implant ; Surgeon:ROBY DORADO; Location:[facility]     SURGICAL HISTORY OF -       chay     SURGICAL HISTORY OF -       choledochocystotomy     SURGICAL HISTORY OF -       lumbar     SURGICAL HISTORY OF -       colonoscopy       Family History:    Family History   Problem Relation Age of Onset     Diabetes Brother      Breast Cancer Sister      Cancer Sister         lymph nodes     Heart Disease Father        Social History:  Marital Status:   [2]  Social History     Tobacco Use     Smoking status: Former Smoker     Packs/day: 1.00     Types: Cigarettes, Cigars     Last attempt to quit: 2006     Years since quittin.8     Smokeless tobacco: Never Used     Tobacco comment: cigar once in a blue moon-quit again about 6 weeks ago   Substance Use Topics     Alcohol use: Yes     Comment: occasional     Drug use: No        Medications:    ACCU-CHEK SOFTCLIX LANCETS MISC  albuterol (VENTOLIN HFA) 108 (90 BASE) MCG/ACT inhaler  ascorbic acid (VITAMIN C) 1000 MG TABS  aspirin 81 MG tablet  blood glucose (ACCU-CHEK COMPACT DRUM) test strip  diphenhydrAMINE-acetaminophen (TYLENOL PM EXTRA STRENGTH)  MG tablet  fexofenadine (ALLEGRA) 180 MG tablet  glipiZIDE (GLIPIZIDE XL) 10 MG 24 hr tablet  glucose blood VI test strips strip  guaiFENesin (MUCINEX) 600 MG 12 hr tablet  ipratropium - albuterol 0.5 mg/2.5 mg/3 mL (DUONEB) 0.5-2.5 (3) MG/3ML nebulization  ketoconazole (NIZORAL) 2 % shampoo  losartan (COZAAR) 50 MG tablet  metoprolol succinate ER (TOPROL-XL) 50 MG 24 hr tablet  metroNIDAZOLE (METROCREAM) 0.75 % external cream  nitroGLYcerin (NITROSTAT) 0.4 MG sublingual tablet  order for DME  order for  "DME  predniSONE (DELTASONE) 10 MG tablet  pregabalin (LYRICA) 50 MG capsule  simvastatin (ZOCOR) 20 MG tablet  umeclidinium-vilanterol (ANORO ELLIPTA) 62.5-25 MCG/INH oral inhaler  warfarin (COUMADIN) 2 MG tablet          Review of Systems  All systems reviewed and other than pertinent positives and all other systems are negative.  Physical Exam   BP: 133/81  Heart Rate: 87  Temp: 96.8  F (36  C)  Resp: 16  Height: 177.8 cm (5' 10\")  Weight: 102.1 kg (225 lb)  SpO2: 94 %      Physical Exam   HENT: Oral mucosa moist. No lesions.  Neck: Supple  Pulmonary/Chest: Lungs have crackles at bases bilateraly left greater than right.  Faint wheeze in upper lung fields.   Cardiovascular: Heart with irregular rhythm regular rate.  No obvious murmurs heard.  Abdomen: Soft, non-distended, non-tender.   Musculoskeletal: Moving all extremities well. No peripheral edema, venus status changes left greater than right.  No calf tenderness.  Pulses symmetrical.  Neurological: Alert. No focal neurologic deficit.   Skin: No rash.      ED Course        Procedures               EKG Interpretation:      Interpreted by Marcelino Da Silva MD  Rhythm: atrial fibrillation - controlled  Rate: Normal  Axis: Normal  Ectopy: none  Conduction: normal  ST Segments/ T Waves: T wave flattening with some t wave inversion  Q Waves: none  Comparison to prior: flattening and some twave inversion since last in 2014    Clinical Impression: nsr with non specific st twave abnormality.                Critical Care time:  none               Results for orders placed or performed during the hospital encounter of 11/18/19 (from the past 24 hour(s))   CT Chest w/o Contrast    Narrative    CT CHEST W/O CONTRAST 11/18/2019 2:30 PM    HISTORY: Difficulty breathing. COPD.    TECHNIQUE: CT of the chest is performed without IV contrast.    Assessed structures to the limits no IV contrast administration  include the lungs, mediastinum, pleura, and chest wall.    Radiation " exposure is reduced using automated exposure control,  adjustment of the mA and/or kV according to patient size, or iterative  reconstruction technique.    COMPARISON: 12/11/2013.    FINDINGS: There are a few small noncalcified stable nodules in the  right lung from 2013, suggesting a benign entity. The largest one  measures 5 mm on axial image 39. Chronic atelectasis is seen posterior  at the left lung base. Subcentimeter in short axis dimension  mediastinal lymph nodes are not discretely enlarged by CT criteria.  The thoracic aorta is normal in caliber. A large amount of coronary  artery calcification is again seen. There is a very small unchanged  chronic left pleural effusion. The upper visualized portions of the  abdomen show an upper right paramidline fat-containing ventral hernia  on axial image 59, also present previously.      Impression    IMPRESSION:  Stable chronic findings, as described above. No acute  intrathoracic process demonstrated on this non-IV enhanced study.    LU ANDRADE MD       Medications - No data to display     16:03 patient assessed. Course of care outlined.     Assessments & Plan (with Medical Decision Making) records were reviewed.  Labs were obtained.  Patient was given a DuoNeb due to the faint wheezing.  Records were reviewed.  Labs were obtained.  I reviewed the patient's CT findings with him.  CT scan of the head was obtained due to his statement of headaches.  This revealed no obvious acute abnormality.  EKG revealed atrial fibrillation rate controlled with nonspecific ST-T wave changes.  There is increased T wave inversion compared to previous EKG in 2014.  Patient's white count was 9.5 hemoglobin elevated at 18.2.  Last hemoglobin was 17 and this is been recently trending upwards over the past few years.  Hematocrit 55.  Platelet count 151.  Comprehensive metabolic panel significant for glucose elevated 182 creatinine was slightly increased at 1.26.  Troponin was within  normal limits.  Patient's d-dimer was within normal limits.  INR was 2.6 and his proBNP was elevated at 1844.  This is increased from his previous test done in 2014.  Due to the crackles at the bases of his lungs I did give the patient a dose of Lasix.  Findings were discussed in detail with patient.  He has been satting in the 90s and has a unchanged CT scan of the chest.  I felt a repeat troponin was warranted.  Unfortunately this was initially added to his and add on and therefore there was a delay in getting a second 1 done.  Results came back as 0.028.  Repeat EKG was unchanged from the previous.  At this time I feel I need to trend another troponin and if this is increasing I will need to admit him either at our hospital or other cardiac services due to ekg changes and elevetion of troponin..  If it is decreasing I feel he could be worked up  as an outpatient with a outpatient echo and a few doses of Lasix with close follow-up with us primary care(Thursday morning), pulmonologist and cardiologist.  Patient will be signed out for follow-up of the troponin.  He was in agreement with this.He will need to be followed up for his elevated hemoglobin also.  Patient was signed out to Dr. Gasca for further evaluation and care. I did again discuss with patient my plan that if his troponin increased we will need to admit him to a hospital for further evaluation and care.Patient and his wife understand this.      I have reviewed the nursing notes.    I have reviewed the findings, diagnosis, plan and need for follow up with the patient.       New Prescriptions    No medications on file       Final diagnoses:   Dyspnea, unspecified type   Congestive heart failure, unspecified HF chronicity, unspecified heart failure type (H) - possible early   Nonintractable headache, unspecified chronicity pattern, unspecified headache type   Polycythemia - possible   This document serves as a record of services personally performed by  Marcelino Da Silva MD. It was created on their behalf by Nicole Joyner, a trained medical scribe. The creation of this record is based on the provider's personal observations and the statements of the patient. This document has been checked and approved by the attending provider.      11/18/2019   [facility] EMERGENCY DEPARTMENT     Marcelino Da Silva MD  11/19/19 0827

## 2019-11-18 NOTE — ED NOTES
Has had COPD for years and the past 6 months it seems to have been getting worse. He was seen last week and had pulmonary function testing done and was told he needed a CT to r/o PE. Recently on prednisone. He also c/o headaches that he has had for the past 6 months. He is a/o x 4. No resp distress noted. Is not on home 02

## 2019-11-19 ENCOUNTER — TELEPHONE (OUTPATIENT)
Dept: ANTICOAGULATION | Facility: CLINIC | Age: 74
End: 2019-11-19

## 2019-11-19 ENCOUNTER — TELEPHONE (OUTPATIENT)
Dept: CARDIOLOGY | Facility: CLINIC | Age: 74
End: 2019-11-19

## 2019-11-19 NOTE — ED PROVIDER NOTES
Emergency Department Patient Sign-out       Brief HPI:  This is a 74 year old male signed out to me by Dr. SRINIVAS Da Silva at shift change at 9.55pm .  See Dr Da Silva ED note for details of the presentation, ED course, work-up, plan of care at sign out.  Plan of care at signout is disposition based on third troponin result.  Initial troponin was within normal limits, troponin #2 was slightly elevated but still within normal range at 0.028.  Third troponin pending.  If troponin is within normal range discharge to home.  Working diagnosis likely CHF with elevated BNP.  Cardiac rule out completed due to subtle T wave changes with rate controlled atrial fibrillation.  Patient is on chronic anticoagulation.          Significant Events after my assuming care: I was notified by nursing at 10:40 PM the patient was eager to go home.  Patient was seen and examined.  I reviewed plan of care and handoff with the patient and spouse as discussed by .  INR was 2.6, d-dimer was 0.3, BNP was 1844 head imaging interpretation report by the radiologist reviewed see detailed below.  Troponin #3 resulted with 0.052, with significant delta.  I reviewed patient's resting troponins and discussed the possibility that he may have a non-ST elevation MI.  After reviewing risk and benefits of admission for serial troponins and further assessment by cardiology patient is adamant he does not want to be transferred.  Outpatient appointment scheduled by Dr Da Silva, with follow-up stress testing pending.  Patient and spouse expressed understanding of the risk of going home without additional testing and interventions.      Exam:   Patient Vitals for the past 24 hrs:   BP Temp Temp src Pulse Heart Rate Resp SpO2 Height Weight   11/18/19 2125 -- -- -- -- 74 12 95 % -- --   11/18/19 2120 129/85 -- -- 74 -- -- -- -- --   11/18/19 2110 -- -- -- -- -- -- 95 % -- --   11/18/19 2105 -- -- -- -- -- -- 96 % -- --   11/18/19 2050 -- -- -- -- -- -- 97 % -- --  "  11/18/19 2045 -- -- -- -- -- -- 99 % -- --   11/18/19 2030 -- -- -- 88 -- -- -- -- --   11/18/19 2015 -- -- -- -- -- -- 93 % -- --   11/18/19 2000 -- -- -- 82 -- -- 95 % -- --   11/18/19 1945 -- -- -- -- -- -- 93 % -- --   11/18/19 1930 -- -- -- 84 -- -- 96 % -- --   11/18/19 1900 (!) 141/84 -- -- 80 -- -- 97 % -- --   11/18/19 1800 (!) 139/93 -- -- 70 -- 20 94 % -- --   11/18/19 1730 132/80 -- -- 65 -- -- 93 % -- --   11/18/19 1700 (!) 134/91 -- -- 72 -- -- 96 % -- --   11/18/19 1630 136/79 -- -- 77 -- -- 95 % -- --   11/18/19 1615 -- -- -- -- -- -- 93 % -- --   11/18/19 1603 127/84 -- -- -- -- 18 93 % -- --   11/18/19 1600 127/84 -- -- 94 -- -- -- -- --   11/18/19 1435 133/81 96.8  F (36  C) Oral -- 87 16 94 % 1.778 m (5' 10\") 102.1 kg (225 lb)           ED RESULTS:   Results for orders placed or performed during the hospital encounter of 11/18/19 (from the past 24 hour(s))   CBC with platelets, differential     Status: Abnormal    Collection Time: 11/18/19  4:04 PM   Result Value Ref Range    WBC 9.5 4.0 - 11.0 10e9/L    RBC Count 5.65 4.4 - 5.9 10e12/L    Hemoglobin 18.2 (H) 13.3 - 17.7 g/dL    Hematocrit 55.0 (H) 40.0 - 53.0 %    MCV 97 78 - 100 fl    MCH 32.2 26.5 - 33.0 pg    MCHC 33.1 31.5 - 36.5 g/dL    RDW 14.5 10.0 - 15.0 %    Platelet Count 151 150 - 450 10e9/L    Diff Method Automated Method     % Neutrophils 79.4 %    % Lymphocytes 10.3 %    % Monocytes 7.8 %    % Eosinophils 1.2 %    % Basophils 0.8 %    % Immature Granulocytes 0.5 %    Nucleated RBCs 0 0 /100    Absolute Neutrophil 7.5 1.6 - 8.3 10e9/L    Absolute Lymphocytes 1.0 0.8 - 5.3 10e9/L    Absolute Monocytes 0.7 0.0 - 1.3 10e9/L    Absolute Eosinophils 0.1 0.0 - 0.7 10e9/L    Absolute Basophils 0.1 0.0 - 0.2 10e9/L    Abs Immature Granulocytes 0.1 0 - 0.4 10e9/L    Absolute Nucleated RBC 0.0    Comprehensive metabolic panel     Status: Abnormal    Collection Time: 11/18/19  4:04 PM   Result Value Ref Range    Sodium 141 133 - 144 mmol/L "    Potassium 4.8 3.4 - 5.3 mmol/L    Chloride 110 (H) 94 - 109 mmol/L    Carbon Dioxide 26 20 - 32 mmol/L    Anion Gap 5 3 - 14 mmol/L    Glucose 182 (H) 70 - 99 mg/dL    Urea Nitrogen 23 7 - 30 mg/dL    Creatinine 1.26 (H) 0.66 - 1.25 mg/dL    GFR Estimate 56 (L) >60 mL/min/[1.73_m2]    GFR Estimate If Black 65 >60 mL/min/[1.73_m2]    Calcium 8.7 8.5 - 10.1 mg/dL    Bilirubin Total 0.9 0.2 - 1.3 mg/dL    Albumin 3.4 3.4 - 5.0 g/dL    Protein Total 7.5 6.8 - 8.8 g/dL    Alkaline Phosphatase 108 40 - 150 U/L    ALT 30 0 - 70 U/L    AST 24 0 - 45 U/L   Troponin I     Status: None    Collection Time: 11/18/19  4:04 PM   Result Value Ref Range    Troponin I ES <0.015 0.000 - 0.045 ug/L   Nt probnp inpatient (BNP)     Status: Abnormal    Collection Time: 11/18/19  4:04 PM   Result Value Ref Range    N-Terminal Pro BNP Inpatient 1,844 (H) 0 - 900 pg/mL   D dimer quantitative     Status: None    Collection Time: 11/18/19  4:04 PM   Result Value Ref Range    D Dimer 0.3 0.0 - 0.50 ug/ml FEU   INR     Status: Abnormal    Collection Time: 11/18/19  4:37 PM   Result Value Ref Range    INR 2.60 (H) 0.86 - 1.14   Blood gas venous     Status: None    Collection Time: 11/18/19  4:46 PM   Result Value Ref Range    Ph Venous 7.38 7.32 - 7.43 pH    PCO2 Venous 42 40 - 50 mm Hg    PO2 Venous 46 25 - 47 mm Hg    Bicarbonate Venous 25 21 - 28 mmol/L    Base Deficit Venous 0.6 mmol/L    FIO2 ra    CT Head w/o Contrast     Status: None    Collection Time: 11/18/19  6:36 PM    Narrative    CT SCAN OF THE HEAD WITHOUT CONTRAST   11/18/2019 6:36 PM     HISTORY: Headache, dizziness.    TECHNIQUE:  Axial images of the head and coronal reformations without  IV contrast material. Radiation dose for this scan was reduced using  automated exposure control, adjustment of the mA and/or kV according  to patient size, or iterative reconstruction technique.    COMPARISON: Head CT 8/11/2017.    FINDINGS: There is no evidence of intracranial hemorrhage,  mass, acute  infarct or anomaly. The ventricles are normal in size, shape and  configuration. Mild diffuse parenchymal volume loss. Mild patchy  periventricular white matter hypodensities which are nonspecific, but  likely related to chronic microvascular ischemic disease.     Near-complete opacification of the left maxillary sinus. The bony  calvarium and bones of the skull base appear intact.       Impression    IMPRESSION:     1. No evidence of acute intracranial hemorrhage, mass, or herniation.  2. Mild diffuse parenchymal volume loss and white matter changes  likely due to chronic microvascular ischemic disease.   3. Near-complete opacification of the left maxillary sinus.      CARRIE WASHINGTON MD   Troponin I     Status: None    Collection Time: 11/18/19  8:15 PM   Result Value Ref Range    Troponin I ES 0.028 0.000 - 0.045 ug/L   Troponin I     Status: Abnormal    Collection Time: 11/18/19 10:35 PM   Result Value Ref Range    Troponin I ES 0.052 (H) 0.000 - 0.045 ug/L       ED MEDICATIONS:   Medications   ipratropium - albuterol 0.5 mg/2.5 mg/3 mL (DUONEB) neb solution 3 mL (3 mLs Nebulization Given 11/18/19 1648)   furosemide (LASIX) injection 20 mg (20 mg Intravenous Given 11/18/19 1812)         Impression:    ICD-10-CM    1. Dyspnea, unspecified type R06.00 Troponin I   2. Congestive heart failure, unspecified HF chronicity, unspecified heart failure type (H) I50.9 Echocardiogram Complete    possible early   3. Nonintractable headache, unspecified chronicity pattern, unspecified headache type R51    4. Polycythemia D75.1     possible   5. Maxillary sinusitis, unspecified chronicity J32.0    6. Elevated troponin R79.89        Plan:    Discharge to home per patient request with close outpatient follow-up       MD Isaac Orozco Ebenezer Tope, MD  11/18/19 4188

## 2019-11-19 NOTE — TELEPHONE ENCOUNTER
ANTICOAGULATION MANAGEMENT    Stef Bhatt due for review and annual renewal of referral to anticoagulation monitoring.      Warfarin indication(s) Atrial Fibrillation   INR goal 2.0-3.0   Current duration of therapy Indefinite/long term therapy   Date of last office visit 10/2/19       Please advise if Stef Bhatt's anticoagulation management referral can be renewed for next year.     Please confirm renewal approval in new note within this telephone encounter and route back. No further action is necessary at this time (referral orders,etc).     Bindu Callejas RN

## 2019-11-19 NOTE — TELEPHONE ENCOUNTER
"Pt called in to report an update since last seen. Has been seeing [facility] for SOB. Had PFTs there which showed slightly improved function. But pt reports he was still SOB. He had a chest CT after which he went to the ED as he was still SOB. They gave him some IV lasix after which he urinated quite a bit. After this he noted that his breathing was \"95% better\". They ordered an echo which is to be done on 11/21/19. He has a follow up with his PCP on 11/21/19 and a Lexiscan on 12/4/19. He just wanted Dr Machado to be aware of all this information. Azul Viveros RN Cardiology November 19, 2019, 11:31 AM    "

## 2019-11-19 NOTE — DISCHARGE INSTRUCTIONS
Return if symptoms worsen or new symptoms develop.  Follow-up with your primary care physician after echo.  Discussed also with your cardiologist.  If any chest pain worsening shortness of breath or other symptoms please return for further evaluation and care.  Take Lasix as directed.  Try an over-the-counter decongestant for your possible mild sinusitis which could cause slight dizziness or headache.    2) We have discussed your elevated troponin levels (with serial levels rising) and possible implications. I have advised admission for further evaluation by cardiology. You have elected to go home with a folloiw-up appointment on Thursday-11/121/9 with Dr. Foster. Dr Da Silva has also scheduled a stress test for follow-up care.    3) If your symptoms worsen or you develop new concerns you should return to the department for re-evaluation and additional care.

## 2019-11-19 NOTE — ED NOTES
Pt remains a/o x 4, denies CP, watching TV with with, talked with him that MD would be in soon to go over results

## 2019-11-20 ENCOUNTER — MEDICAL CORRESPONDENCE (OUTPATIENT)
Dept: HEALTH INFORMATION MANAGEMENT | Facility: CLINIC | Age: 74
End: 2019-11-20

## 2019-11-20 NOTE — TELEPHONE ENCOUNTER
Stef Branham's anticoagulation management should be continued for another year.     Marcelino Foster MD

## 2019-11-20 NOTE — TELEPHONE ENCOUNTER
19    Patient's chart now shows the patient as .?     Bindu Callejas RN, BSN, PHN  [facility]   [phone]

## 2019-11-21 NOTE — TELEPHONE ENCOUNTER
I just found out and talked to his wife. He  at home yesterday, likely from a heart attack. .Marcelino Foster MD

## 2020-01-22 NOTE — TELEPHONE ENCOUNTER
Dr. Foster,  Patient has been checking his INR q 9 weeks when appropriate.  Is it ok to continue INR rechecks q 9 weeks when INR is therapeutic/appropriate?  Thanks.  Gloria Arboleda RN on 10/18/2018 at 1:07 PM  
Per Dr. Cristian Castro, Yes, Stef may continue the every 9 week checks if stable.   Marcelino Foster  
cough

## 2024-02-16 NOTE — TELEPHONE ENCOUNTER
Requested Prescriptions   Pending Prescriptions Disp Refills     pregabalin (LYRICA) 50 MG capsule  Last Written Prescription Date:  09/14/17  Last Fill Quantity: 120,  # refills: 5   Last office visit: 9/14/2017 with prescribing provider:  09/14/17   Future Office Visit:     120 capsule 5     Sig: Take 2 in the am, 2 in the pm.    There is no refill protocol information for this order           History of aortic dissection